# Patient Record
Sex: MALE | Race: WHITE | URBAN - METROPOLITAN AREA
[De-identification: names, ages, dates, MRNs, and addresses within clinical notes are randomized per-mention and may not be internally consistent; named-entity substitution may affect disease eponyms.]

---

## 2017-04-03 ENCOUNTER — DOCTOR'S OFFICE (OUTPATIENT)
Dept: URBAN - METROPOLITAN AREA CLINIC 125 | Facility: CLINIC | Age: 77
Setting detail: OPHTHALMOLOGY
End: 2017-04-03
Payer: COMMERCIAL

## 2017-04-03 DIAGNOSIS — H35.81: ICD-10-CM

## 2017-04-03 DIAGNOSIS — H40.10X2: ICD-10-CM

## 2017-04-03 PROCEDURE — 92012 INTRM OPH EXAM EST PATIENT: CPT | Performed by: OPHTHALMOLOGY

## 2017-04-03 PROCEDURE — 92083 EXTENDED VISUAL FIELD XM: CPT | Performed by: OPHTHALMOLOGY

## 2017-04-03 ASSESSMENT — REFRACTION_CURRENTRX
OD_OVR_VA: 20/
OS_OVR_VA: 20/
OD_OVR_VA: 20/
OS_OVR_VA: 20/
OD_OVR_VA: 20/
OS_OVR_VA: 20/

## 2017-04-03 ASSESSMENT — REFRACTION_MANIFEST
OD_VA2: 20/
OS_VA3: 20/
OD_VA3: 20/
OD_VA2: 20/
OS_VA3: 20/
OD_VA2: 20/
OD_VA1: 20/
OD_VA3: 20/
OS_VA3: 20/
OS_VA2: 20/
OS_VA2: 20/
OD_VA1: 20/
OU_VA: 20/
OS_VA1: 20/
OS_VA2: 20/
OD_VA1: 20/
OD_VA3: 20/
OS_VA1: 20/
OU_VA: 20/
OS_VA1: 20/
OU_VA: 20/

## 2017-04-03 ASSESSMENT — VISUAL ACUITY
OD_BCVA: 20/30-2
OS_BCVA: 20/40-2

## 2017-04-03 ASSESSMENT — REFRACTION_AUTOREFRACTION
OS_CYLINDER: -0.50
OD_CYLINDER: -0.25
OS_AXIS: 099
OD_AXIS: 106
OD_SPHERE: -0.75
OS_SPHERE: 0.00

## 2017-04-03 ASSESSMENT — CONFRONTATIONAL VISUAL FIELD TEST (CVF)
OS_FINDINGS: FULL
OD_FINDINGS: FULL

## 2017-04-03 ASSESSMENT — LID EXAM ASSESSMENTS
OD_BLEPHARITIS: T
OS_BLEPHARITIS: +1

## 2017-04-03 ASSESSMENT — SUPERFICIAL PUNCTATE KERATITIS (SPK)
OS_SPK: T
OD_SPK: T

## 2017-04-03 ASSESSMENT — SPHEQUIV_DERIVED
OD_SPHEQUIV: -0.875
OS_SPHEQUIV: -0.25

## 2017-05-09 ENCOUNTER — DOCTOR'S OFFICE (OUTPATIENT)
Dept: URBAN - NONMETROPOLITAN AREA CLINIC 1 | Facility: CLINIC | Age: 77
Setting detail: OPHTHALMOLOGY
End: 2017-05-09
Payer: COMMERCIAL

## 2017-05-09 DIAGNOSIS — H04.122: ICD-10-CM

## 2017-05-09 DIAGNOSIS — H35.371: ICD-10-CM

## 2017-05-09 DIAGNOSIS — H40.10X2: ICD-10-CM

## 2017-05-09 DIAGNOSIS — H35.81: ICD-10-CM

## 2017-05-09 DIAGNOSIS — H27.8: ICD-10-CM

## 2017-05-09 DIAGNOSIS — H01.004: ICD-10-CM

## 2017-05-09 DIAGNOSIS — H01.001: ICD-10-CM

## 2017-05-09 DIAGNOSIS — H01.002: ICD-10-CM

## 2017-05-09 DIAGNOSIS — H01.005: ICD-10-CM

## 2017-05-09 DIAGNOSIS — H04.121: ICD-10-CM

## 2017-05-09 DIAGNOSIS — H43.812: ICD-10-CM

## 2017-05-09 PROCEDURE — 83861 MICROFLUID ANALY TEARS: CPT | Performed by: OPHTHALMOLOGY

## 2017-05-09 PROCEDURE — 92133 CPTRZD OPH DX IMG PST SGM ON: CPT | Performed by: OPHTHALMOLOGY

## 2017-05-09 PROCEDURE — 92250 FUNDUS PHOTOGRAPHY W/I&R: CPT | Performed by: OPHTHALMOLOGY

## 2017-05-09 PROCEDURE — 92014 COMPRE OPH EXAM EST PT 1/>: CPT | Performed by: OPHTHALMOLOGY

## 2017-05-09 ASSESSMENT — SUPERFICIAL PUNCTATE KERATITIS (SPK)
OD_SPK: T
OS_SPK: T

## 2017-05-09 ASSESSMENT — REFRACTION_MANIFEST
OS_VA1: 20/
OS_VA3: 20/
OS_VA1: 20/
OD_VA1: 20/
OS_VA3: 20/
OD_VA1: 20/
OD_VA2: 20/
OU_VA: 20/
OS_VA2: 20/
OU_VA: 20/
OS_VA3: 20/
OD_VA3: 20/
OD_VA3: 20/
OD_VA1: 20/
OS_VA2: 20/
OU_VA: 20/
OS_VA2: 20/
OS_VA1: 20/
OD_VA2: 20/
OD_VA3: 20/
OD_VA2: 20/

## 2017-05-09 ASSESSMENT — REFRACTION_CURRENTRX
OS_OVR_VA: 20/
OD_OVR_VA: 20/
OS_OVR_VA: 20/
OD_OVR_VA: 20/
OS_OVR_VA: 20/
OD_OVR_VA: 20/

## 2017-05-09 ASSESSMENT — LID EXAM ASSESSMENTS
OS_BLEPHARITIS: +1
OD_BLEPHARITIS: T

## 2017-05-09 ASSESSMENT — AXIALLENGTH_DERIVED
OD_AL: 23.0257
OS_AL: 22.9333

## 2017-05-09 ASSESSMENT — REFRACTION_AUTOREFRACTION
OS_AXIS: 85
OD_SPHERE: -0.50
OS_SPHERE: -0.25
OD_AXIS: 100
OS_CYLINDER: -0.50
OD_CYLINDER: -0.50

## 2017-05-09 ASSESSMENT — KERATOMETRY
OD_K2POWER_DIOPTERS: 46.00
OD_K1POWER_DIOPTERS: 45.75
OS_K1POWER_DIOPTERS: 45.75
OS_AXISANGLE_DEGREES: 120
OS_K2POWER_DIOPTERS: 46.00
OD_AXISANGLE_DEGREES: 011

## 2017-05-09 ASSESSMENT — VISUAL ACUITY
OD_BCVA: 20/30-2
OS_BCVA: 20/40+2

## 2017-05-09 ASSESSMENT — CONFRONTATIONAL VISUAL FIELD TEST (CVF)
OD_FINDINGS: FULL
OS_FINDINGS: FULL

## 2017-05-09 ASSESSMENT — SPHEQUIV_DERIVED
OD_SPHEQUIV: -0.75
OS_SPHEQUIV: -0.5

## 2017-07-17 ENCOUNTER — DOCTOR'S OFFICE (OUTPATIENT)
Dept: URBAN - METROPOLITAN AREA CLINIC 125 | Facility: CLINIC | Age: 77
Setting detail: OPHTHALMOLOGY
End: 2017-07-17
Payer: COMMERCIAL

## 2017-07-17 DIAGNOSIS — H04.122: ICD-10-CM

## 2017-07-17 DIAGNOSIS — H40.1111: ICD-10-CM

## 2017-07-17 DIAGNOSIS — H04.121: ICD-10-CM

## 2017-07-17 DIAGNOSIS — H35.371: ICD-10-CM

## 2017-07-17 DIAGNOSIS — H43.812: ICD-10-CM

## 2017-07-17 DIAGNOSIS — H35.81: ICD-10-CM

## 2017-07-17 PROCEDURE — 92134 CPTRZ OPH DX IMG PST SGM RTA: CPT | Performed by: OPHTHALMOLOGY

## 2017-07-17 PROCEDURE — 92014 COMPRE OPH EXAM EST PT 1/>: CPT | Performed by: OPHTHALMOLOGY

## 2017-07-17 ASSESSMENT — REFRACTION_MANIFEST
OD_VA3: 20/
OU_VA: 20/
OD_VA1: 20/
OS_VA3: 20/
OS_VA1: 20/
OD_VA1: 20/
OD_VA1: 20/
OS_VA2: 20/
OD_VA2: 20/
OD_VA2: 20/
OS_VA3: 20/
OS_VA2: 20/
OS_VA2: 20/
OU_VA: 20/
OD_VA3: 20/
OS_VA1: 20/
OS_VA3: 20/
OU_VA: 20/
OD_VA2: 20/
OS_VA1: 20/
OD_VA3: 20/

## 2017-07-17 ASSESSMENT — SUPERFICIAL PUNCTATE KERATITIS (SPK)
OD_SPK: T
OS_SPK: T

## 2017-07-17 ASSESSMENT — KERATOMETRY
OD_K1POWER_DIOPTERS: 45.75
OS_K2POWER_DIOPTERS: 46.00
OD_K2POWER_DIOPTERS: 46.00
OD_AXISANGLE_DEGREES: 011
OS_AXISANGLE_DEGREES: 120
OS_K1POWER_DIOPTERS: 45.75

## 2017-07-17 ASSESSMENT — CONFRONTATIONAL VISUAL FIELD TEST (CVF)
OD_FINDINGS: FULL
OS_FINDINGS: FULL

## 2017-07-17 ASSESSMENT — LID EXAM ASSESSMENTS
OS_BLEPHARITIS: +1
OD_BLEPHARITIS: T

## 2017-07-17 ASSESSMENT — REFRACTION_CURRENTRX
OD_OVR_VA: 20/
OD_OVR_VA: 20/
OS_OVR_VA: 20/
OS_OVR_VA: 20/
OD_OVR_VA: 20/
OS_OVR_VA: 20/

## 2017-07-17 ASSESSMENT — AXIALLENGTH_DERIVED
OS_AL: 22.9333
OD_AL: 23.0257

## 2017-07-17 ASSESSMENT — REFRACTION_AUTOREFRACTION
OD_CYLINDER: -0.50
OS_CYLINDER: -0.50
OS_AXIS: 85
OD_AXIS: 100
OD_SPHERE: -0.50
OS_SPHERE: -0.25

## 2017-07-17 ASSESSMENT — SPHEQUIV_DERIVED
OD_SPHEQUIV: -0.75
OS_SPHEQUIV: -0.5

## 2017-07-17 ASSESSMENT — VISUAL ACUITY
OS_BCVA: 20/25-2
OD_BCVA: 20/25+2

## 2017-11-15 ENCOUNTER — DOCTOR'S OFFICE (OUTPATIENT)
Dept: URBAN - NONMETROPOLITAN AREA CLINIC 1 | Facility: CLINIC | Age: 77
Setting detail: OPHTHALMOLOGY
End: 2017-11-15
Payer: COMMERCIAL

## 2017-11-15 DIAGNOSIS — H43.812: ICD-10-CM

## 2017-11-15 DIAGNOSIS — H35.371: ICD-10-CM

## 2017-11-15 DIAGNOSIS — H04.123: ICD-10-CM

## 2017-11-15 DIAGNOSIS — H35.81: ICD-10-CM

## 2017-11-15 DIAGNOSIS — H40.1111: ICD-10-CM

## 2017-11-15 PROCEDURE — 92134 CPTRZ OPH DX IMG PST SGM RTA: CPT | Performed by: OPHTHALMOLOGY

## 2017-11-15 PROCEDURE — 76514 ECHO EXAM OF EYE THICKNESS: CPT | Performed by: OPHTHALMOLOGY

## 2017-11-15 PROCEDURE — 92083 EXTENDED VISUAL FIELD XM: CPT | Performed by: OPHTHALMOLOGY

## 2017-11-15 PROCEDURE — 92014 COMPRE OPH EXAM EST PT 1/>: CPT | Performed by: OPHTHALMOLOGY

## 2017-11-15 ASSESSMENT — REFRACTION_MANIFEST
OD_VA1: 20/
OD_VA3: 20/
OS_VA1: 20/
OD_VA2: 20/
OU_VA: 20/
OD_VA2: 20/
OS_VA2: 20/
OD_VA1: 20/
OS_VA3: 20/
OD_VA3: 20/
OU_VA: 20/
OS_VA1: 20/
OU_VA: 20/
OS_VA2: 20/
OD_VA2: 20/
OD_VA1: 20/
OS_VA3: 20/
OD_VA3: 20/
OS_VA3: 20/
OS_VA1: 20/
OS_VA2: 20/

## 2017-11-15 ASSESSMENT — KERATOMETRY
OS_K1POWER_DIOPTERS: 45.75
OD_AXISANGLE_DEGREES: 011
OD_K1POWER_DIOPTERS: 45.75
OS_AXISANGLE_DEGREES: 120
OS_K2POWER_DIOPTERS: 46.00
OD_K2POWER_DIOPTERS: 46.00

## 2017-11-15 ASSESSMENT — AXIALLENGTH_DERIVED
OS_AL: 22.8873
OD_AL: 22.8873

## 2017-11-15 ASSESSMENT — REFRACTION_AUTOREFRACTION
OS_SPHERE: 0.00
OD_CYLINDER: -0.25
OS_AXIS: 095
OD_AXIS: 098
OS_CYLINDER: -0.75
OD_SPHERE: -0.25

## 2017-11-15 ASSESSMENT — PACHYMETRY
OD_CT_UM: 553
OD_CT_CORRECTION: -1
OS_CT_UM: 546
OS_CT_CORRECTION: 0

## 2017-11-15 ASSESSMENT — VISUAL ACUITY
OS_BCVA: 20/25-2
OD_BCVA: 20/20

## 2017-11-15 ASSESSMENT — SPHEQUIV_DERIVED
OD_SPHEQUIV: -0.375
OS_SPHEQUIV: -0.375

## 2017-11-15 ASSESSMENT — REFRACTION_CURRENTRX
OS_OVR_VA: 20/
OD_OVR_VA: 20/
OS_OVR_VA: 20/
OS_OVR_VA: 20/

## 2017-11-15 ASSESSMENT — CONFRONTATIONAL VISUAL FIELD TEST (CVF)
OS_FINDINGS: FULL
OD_FINDINGS: FULL

## 2017-11-15 ASSESSMENT — SUPERFICIAL PUNCTATE KERATITIS (SPK)
OS_SPK: T
OD_SPK: T

## 2018-04-02 ENCOUNTER — DOCTOR'S OFFICE (OUTPATIENT)
Dept: URBAN - METROPOLITAN AREA CLINIC 136 | Facility: CLINIC | Age: 78
Setting detail: OPHTHALMOLOGY
End: 2018-04-02
Payer: COMMERCIAL

## 2018-04-02 DIAGNOSIS — H43.812: ICD-10-CM

## 2018-04-02 DIAGNOSIS — H40.1111: ICD-10-CM

## 2018-04-02 DIAGNOSIS — H35.371: ICD-10-CM

## 2018-04-02 DIAGNOSIS — H35.81: ICD-10-CM

## 2018-04-02 DIAGNOSIS — H04.123: ICD-10-CM

## 2018-04-02 DIAGNOSIS — H27.8: ICD-10-CM

## 2018-04-02 PROCEDURE — 92014 COMPRE OPH EXAM EST PT 1/>: CPT | Performed by: OPHTHALMOLOGY

## 2018-04-02 PROCEDURE — 92134 CPTRZ OPH DX IMG PST SGM RTA: CPT | Performed by: OPHTHALMOLOGY

## 2018-04-02 ASSESSMENT — REFRACTION_MANIFEST
OU_VA: 20/
OD_VA3: 20/
OS_VA1: 20/
OD_VA1: 20/
OU_VA: 20/
OD_VA2: 20/
OU_VA: 20/
OD_VA3: 20/
OD_VA1: 20/
OS_VA2: 20/
OS_VA3: 20/
OD_VA1: 20/
OS_VA1: 20/
OS_VA3: 20/
OD_VA2: 20/
OS_VA1: 20/
OS_VA2: 20/
OS_VA3: 20/
OD_VA3: 20/
OD_VA2: 20/
OS_VA2: 20/

## 2018-04-02 ASSESSMENT — REFRACTION_CURRENTRX
OS_OVR_VA: 20/
OD_OVR_VA: 20/
OS_OVR_VA: 20/
OD_OVR_VA: 20/
OS_OVR_VA: 20/
OD_OVR_VA: 20/

## 2018-04-02 ASSESSMENT — SPHEQUIV_DERIVED
OD_SPHEQUIV: -0.375
OS_SPHEQUIV: -0.375

## 2018-04-02 ASSESSMENT — SUPERFICIAL PUNCTATE KERATITIS (SPK)
OS_SPK: T
OD_SPK: T

## 2018-04-02 ASSESSMENT — REFRACTION_AUTOREFRACTION
OD_CYLINDER: -0.25
OS_SPHERE: 0.00
OS_CYLINDER: -0.75
OS_AXIS: 095
OD_SPHERE: -0.25
OD_AXIS: 098

## 2018-04-02 ASSESSMENT — CONFRONTATIONAL VISUAL FIELD TEST (CVF)
OS_FINDINGS: FULL
OD_FINDINGS: FULL

## 2018-04-02 ASSESSMENT — VISUAL ACUITY
OD_BCVA: 20/25+2
OS_BCVA: 20/20-2

## 2018-05-15 ENCOUNTER — DOCTOR'S OFFICE (OUTPATIENT)
Dept: URBAN - NONMETROPOLITAN AREA CLINIC 1 | Facility: CLINIC | Age: 78
Setting detail: OPHTHALMOLOGY
End: 2018-05-15
Payer: COMMERCIAL

## 2018-05-15 DIAGNOSIS — H35.81: ICD-10-CM

## 2018-05-15 DIAGNOSIS — H43.812: ICD-10-CM

## 2018-05-15 DIAGNOSIS — H40.1111: ICD-10-CM

## 2018-05-15 DIAGNOSIS — H27.8: ICD-10-CM

## 2018-05-15 DIAGNOSIS — H35.371: ICD-10-CM

## 2018-05-15 DIAGNOSIS — H04.123: ICD-10-CM

## 2018-05-15 PROCEDURE — 92014 COMPRE OPH EXAM EST PT 1/>: CPT | Performed by: OPHTHALMOLOGY

## 2018-05-15 PROCEDURE — 92133 CPTRZD OPH DX IMG PST SGM ON: CPT | Performed by: OPHTHALMOLOGY

## 2018-05-15 PROCEDURE — 92250 FUNDUS PHOTOGRAPHY W/I&R: CPT | Performed by: OPHTHALMOLOGY

## 2018-05-15 ASSESSMENT — SUPERFICIAL PUNCTATE KERATITIS (SPK)
OD_SPK: T
OS_SPK: T

## 2018-05-15 ASSESSMENT — REFRACTION_MANIFEST
OD_VA1: 20/
OD_VA1: 20/
OD_VA3: 20/
OD_VA2: 20/
OD_VA2: 20/
OS_VA1: 20/
OD_VA3: 20/
OS_VA2: 20/
OS_VA3: 20/
OS_VA3: 20/
OS_VA1: 20/
OU_VA: 20/
OS_VA1: 20/
OD_VA2: 20/
OD_VA3: 20/
OS_VA3: 20/
OS_VA2: 20/
OU_VA: 20/
OU_VA: 20/
OS_VA2: 20/
OD_VA1: 20/

## 2018-05-15 ASSESSMENT — REFRACTION_CURRENTRX
OD_OVR_VA: 20/
OS_OVR_VA: 20/

## 2018-05-15 ASSESSMENT — REFRACTION_AUTOREFRACTION
OD_AXIS: 098
OD_CYLINDER: -0.25
OS_SPHERE: 0.00
OS_AXIS: 095
OD_SPHERE: -0.25
OS_CYLINDER: -0.75

## 2018-05-15 ASSESSMENT — SPHEQUIV_DERIVED
OS_SPHEQUIV: -0.375
OD_SPHEQUIV: -0.375

## 2018-05-15 ASSESSMENT — CONFRONTATIONAL VISUAL FIELD TEST (CVF)
OS_FINDINGS: FULL
OD_FINDINGS: FULL

## 2018-05-15 ASSESSMENT — VISUAL ACUITY
OD_BCVA: 20/25
OS_BCVA: 20/25

## 2018-06-18 ENCOUNTER — DOCTOR'S OFFICE (OUTPATIENT)
Dept: URBAN - METROPOLITAN AREA CLINIC 136 | Facility: CLINIC | Age: 78
Setting detail: OPHTHALMOLOGY
End: 2018-06-18
Payer: COMMERCIAL

## 2018-06-18 DIAGNOSIS — H35.371: ICD-10-CM

## 2018-06-18 DIAGNOSIS — H40.1111: ICD-10-CM

## 2018-06-18 DIAGNOSIS — H43.812: ICD-10-CM

## 2018-06-18 DIAGNOSIS — H35.81: ICD-10-CM

## 2018-06-18 DIAGNOSIS — H04.123: ICD-10-CM

## 2018-06-18 DIAGNOSIS — H27.8: ICD-10-CM

## 2018-06-18 PROCEDURE — 92012 INTRM OPH EXAM EST PATIENT: CPT | Performed by: OPHTHALMOLOGY

## 2018-06-18 PROCEDURE — 92134 CPTRZ OPH DX IMG PST SGM RTA: CPT | Performed by: OPHTHALMOLOGY

## 2018-06-18 ASSESSMENT — REFRACTION_MANIFEST
OD_VA2: 20/
OS_VA2: 20/
OD_VA3: 20/
OD_VA1: 20/
OU_VA: 20/
OU_VA: 20/
OS_VA1: 20/
OS_VA1: 20/
OS_VA3: 20/
OS_VA1: 20/
OS_VA3: 20/
OD_VA3: 20/
OD_VA2: 20/
OS_VA2: 20/
OD_VA2: 20/
OS_VA3: 20/
OU_VA: 20/
OS_VA2: 20/
OD_VA3: 20/

## 2018-06-18 ASSESSMENT — REFRACTION_AUTOREFRACTION
OD_SPHERE: -0.25
OD_AXIS: 098
OD_CYLINDER: -0.25
OS_AXIS: 095
OS_SPHERE: 0.00
OS_CYLINDER: -0.75

## 2018-06-18 ASSESSMENT — SPHEQUIV_DERIVED
OD_SPHEQUIV: -0.375
OS_SPHEQUIV: -0.375

## 2018-06-18 ASSESSMENT — REFRACTION_CURRENTRX
OD_OVR_VA: 20/
OS_OVR_VA: 20/
OD_OVR_VA: 20/
OS_OVR_VA: 20/
OD_OVR_VA: 20/
OS_OVR_VA: 20/

## 2018-06-18 ASSESSMENT — CONFRONTATIONAL VISUAL FIELD TEST (CVF)
OS_FINDINGS: FULL
OD_FINDINGS: FULL

## 2018-06-18 ASSESSMENT — SUPERFICIAL PUNCTATE KERATITIS (SPK)
OS_SPK: T
OD_SPK: T

## 2018-06-18 ASSESSMENT — VISUAL ACUITY
OD_BCVA: 20/20-2
OS_BCVA: 20/25-2

## 2018-09-17 ENCOUNTER — DOCTOR'S OFFICE (OUTPATIENT)
Dept: URBAN - METROPOLITAN AREA CLINIC 136 | Facility: CLINIC | Age: 78
Setting detail: OPHTHALMOLOGY
End: 2018-09-17
Payer: COMMERCIAL

## 2018-09-17 DIAGNOSIS — H27.8: ICD-10-CM

## 2018-09-17 DIAGNOSIS — H35.81: ICD-10-CM

## 2018-09-17 DIAGNOSIS — H35.371: ICD-10-CM

## 2018-09-17 DIAGNOSIS — H04.122: ICD-10-CM

## 2018-09-17 DIAGNOSIS — H43.812: ICD-10-CM

## 2018-09-17 DIAGNOSIS — H40.1111: ICD-10-CM

## 2018-09-17 DIAGNOSIS — H04.121: ICD-10-CM

## 2018-09-17 PROCEDURE — 92134 CPTRZ OPH DX IMG PST SGM RTA: CPT | Performed by: OPHTHALMOLOGY

## 2018-09-17 PROCEDURE — 92014 COMPRE OPH EXAM EST PT 1/>: CPT | Performed by: OPHTHALMOLOGY

## 2018-09-17 ASSESSMENT — SPHEQUIV_DERIVED
OS_SPHEQUIV: -0.375
OD_SPHEQUIV: -0.375

## 2018-09-17 ASSESSMENT — REFRACTION_CURRENTRX
OS_OVR_VA: 20/
OS_OVR_VA: 20/
OD_OVR_VA: 20/
OD_OVR_VA: 20/
OS_OVR_VA: 20/
OD_OVR_VA: 20/

## 2018-09-17 ASSESSMENT — REFRACTION_MANIFEST
OD_VA3: 20/
OU_VA: 20/
OS_VA3: 20/
OS_VA1: 20/
OS_VA1: 20/
OS_VA2: 20/
OS_VA3: 20/
OD_VA1: 20/
OU_VA: 20/
OD_VA2: 20/
OS_VA2: 20/
OD_VA1: 20/
OD_VA3: 20/
OD_VA2: 20/

## 2018-09-17 ASSESSMENT — REFRACTION_AUTOREFRACTION
OS_CYLINDER: -0.75
OD_AXIS: 098
OS_AXIS: 095
OD_SPHERE: -0.25
OS_SPHERE: 0.00
OD_CYLINDER: -0.25

## 2018-09-17 ASSESSMENT — VISUAL ACUITY
OD_BCVA: 20/25+2
OS_BCVA: 20/25-2

## 2018-09-17 ASSESSMENT — CONFRONTATIONAL VISUAL FIELD TEST (CVF)
OD_FINDINGS: FULL
OS_FINDINGS: FULL

## 2018-09-17 ASSESSMENT — SUPERFICIAL PUNCTATE KERATITIS (SPK)
OS_SPK: T
OD_SPK: T

## 2018-11-12 ENCOUNTER — DOCTOR'S OFFICE (OUTPATIENT)
Dept: URBAN - METROPOLITAN AREA CLINIC 125 | Facility: CLINIC | Age: 78
Setting detail: OPHTHALMOLOGY
End: 2018-11-12
Payer: COMMERCIAL

## 2018-11-12 DIAGNOSIS — H04.123: ICD-10-CM

## 2018-11-12 DIAGNOSIS — H40.1111: ICD-10-CM

## 2018-11-12 DIAGNOSIS — H04.122: ICD-10-CM

## 2018-11-12 DIAGNOSIS — H04.121: ICD-10-CM

## 2018-11-12 DIAGNOSIS — H35.81: ICD-10-CM

## 2018-11-12 PROCEDURE — 83861 MICROFLUID ANALY TEARS: CPT | Performed by: OPHTHALMOLOGY

## 2018-11-12 PROCEDURE — 92083 EXTENDED VISUAL FIELD XM: CPT | Performed by: OPHTHALMOLOGY

## 2018-11-12 PROCEDURE — 92014 COMPRE OPH EXAM EST PT 1/>: CPT | Performed by: OPHTHALMOLOGY

## 2018-11-12 PROCEDURE — 92250 FUNDUS PHOTOGRAPHY W/I&R: CPT | Performed by: OPHTHALMOLOGY

## 2018-11-12 PROCEDURE — 76514 ECHO EXAM OF EYE THICKNESS: CPT | Performed by: OPHTHALMOLOGY

## 2018-11-12 ASSESSMENT — SPHEQUIV_DERIVED
OS_SPHEQUIV: -0.375
OD_SPHEQUIV: -0.375

## 2018-11-12 ASSESSMENT — REFRACTION_MANIFEST
OD_VA3: 20/
OD_VA2: 20/
OS_VA1: 20/
OS_VA2: 20/
OD_VA2: 20/
OU_VA: 20/
OU_VA: 20/
OD_VA1: 20/
OS_VA3: 20/
OD_VA1: 20/
OS_VA3: 20/
OD_VA3: 20/
OS_VA2: 20/
OS_VA1: 20/

## 2018-11-12 ASSESSMENT — CONFRONTATIONAL VISUAL FIELD TEST (CVF)
OD_FINDINGS: FULL
OS_FINDINGS: FULL

## 2018-11-12 ASSESSMENT — VISUAL ACUITY
OS_BCVA: 20/25-2
OD_BCVA: 20/25-2

## 2018-11-12 ASSESSMENT — REFRACTION_AUTOREFRACTION
OS_AXIS: 095
OS_CYLINDER: -0.75
OD_SPHERE: -0.25
OD_CYLINDER: -0.25
OD_AXIS: 098
OS_SPHERE: 0.00

## 2018-11-12 ASSESSMENT — SUPERFICIAL PUNCTATE KERATITIS (SPK)
OS_SPK: T
OD_SPK: T

## 2018-11-12 ASSESSMENT — REFRACTION_CURRENTRX
OS_OVR_VA: 20/
OD_OVR_VA: 20/
OS_OVR_VA: 20/
OS_OVR_VA: 20/
OD_OVR_VA: 20/
OD_OVR_VA: 20/

## 2018-11-12 ASSESSMENT — PACHYMETRY
OS_CT_UM: 546
OD_CT_UM: 553
OD_CT_CORRECTION: -1
OS_CT_CORRECTION: 0

## 2018-12-06 ENCOUNTER — DOCTOR'S OFFICE (OUTPATIENT)
Dept: URBAN - METROPOLITAN AREA CLINIC 136 | Facility: CLINIC | Age: 78
Setting detail: OPHTHALMOLOGY
End: 2018-12-06
Payer: COMMERCIAL

## 2018-12-06 DIAGNOSIS — H35.371: ICD-10-CM

## 2018-12-06 DIAGNOSIS — H27.8: ICD-10-CM

## 2018-12-06 DIAGNOSIS — H40.1111: ICD-10-CM

## 2018-12-06 DIAGNOSIS — H43.812: ICD-10-CM

## 2018-12-06 DIAGNOSIS — H35.81: ICD-10-CM

## 2018-12-06 PROCEDURE — 92134 CPTRZ OPH DX IMG PST SGM RTA: CPT | Performed by: OPHTHALMOLOGY

## 2018-12-06 PROCEDURE — 92014 COMPRE OPH EXAM EST PT 1/>: CPT | Performed by: OPHTHALMOLOGY

## 2018-12-06 ASSESSMENT — SUPERFICIAL PUNCTATE KERATITIS (SPK)
OD_SPK: T
OS_SPK: T

## 2018-12-06 ASSESSMENT — CONFRONTATIONAL VISUAL FIELD TEST (CVF)
OD_FINDINGS: FULL
OS_FINDINGS: FULL

## 2018-12-06 ASSESSMENT — SPHEQUIV_DERIVED
OS_SPHEQUIV: -0.375
OD_SPHEQUIV: -0.375

## 2018-12-06 ASSESSMENT — REFRACTION_MANIFEST
OD_VA2: 20/
OD_VA2: 20/
OS_VA1: 20/
OU_VA: 20/
OS_VA2: 20/
OD_VA1: 20/
OU_VA: 20/
OD_VA1: 20/
OS_VA3: 20/
OS_VA2: 20/
OD_VA3: 20/
OS_VA3: 20/
OS_VA1: 20/
OD_VA3: 20/

## 2018-12-06 ASSESSMENT — REFRACTION_AUTOREFRACTION
OD_AXIS: 098
OS_CYLINDER: -0.75
OS_AXIS: 095
OD_CYLINDER: -0.25
OD_SPHERE: -0.25
OS_SPHERE: 0.00

## 2018-12-06 ASSESSMENT — VISUAL ACUITY
OS_BCVA: 20/25+2
OD_BCVA: 20/20-2

## 2018-12-06 ASSESSMENT — REFRACTION_CURRENTRX
OD_OVR_VA: 20/
OS_OVR_VA: 20/

## 2019-03-04 ENCOUNTER — DOCTOR'S OFFICE (OUTPATIENT)
Dept: URBAN - METROPOLITAN AREA CLINIC 136 | Facility: CLINIC | Age: 79
Setting detail: OPHTHALMOLOGY
End: 2019-03-04
Payer: COMMERCIAL

## 2019-03-04 DIAGNOSIS — H40.1111: ICD-10-CM

## 2019-03-04 DIAGNOSIS — H35.371: ICD-10-CM

## 2019-03-04 DIAGNOSIS — H43.812: ICD-10-CM

## 2019-03-04 DIAGNOSIS — H35.81: ICD-10-CM

## 2019-03-04 DIAGNOSIS — H27.8: ICD-10-CM

## 2019-03-04 PROCEDURE — 92014 COMPRE OPH EXAM EST PT 1/>: CPT | Performed by: OPHTHALMOLOGY

## 2019-03-04 PROCEDURE — 92134 CPTRZ OPH DX IMG PST SGM RTA: CPT | Performed by: OPHTHALMOLOGY

## 2019-03-04 ASSESSMENT — REFRACTION_CURRENTRX
OS_OVR_VA: 20/
OD_OVR_VA: 20/
OS_OVR_VA: 20/
OS_OVR_VA: 20/
OD_OVR_VA: 20/
OD_OVR_VA: 20/

## 2019-03-04 ASSESSMENT — SUPERFICIAL PUNCTATE KERATITIS (SPK)
OD_SPK: T
OS_SPK: T

## 2019-03-04 ASSESSMENT — REFRACTION_MANIFEST
OD_VA3: 20/
OU_VA: 20/
OD_VA3: 20/
OS_VA1: 20/
OU_VA: 20/
OS_VA1: 20/
OS_VA3: 20/
OS_VA2: 20/
OD_VA2: 20/
OS_VA3: 20/
OD_VA1: 20/
OD_VA2: 20/
OS_VA2: 20/
OD_VA1: 20/

## 2019-03-04 ASSESSMENT — SPHEQUIV_DERIVED
OD_SPHEQUIV: -0.375
OS_SPHEQUIV: -0.375

## 2019-03-04 ASSESSMENT — REFRACTION_AUTOREFRACTION
OS_SPHERE: 0.00
OD_CYLINDER: -0.25
OS_CYLINDER: -0.75
OD_SPHERE: -0.25
OS_AXIS: 095
OD_AXIS: 098

## 2019-03-04 ASSESSMENT — CONFRONTATIONAL VISUAL FIELD TEST (CVF)
OS_FINDINGS: FULL
OD_FINDINGS: FULL

## 2019-03-04 ASSESSMENT — VISUAL ACUITY
OS_BCVA: 20/30+2
OD_BCVA: 20/25-2

## 2019-06-07 ENCOUNTER — DOCTOR'S OFFICE (OUTPATIENT)
Dept: URBAN - METROPOLITAN AREA CLINIC 136 | Facility: CLINIC | Age: 79
Setting detail: OPHTHALMOLOGY
End: 2019-06-07

## 2019-06-07 DIAGNOSIS — H52.13: ICD-10-CM

## 2019-06-07 DIAGNOSIS — H52.223: ICD-10-CM

## 2019-06-07 DIAGNOSIS — H52.4: ICD-10-CM

## 2019-06-07 PROCEDURE — 92015 DETERMINE REFRACTIVE STATE: CPT | Performed by: OPTOMETRIST

## 2019-06-07 ASSESSMENT — REFRACTION_MANIFEST
OS_VA3: 20/
OD_VA2: 20/
OD_VA3: 20/
OS_VA1: 20/20
OS_AXIS: 080
OS_VA2: 20/20
OS_ADD: +2.25
OU_VA: 20/20
OD_VA1: 20/
OD_VA3: 20/
OD_SPHERE: -0.50
OS_VA2: 20/
OD_VA1: 20/20-2
OU_VA: 20/
OD_CYLINDER: SPH
OS_VA3: 20/
OS_VA1: 20/
OS_CYLINDER: -0.75
OS_SPHERE: PLANO
OD_ADD: +2.25
OD_VA2: 20/20

## 2019-06-07 ASSESSMENT — REFRACTION_AUTOREFRACTION
OD_CYLINDER: -0.25
OD_AXIS: 115
OS_SPHERE: +0.25
OS_CYLINDER: -0.75
OS_AXIS: 101
OD_SPHERE: PLANO

## 2019-06-07 ASSESSMENT — REFRACTION_CURRENTRX
OS_OVR_VA: 20/
OS_OVR_VA: 20/
OD_OVR_VA: 20/
OS_OVR_VA: 20/
OD_OVR_VA: 20/
OD_OVR_VA: 20/

## 2019-06-07 ASSESSMENT — KERATOMETRY
OS_AXISANGLE_DEGREES: 120
OS_K2POWER_DIOPTERS: 46.00
OD_K2POWER_DIOPTERS: 46.00
OS_K1POWER_DIOPTERS: 45.75
OD_AXISANGLE_DEGREES: 011
OD_K1POWER_DIOPTERS: 45.75

## 2019-06-07 ASSESSMENT — SPHEQUIV_DERIVED: OS_SPHEQUIV: -0.125

## 2019-06-07 ASSESSMENT — VISUAL ACUITY
OD_BCVA: 20/25+2
OS_BCVA: 20/30+2

## 2019-06-07 ASSESSMENT — AXIALLENGTH_DERIVED: OS_AL: 22.796

## 2019-06-17 ENCOUNTER — DOCTOR'S OFFICE (OUTPATIENT)
Dept: URBAN - METROPOLITAN AREA CLINIC 136 | Facility: CLINIC | Age: 79
Setting detail: OPHTHALMOLOGY
End: 2019-06-17
Payer: COMMERCIAL

## 2019-06-17 DIAGNOSIS — H27.8: ICD-10-CM

## 2019-06-17 DIAGNOSIS — H40.1111: ICD-10-CM

## 2019-06-17 DIAGNOSIS — H47.011: ICD-10-CM

## 2019-06-17 PROCEDURE — 92133 CPTRZD OPH DX IMG PST SGM ON: CPT | Performed by: OPHTHALMOLOGY

## 2019-06-17 PROCEDURE — 92014 COMPRE OPH EXAM EST PT 1/>: CPT | Performed by: OPHTHALMOLOGY

## 2019-06-17 ASSESSMENT — CONFRONTATIONAL VISUAL FIELD TEST (CVF)
OS_FINDINGS: FULL
OD_FINDINGS: FULL

## 2019-06-17 ASSESSMENT — REFRACTION_MANIFEST
OU_VA: 20/
OD_ADD: +2.25
OD_VA1: 20/20-2
OS_VA1: 20/20
OS_ADD: +2.25
OS_VA2: 20/20
OD_CYLINDER: SPH
OD_VA2: 20/20
OD_VA3: 20/
OD_VA3: 20/
OD_VA1: 20/
OU_VA: 20/20
OD_SPHERE: -0.50
OS_CYLINDER: -0.75
OS_VA1: 20/
OS_VA3: 20/
OS_SPHERE: PLANO
OD_VA2: 20/
OS_AXIS: 080
OS_VA2: 20/
OS_VA3: 20/

## 2019-06-17 ASSESSMENT — REFRACTION_CURRENTRX
OS_OVR_VA: 20/
OD_OVR_VA: 20/
OS_OVR_VA: 20/
OD_OVR_VA: 20/
OD_OVR_VA: 20/
OS_OVR_VA: 20/

## 2019-06-17 ASSESSMENT — REFRACTION_AUTOREFRACTION
OS_CYLINDER: -0.75
OD_CYLINDER: -0.25
OS_AXIS: 101
OD_SPHERE: PLANO
OS_SPHERE: +0.25
OD_AXIS: 115

## 2019-06-17 ASSESSMENT — VISUAL ACUITY
OD_BCVA: 20/25+2
OS_BCVA: 20/25-2

## 2019-06-17 ASSESSMENT — SUPERFICIAL PUNCTATE KERATITIS (SPK)
OS_SPK: T
OD_SPK: T

## 2019-06-17 ASSESSMENT — SPHEQUIV_DERIVED: OS_SPHEQUIV: -0.125

## 2019-07-01 ENCOUNTER — RX ONLY (RX ONLY)
Age: 79
End: 2019-07-01

## 2019-07-01 ENCOUNTER — OPTICAL OFFICE (OUTPATIENT)
Dept: URBAN - METROPOLITAN AREA CLINIC 143 | Facility: CLINIC | Age: 79
Setting detail: OPHTHALMOLOGY
End: 2019-07-01

## 2019-07-01 ENCOUNTER — DOCTOR'S OFFICE (OUTPATIENT)
Dept: URBAN - METROPOLITAN AREA CLINIC 136 | Facility: CLINIC | Age: 79
Setting detail: OPHTHALMOLOGY
End: 2019-07-01
Payer: COMMERCIAL

## 2019-07-01 DIAGNOSIS — H52.11: ICD-10-CM

## 2019-07-01 DIAGNOSIS — H47.011: ICD-10-CM

## 2019-07-01 DIAGNOSIS — H40.1111: ICD-10-CM

## 2019-07-01 DIAGNOSIS — H27.8: ICD-10-CM

## 2019-07-01 DIAGNOSIS — H52.222: ICD-10-CM

## 2019-07-01 PROCEDURE — V2750 ANTI-REFLECTIVE COATING: HCPCS | Performed by: OPTOMETRIST

## 2019-07-01 PROCEDURE — 99212 OFFICE O/P EST SF 10 MIN: CPT | Performed by: OPHTHALMOLOGY

## 2019-07-01 PROCEDURE — V2020 VISION SVCS FRAMES PURCHASES: HCPCS | Performed by: OPTOMETRIST

## 2019-07-01 PROCEDURE — V2781 PROGRESSIVE LENS PER LENS: HCPCS | Performed by: OPTOMETRIST

## 2019-07-01 PROCEDURE — V2203 LENS SPHCYL BIFOCAL 4.00D/.1: HCPCS | Performed by: OPTOMETRIST

## 2019-07-01 PROCEDURE — V2784 LENS POLYCARB OR EQUAL: HCPCS | Performed by: OPTOMETRIST

## 2019-07-01 PROCEDURE — V2200 LENS SPHER BIFOC PLANO 4.00D: HCPCS | Performed by: OPTOMETRIST

## 2019-07-01 ASSESSMENT — SPHEQUIV_DERIVED: OS_SPHEQUIV: -0.125

## 2019-07-01 ASSESSMENT — REFRACTION_MANIFEST
OS_VA1: 20/
OS_VA3: 20/
OD_VA1: 20/
OD_VA2: 20/
OS_ADD: +2.25
OU_VA: 20/20
OS_VA2: 20/20
OD_CYLINDER: SPH
OS_AXIS: 080
OD_VA2: 20/20
OS_CYLINDER: -0.75
OS_VA2: 20/
OD_SPHERE: -0.50
OS_VA3: 20/
OD_ADD: +2.25
OD_VA3: 20/
OD_VA1: 20/20-2
OS_VA1: 20/20
OU_VA: 20/
OD_VA3: 20/
OS_SPHERE: PLANO

## 2019-07-01 ASSESSMENT — REFRACTION_CURRENTRX
OD_OVR_VA: 20/
OS_OVR_VA: 20/
OD_OVR_VA: 20/
OD_OVR_VA: 20/
OS_OVR_VA: 20/
OS_OVR_VA: 20/

## 2019-07-01 ASSESSMENT — REFRACTION_AUTOREFRACTION
OD_SPHERE: PLANO
OS_SPHERE: +0.25
OS_CYLINDER: -0.75
OD_AXIS: 115
OD_CYLINDER: -0.25
OS_AXIS: 101

## 2019-07-01 ASSESSMENT — CONFRONTATIONAL VISUAL FIELD TEST (CVF)
OD_FINDINGS: FULL
OS_FINDINGS: FULL

## 2019-07-01 ASSESSMENT — SUPERFICIAL PUNCTATE KERATITIS (SPK)
OS_SPK: T
OD_SPK: T

## 2019-07-01 ASSESSMENT — VISUAL ACUITY
OS_BCVA: 20/20-1
OD_BCVA: 20/25+2

## 2019-07-09 ENCOUNTER — DOCTOR'S OFFICE (OUTPATIENT)
Dept: URBAN - METROPOLITAN AREA CLINIC 136 | Facility: CLINIC | Age: 79
Setting detail: OPHTHALMOLOGY
End: 2019-07-09
Payer: COMMERCIAL

## 2019-07-09 DIAGNOSIS — H35.371: ICD-10-CM

## 2019-07-09 DIAGNOSIS — H35.81: ICD-10-CM

## 2019-07-09 DIAGNOSIS — H27.8: ICD-10-CM

## 2019-07-09 DIAGNOSIS — H40.1111: ICD-10-CM

## 2019-07-09 PROCEDURE — 92014 COMPRE OPH EXAM EST PT 1/>: CPT | Performed by: OPHTHALMOLOGY

## 2019-07-09 PROCEDURE — 92134 CPTRZ OPH DX IMG PST SGM RTA: CPT | Performed by: OPHTHALMOLOGY

## 2019-07-09 ASSESSMENT — REFRACTION_MANIFEST
OD_ADD: +2.25
OD_CYLINDER: SPH
OS_VA1: 20/
OD_VA1: 20/
OS_VA1: 20/20
OU_VA: 20/20
OD_VA1: 20/20-2
OD_VA3: 20/
OU_VA: 20/
OD_SPHERE: -0.50
OS_SPHERE: PLANO
OS_AXIS: 080
OD_VA2: 20/20
OD_VA2: 20/
OS_VA3: 20/
OS_VA3: 20/
OD_VA3: 20/
OS_ADD: +2.25
OS_VA2: 20/20
OS_VA2: 20/
OS_CYLINDER: -0.75

## 2019-07-09 ASSESSMENT — REFRACTION_CURRENTRX
OS_OVR_VA: 20/
OD_CYLINDER: SPHERE
OS_CYLINDER: -0.75
OS_ADD: +2.25
OD_OVR_VA: 20/
OS_VPRISM_DIRECTION: PROGS
OD_VPRISM_DIRECTION: PROGS
OS_SPHERE: PLANO
OD_OVR_VA: 20/
OD_ADD: +2.25
OS_OVR_VA: 20/
OD_OVR_VA: 20/
OS_OVR_VA: 20/
OS_AXIS: 080
OD_SPHERE: -0.50

## 2019-07-09 ASSESSMENT — CONFRONTATIONAL VISUAL FIELD TEST (CVF)
OS_FINDINGS: FULL
OD_FINDINGS: FULL

## 2019-07-09 ASSESSMENT — REFRACTION_AUTOREFRACTION
OD_CYLINDER: -0.25
OS_AXIS: 101
OD_AXIS: 115
OD_SPHERE: PLANO
OS_SPHERE: +0.25
OS_CYLINDER: -0.75

## 2019-07-09 ASSESSMENT — SUPERFICIAL PUNCTATE KERATITIS (SPK)
OS_SPK: T
OD_SPK: T

## 2019-07-09 ASSESSMENT — VISUAL ACUITY
OS_BCVA: 20/20
OD_BCVA: 20/20-1

## 2019-07-09 ASSESSMENT — SPHEQUIV_DERIVED: OS_SPHEQUIV: -0.125

## 2019-09-13 ENCOUNTER — OPTICAL OFFICE (OUTPATIENT)
Dept: URBAN - NONMETROPOLITAN AREA CLINIC 4 | Facility: CLINIC | Age: 79
Setting detail: OPHTHALMOLOGY
End: 2019-09-13

## 2019-09-13 DIAGNOSIS — H52.7: ICD-10-CM

## 2019-09-13 PROCEDURE — V2020 VISION SVCS FRAMES PURCHASES: HCPCS | Performed by: OPHTHALMOLOGY

## 2019-10-08 ENCOUNTER — DOCTOR'S OFFICE (OUTPATIENT)
Dept: URBAN - METROPOLITAN AREA CLINIC 136 | Facility: CLINIC | Age: 79
Setting detail: OPHTHALMOLOGY
End: 2019-10-08
Payer: COMMERCIAL

## 2019-10-08 DIAGNOSIS — H43.812: ICD-10-CM

## 2019-10-08 DIAGNOSIS — H35.81: ICD-10-CM

## 2019-10-08 DIAGNOSIS — H40.1111: ICD-10-CM

## 2019-10-08 DIAGNOSIS — H35.371: ICD-10-CM

## 2019-10-08 PROCEDURE — 92012 INTRM OPH EXAM EST PATIENT: CPT | Performed by: OPHTHALMOLOGY

## 2019-10-08 PROCEDURE — 92134 CPTRZ OPH DX IMG PST SGM RTA: CPT | Performed by: OPHTHALMOLOGY

## 2019-10-08 ASSESSMENT — REFRACTION_MANIFEST
OD_VA2: 20/20
OD_VA1: 20/20-2
OD_CYLINDER: SPH
OD_ADD: +2.25
OD_VA1: 20/
OS_AXIS: 080
OS_VA3: 20/
OU_VA: 20/
OU_VA: 20/20
OS_SPHERE: PLANO
OS_CYLINDER: -0.75
OD_SPHERE: -0.50
OS_VA1: 20/
OD_VA2: 20/
OD_VA3: 20/
OS_ADD: +2.25
OS_VA1: 20/20
OS_VA2: 20/20
OS_VA2: 20/
OS_VA3: 20/
OD_VA3: 20/

## 2019-10-08 ASSESSMENT — REFRACTION_CURRENTRX
OD_OVR_VA: 20/
OS_SPHERE: PLANO
OS_OVR_VA: 20/
OD_SPHERE: -0.50
OD_VPRISM_DIRECTION: PROGS
OS_VPRISM_DIRECTION: PROGS
OS_AXIS: 080
OS_OVR_VA: 20/
OS_ADD: +2.25
OD_CYLINDER: SPHERE
OS_OVR_VA: 20/
OD_OVR_VA: 20/
OD_OVR_VA: 20/
OS_CYLINDER: -0.75
OD_ADD: +2.25

## 2019-10-08 ASSESSMENT — REFRACTION_AUTOREFRACTION
OD_CYLINDER: -0.25
OS_CYLINDER: -0.75
OD_AXIS: 115
OD_SPHERE: PLANO
OS_SPHERE: +0.25
OS_AXIS: 101

## 2019-10-08 ASSESSMENT — CONFRONTATIONAL VISUAL FIELD TEST (CVF)
OS_FINDINGS: FULL
OD_FINDINGS: FULL

## 2019-10-08 ASSESSMENT — VISUAL ACUITY
OD_BCVA: 20/20-1
OS_BCVA: 20/25+1

## 2019-10-08 ASSESSMENT — SUPERFICIAL PUNCTATE KERATITIS (SPK)
OS_SPK: T
OD_SPK: T

## 2019-10-08 ASSESSMENT — SPHEQUIV_DERIVED: OS_SPHEQUIV: -0.125

## 2019-11-18 ENCOUNTER — DOCTOR'S OFFICE (OUTPATIENT)
Dept: URBAN - METROPOLITAN AREA CLINIC 136 | Facility: CLINIC | Age: 79
Setting detail: OPHTHALMOLOGY
End: 2019-11-18
Payer: COMMERCIAL

## 2019-11-18 DIAGNOSIS — H35.371: ICD-10-CM

## 2019-11-18 DIAGNOSIS — H04.123: ICD-10-CM

## 2019-11-18 DIAGNOSIS — H27.8: ICD-10-CM

## 2019-11-18 DIAGNOSIS — H35.81: ICD-10-CM

## 2019-11-18 DIAGNOSIS — H43.812: ICD-10-CM

## 2019-11-18 DIAGNOSIS — H40.1111: ICD-10-CM

## 2019-11-18 PROCEDURE — 92083 EXTENDED VISUAL FIELD XM: CPT | Performed by: OPHTHALMOLOGY

## 2019-11-18 PROCEDURE — 76514 ECHO EXAM OF EYE THICKNESS: CPT | Performed by: OPHTHALMOLOGY

## 2019-11-18 PROCEDURE — 92014 COMPRE OPH EXAM EST PT 1/>: CPT | Performed by: OPHTHALMOLOGY

## 2019-11-18 ASSESSMENT — REFRACTION_MANIFEST
OS_VA3: 20/
OS_ADD: +2.25
OS_SPHERE: PLANO
OS_VA1: 20/
OD_VA3: 20/
OS_VA1: 20/20
OD_CYLINDER: SPH
OS_CYLINDER: -0.75
OD_VA2: 20/
OS_VA2: 20/20
OS_AXIS: 080
OD_VA2: 20/20
OU_VA: 20/
OD_VA1: 20/20-2
OD_VA3: 20/
OD_VA1: 20/
OD_SPHERE: -0.50
OD_ADD: +2.25
OS_VA2: 20/
OS_VA3: 20/
OU_VA: 20/20

## 2019-11-18 ASSESSMENT — REFRACTION_AUTOREFRACTION
OS_AXIS: 101
OD_CYLINDER: -0.25
OS_SPHERE: +0.25
OS_CYLINDER: -0.75
OD_SPHERE: PLANO
OD_AXIS: 115

## 2019-11-18 ASSESSMENT — CONFRONTATIONAL VISUAL FIELD TEST (CVF)
OD_FINDINGS: FULL
OS_FINDINGS: FULL

## 2019-11-18 ASSESSMENT — REFRACTION_CURRENTRX
OS_OVR_VA: 20/
OS_OVR_VA: 20/
OD_CYLINDER: SPHERE
OS_ADD: +2.25
OS_OVR_VA: 20/
OD_VPRISM_DIRECTION: PROGS
OD_OVR_VA: 20/
OS_AXIS: 080
OS_CYLINDER: -0.75
OD_OVR_VA: 20/
OD_ADD: +2.25
OS_VPRISM_DIRECTION: PROGS
OD_OVR_VA: 20/
OD_SPHERE: -0.50
OS_SPHERE: PLANO

## 2019-11-18 ASSESSMENT — SPHEQUIV_DERIVED: OS_SPHEQUIV: -0.125

## 2019-11-18 ASSESSMENT — PACHYMETRY
OD_CT_CORRECTION: -1
OD_CT_UM: 553
OS_CT_UM: 546

## 2019-11-18 ASSESSMENT — VISUAL ACUITY
OS_BCVA: 20/25-1
OD_BCVA: 20/30-2

## 2019-11-18 ASSESSMENT — SUPERFICIAL PUNCTATE KERATITIS (SPK)
OD_SPK: T
OS_SPK: T

## 2019-11-19 ENCOUNTER — AMBUL SURGICAL CARE (OUTPATIENT)
Dept: URBAN - NONMETROPOLITAN AREA SURGERY 1 | Facility: SURGERY | Age: 79
Setting detail: OPHTHALMOLOGY
End: 2019-11-19
Payer: COMMERCIAL

## 2019-11-19 DIAGNOSIS — H40.1111: ICD-10-CM

## 2019-11-19 PROCEDURE — G8907 PT DOC NO EVENTS ON DISCHARG: HCPCS | Performed by: CLINIC/CENTER

## 2019-11-19 PROCEDURE — G8918 PT W/O PREOP ORDER IV AB PRO: HCPCS | Performed by: OPHTHALMOLOGY

## 2019-11-19 PROCEDURE — 65855 TRABECULOPLASTY LASER SURG: CPT | Performed by: OPHTHALMOLOGY

## 2019-11-19 PROCEDURE — G8918 PT W/O PREOP ORDER IV AB PRO: HCPCS | Performed by: CLINIC/CENTER

## 2019-11-19 PROCEDURE — G8907 PT DOC NO EVENTS ON DISCHARG: HCPCS | Performed by: OPHTHALMOLOGY

## 2019-11-19 PROCEDURE — 65855 TRABECULOPLASTY LASER SURG: CPT | Performed by: CLINIC/CENTER

## 2019-11-26 ENCOUNTER — AMBUL SURGICAL CARE (OUTPATIENT)
Dept: URBAN - NONMETROPOLITAN AREA SURGERY 1 | Facility: SURGERY | Age: 79
Setting detail: OPHTHALMOLOGY
End: 2019-11-26
Payer: COMMERCIAL

## 2019-11-26 DIAGNOSIS — H40.1121: ICD-10-CM

## 2019-11-26 PROCEDURE — G8907 PT DOC NO EVENTS ON DISCHARG: HCPCS | Performed by: OPHTHALMOLOGY

## 2019-11-26 PROCEDURE — 65855 TRABECULOPLASTY LASER SURG: CPT | Performed by: OPHTHALMOLOGY

## 2019-11-26 PROCEDURE — G8907 PT DOC NO EVENTS ON DISCHARG: HCPCS | Performed by: CLINIC/CENTER

## 2019-11-26 PROCEDURE — G8918 PT W/O PREOP ORDER IV AB PRO: HCPCS | Performed by: OPHTHALMOLOGY

## 2019-11-26 PROCEDURE — 65855 TRABECULOPLASTY LASER SURG: CPT | Performed by: CLINIC/CENTER

## 2019-11-26 PROCEDURE — G8918 PT W/O PREOP ORDER IV AB PRO: HCPCS | Performed by: CLINIC/CENTER

## 2020-01-06 ENCOUNTER — DOCTOR'S OFFICE (OUTPATIENT)
Dept: URBAN - METROPOLITAN AREA CLINIC 136 | Facility: CLINIC | Age: 80
Setting detail: OPHTHALMOLOGY
End: 2020-01-06
Payer: COMMERCIAL

## 2020-01-06 DIAGNOSIS — H04.122: ICD-10-CM

## 2020-01-06 DIAGNOSIS — H40.1111: ICD-10-CM

## 2020-01-06 DIAGNOSIS — H01.002: ICD-10-CM

## 2020-01-06 DIAGNOSIS — H04.121: ICD-10-CM

## 2020-01-06 DIAGNOSIS — H10.503: ICD-10-CM

## 2020-01-06 DIAGNOSIS — H01.001: ICD-10-CM

## 2020-01-06 PROCEDURE — 83861 MICROFLUID ANALY TEARS: CPT | Performed by: OPHTHALMOLOGY

## 2020-01-06 PROCEDURE — 92012 INTRM OPH EXAM EST PATIENT: CPT | Performed by: OPHTHALMOLOGY

## 2020-01-06 ASSESSMENT — CONFRONTATIONAL VISUAL FIELD TEST (CVF)
OD_FINDINGS: FULL
OS_FINDINGS: FULL

## 2020-01-06 ASSESSMENT — REFRACTION_MANIFEST
OD_SPHERE: -0.50
OS_AXIS: 080
OS_VA3: 20/
OD_CYLINDER: SPH
OS_SPHERE: PLANO
OD_VA2: 20/20
OS_ADD: +2.25
OD_ADD: +2.25
OD_VA1: 20/20-2
OS_CYLINDER: -0.75
OS_VA2: 20/20
OS_VA1: 20/20
OU_VA: 20/20
OD_VA3: 20/

## 2020-01-06 ASSESSMENT — SUPERFICIAL PUNCTATE KERATITIS (SPK)
OS_SPK: T
OD_SPK: T

## 2020-01-06 ASSESSMENT — REFRACTION_CURRENTRX
OD_OVR_VA: 20/
OS_CYLINDER: -0.75
OS_VPRISM_DIRECTION: PROGS
OS_SPHERE: PLANO
OS_OVR_VA: 20/
OD_SPHERE: -0.50
OS_AXIS: 080
OD_CYLINDER: SPHERE
OS_ADD: +2.25
OD_ADD: +2.25
OD_VPRISM_DIRECTION: PROGS

## 2020-01-06 ASSESSMENT — VISUAL ACUITY
OS_BCVA: 20/30-2
OD_BCVA: 20/25

## 2020-01-06 ASSESSMENT — KERATOMETRY
OD_K2POWER_DIOPTERS: 46.00
OD_AXISANGLE_DEGREES: 011
OS_AXISANGLE_DEGREES: 120
OS_K1POWER_DIOPTERS: 45.75
OD_K1POWER_DIOPTERS: 45.75
OS_K2POWER_DIOPTERS: 46.00

## 2020-01-06 ASSESSMENT — REFRACTION_AUTOREFRACTION
OD_AXIS: 115
OS_SPHERE: +0.25
OD_SPHERE: PLANO
OS_CYLINDER: -0.75
OD_CYLINDER: -0.25
OS_AXIS: 101

## 2020-01-06 ASSESSMENT — SPHEQUIV_DERIVED: OS_SPHEQUIV: -0.125

## 2020-01-06 ASSESSMENT — LID EXAM ASSESSMENTS
OS_BLEPHARITIS: LLL LUL
OD_BLEPHARITIS: RLL RUL

## 2020-01-06 ASSESSMENT — AXIALLENGTH_DERIVED: OS_AL: 22.796

## 2020-02-11 ENCOUNTER — DOCTOR'S OFFICE (OUTPATIENT)
Dept: URBAN - NONMETROPOLITAN AREA CLINIC 1 | Facility: CLINIC | Age: 80
Setting detail: OPHTHALMOLOGY
End: 2020-02-11
Payer: COMMERCIAL

## 2020-02-11 VITALS — HEIGHT: 60 IN

## 2020-02-11 DIAGNOSIS — H27.8: ICD-10-CM

## 2020-02-11 DIAGNOSIS — H01.004: ICD-10-CM

## 2020-02-11 DIAGNOSIS — H10.503: ICD-10-CM

## 2020-02-11 DIAGNOSIS — H01.005: ICD-10-CM

## 2020-02-11 DIAGNOSIS — H01.001: ICD-10-CM

## 2020-02-11 DIAGNOSIS — H01.002: ICD-10-CM

## 2020-02-11 DIAGNOSIS — H40.1111: ICD-10-CM

## 2020-02-11 PROCEDURE — 99214 OFFICE O/P EST MOD 30 MIN: CPT | Performed by: OPHTHALMOLOGY

## 2020-02-11 ASSESSMENT — REFRACTION_CURRENTRX
OD_ADD: +2.25
OS_AXIS: 080
OD_VPRISM_DIRECTION: PROGS
OS_OVR_VA: 20/
OS_SPHERE: PLANO
OD_SPHERE: -0.50
OD_CYLINDER: SPHERE
OS_VPRISM_DIRECTION: PROGS
OS_ADD: +2.25
OD_OVR_VA: 20/
OS_CYLINDER: -0.75

## 2020-02-11 ASSESSMENT — REFRACTION_MANIFEST
OD_ADD: +2.25
OD_SPHERE: -0.50
OS_ADD: +2.25
OD_VA1: 20/20-2
OU_VA: 20/20
OS_CYLINDER: -0.75
OS_SPHERE: PLANO
OS_VA1: 20/20
OD_VA2: 20/20
OS_AXIS: 080
OD_CYLINDER: SPH
OS_VA2: 20/20

## 2020-02-11 ASSESSMENT — KERATOMETRY
OS_K1POWER_DIOPTERS: 45.75
OS_AXISANGLE_DEGREES: 120
OD_K2POWER_DIOPTERS: 46.00
OS_K2POWER_DIOPTERS: 46.00
OD_K1POWER_DIOPTERS: 45.75
OD_AXISANGLE_DEGREES: 011

## 2020-02-11 ASSESSMENT — REFRACTION_AUTOREFRACTION
OS_CYLINDER: -0.75
OD_AXIS: 115
OS_SPHERE: +0.25
OD_CYLINDER: -0.25
OS_AXIS: 101
OD_SPHERE: PLANO

## 2020-02-11 ASSESSMENT — SUPERFICIAL PUNCTATE KERATITIS (SPK)
OD_SPK: T
OS_SPK: T

## 2020-02-11 ASSESSMENT — VISUAL ACUITY
OS_BCVA: 20/25-1
OD_BCVA: 20/25

## 2020-02-11 ASSESSMENT — CONFRONTATIONAL VISUAL FIELD TEST (CVF)
OS_FINDINGS: FULL
OD_FINDINGS: FULL

## 2020-02-11 ASSESSMENT — LID EXAM ASSESSMENTS
OD_BLEPHARITIS: RLL RUL
OS_BLEPHARITIS: LLL LUL

## 2020-02-11 ASSESSMENT — SPHEQUIV_DERIVED: OS_SPHEQUIV: -0.125

## 2020-02-11 ASSESSMENT — AXIALLENGTH_DERIVED: OS_AL: 22.796

## 2020-02-19 ENCOUNTER — DOCTOR'S OFFICE (OUTPATIENT)
Dept: URBAN - NONMETROPOLITAN AREA CLINIC 1 | Facility: CLINIC | Age: 80
Setting detail: OPHTHALMOLOGY
End: 2020-02-19

## 2020-02-19 DIAGNOSIS — H52.13: ICD-10-CM

## 2020-02-19 DIAGNOSIS — H52.4: ICD-10-CM

## 2020-02-19 DIAGNOSIS — H52.223: ICD-10-CM

## 2020-02-19 PROCEDURE — 92015 DETERMINE REFRACTIVE STATE: CPT | Performed by: OPTOMETRIST

## 2020-02-19 ASSESSMENT — REFRACTION_MANIFEST
OD_VA1: 20/20-2
OS_ADD: +2.50
OS_CYLINDER: -1.25
OD_ADD: +2.50
OD_CYLINDER: -0.25
OD_AXIS: 115
OS_VA2: 20/20
OS_SPHERE: +0.50
OS_AXIS: 090
OD_SPHERE: -0.50
OS_VA1: 20/20
OD_VA2: 20/20
OU_VA: 20/20

## 2020-02-19 ASSESSMENT — AXIALLENGTH_DERIVED
OD_AL: 22.9794
OS_AL: 22.8416
OS_AL: 22.796

## 2020-02-19 ASSESSMENT — REFRACTION_CURRENTRX
OS_AXIS: 076
OD_OVR_VA: 20/
OD_VPRISM_DIRECTION: PROGS
OD_SPHERE: -0.25
OS_OVR_VA: 20/
OS_ADD: +2.25
OS_CYLINDER: -0.75
OS_SPHERE: PLANO
OS_VPRISM_DIRECTION: PROGS
OD_CYLINDER: SPHERE
OD_ADD: +2.25

## 2020-02-19 ASSESSMENT — KERATOMETRY
OS_K2POWER_DIOPTERS: 46.00
OD_AXISANGLE_DEGREES: 011
OS_K1POWER_DIOPTERS: 45.75
OD_K2POWER_DIOPTERS: 46.00
OS_AXISANGLE_DEGREES: 120
OD_K1POWER_DIOPTERS: 45.75

## 2020-02-19 ASSESSMENT — SPHEQUIV_DERIVED
OS_SPHEQUIV: -0.125
OD_SPHEQUIV: -0.625
OS_SPHEQUIV: -0.25

## 2020-02-19 ASSESSMENT — VISUAL ACUITY
OS_BCVA: 20/30+2
OD_BCVA: 20/30+1

## 2020-02-19 ASSESSMENT — REFRACTION_AUTOREFRACTION
OS_SPHERE: +0.50
OS_AXIS: 093
OS_CYLINDER: -1.50
OD_SPHERE: -0.75
OD_CYLINDER: SPH

## 2020-09-09 ENCOUNTER — DOCTOR'S OFFICE (OUTPATIENT)
Dept: URBAN - NONMETROPOLITAN AREA CLINIC 1 | Facility: CLINIC | Age: 80
Setting detail: OPHTHALMOLOGY
End: 2020-09-09
Payer: COMMERCIAL

## 2020-09-09 ENCOUNTER — RX ONLY (RX ONLY)
Age: 80
End: 2020-09-09

## 2020-09-09 DIAGNOSIS — H27.8: ICD-10-CM

## 2020-09-09 DIAGNOSIS — H40.1111: ICD-10-CM

## 2020-09-09 DIAGNOSIS — H47.011: ICD-10-CM

## 2020-09-09 PROCEDURE — 92133 CPTRZD OPH DX IMG PST SGM ON: CPT | Performed by: OPHTHALMOLOGY

## 2020-09-09 PROCEDURE — 92012 INTRM OPH EXAM EST PATIENT: CPT | Performed by: OPHTHALMOLOGY

## 2020-09-09 ASSESSMENT — REFRACTION_AUTOREFRACTION
OD_SPHERE: -0.75
OS_AXIS: 093
OD_CYLINDER: SPH
OS_CYLINDER: -1.50
OS_SPHERE: +0.50

## 2020-09-09 ASSESSMENT — KERATOMETRY
OS_K2POWER_DIOPTERS: 46.00
OD_K2POWER_DIOPTERS: 46.00
OS_K1POWER_DIOPTERS: 45.75
OD_K1POWER_DIOPTERS: 45.75
OD_AXISANGLE_DEGREES: 011
OS_AXISANGLE_DEGREES: 120

## 2020-09-09 ASSESSMENT — REFRACTION_MANIFEST
OS_VA2: 20/20
OD_CYLINDER: -0.25
OD_VA2: 20/20
OS_AXIS: 090
OD_SPHERE: -0.50
OU_VA: 20/20
OD_AXIS: 115
OS_VA1: 20/20
OD_VA1: 20/20-2
OD_ADD: +2.50
OS_ADD: +2.50
OS_SPHERE: +0.50
OS_CYLINDER: -1.25

## 2020-09-09 ASSESSMENT — CONFRONTATIONAL VISUAL FIELD TEST (CVF)
OD_FINDINGS: FULL
OS_FINDINGS: FULL

## 2020-09-09 ASSESSMENT — REFRACTION_CURRENTRX
OD_VPRISM_DIRECTION: PROGS
OD_AXIS: 122
OS_VPRISM_DIRECTION: PROGS
OS_CYLINDER: -1.25
OS_OVR_VA: 20/
OS_ADD: +2.50
OS_AXIS: 088
OS_SPHERE: +0.50
OD_SPHERE: -0.50
OD_OVR_VA: 20/
OD_ADD: +2.50
OD_CYLINDER: -0.25

## 2020-09-09 ASSESSMENT — VISUAL ACUITY
OS_BCVA: 20/25-2
OD_BCVA: 20/25

## 2020-09-09 ASSESSMENT — SUPERFICIAL PUNCTATE KERATITIS (SPK)
OD_SPK: T
OS_SPK: T

## 2020-09-09 ASSESSMENT — AXIALLENGTH_DERIVED
OS_AL: 22.796
OS_AL: 22.8416
OD_AL: 22.9794

## 2020-09-09 ASSESSMENT — SPHEQUIV_DERIVED
OD_SPHEQUIV: -0.625
OS_SPHEQUIV: -0.25
OS_SPHEQUIV: -0.125

## 2020-09-09 ASSESSMENT — LID EXAM ASSESSMENTS
OD_BLEPHARITIS: RLL RUL
OS_BLEPHARITIS: LLL LUL

## 2020-09-14 ENCOUNTER — DOCTOR'S OFFICE (OUTPATIENT)
Dept: URBAN - NONMETROPOLITAN AREA CLINIC 1 | Facility: CLINIC | Age: 80
Setting detail: OPHTHALMOLOGY
End: 2020-09-14
Payer: COMMERCIAL

## 2020-09-14 ENCOUNTER — RX ONLY (RX ONLY)
Age: 80
End: 2020-09-14

## 2020-09-14 VITALS — HEIGHT: 60 IN

## 2020-09-14 DIAGNOSIS — H47.011: ICD-10-CM

## 2020-09-14 DIAGNOSIS — H35.81: ICD-10-CM

## 2020-09-14 DIAGNOSIS — H40.1111: ICD-10-CM

## 2020-09-14 PROCEDURE — 92020 GONIOSCOPY: CPT | Performed by: OPHTHALMOLOGY

## 2020-09-14 PROCEDURE — 92014 COMPRE OPH EXAM EST PT 1/>: CPT | Performed by: OPHTHALMOLOGY

## 2020-09-14 PROCEDURE — 92202 OPSCPY EXTND ON/MAC DRAW: CPT | Performed by: OPHTHALMOLOGY

## 2020-09-14 ASSESSMENT — CONFRONTATIONAL VISUAL FIELD TEST (CVF)
OD_FINDINGS: FULL
OS_FINDINGS: FULL

## 2020-09-14 ASSESSMENT — SUPERFICIAL PUNCTATE KERATITIS (SPK)
OS_SPK: T
OD_SPK: T

## 2020-09-14 ASSESSMENT — LID EXAM ASSESSMENTS
OD_BLEPHARITIS: RLL RUL
OS_BLEPHARITIS: LLL LUL

## 2020-10-11 ASSESSMENT — REFRACTION_MANIFEST
OD_AXIS: 115
OS_VA2: 20/20
OD_SPHERE: -0.50
OS_VA1: 20/20
OS_SPHERE: +0.50
OS_AXIS: 090
OD_VA2: 20/20
OS_ADD: +2.50
OS_CYLINDER: -1.25
OD_CYLINDER: -0.25
OD_VA1: 20/20-2
OD_ADD: +2.50
OU_VA: 20/20

## 2020-10-11 ASSESSMENT — REFRACTION_CURRENTRX
OD_ADD: +2.50
OS_VPRISM_DIRECTION: PROGS
OD_CYLINDER: -0.25
OS_CYLINDER: -1.25
OS_SPHERE: +0.50
OD_VPRISM_DIRECTION: PROGS
OD_AXIS: 122
OD_OVR_VA: 20/
OS_AXIS: 088
OS_OVR_VA: 20/
OD_SPHERE: -0.50
OS_ADD: +2.50

## 2020-10-11 ASSESSMENT — REFRACTION_AUTOREFRACTION
OD_AXIS: 135
OS_AXIS: 096
OS_CYLINDER: -1.25
OS_SPHERE: +0.25
OD_CYLINDER: -0.50
OD_SPHERE: -0.50

## 2020-10-11 ASSESSMENT — KERATOMETRY
OD_K1POWER_DIOPTERS: 45.75
OD_AXISANGLE_DEGREES: 011
OS_AXISANGLE_DEGREES: 120
OS_K1POWER_DIOPTERS: 45.75
OD_K2POWER_DIOPTERS: 46.00
OS_K2POWER_DIOPTERS: 46.00

## 2020-10-11 ASSESSMENT — SPHEQUIV_DERIVED
OS_SPHEQUIV: -0.375
OD_SPHEQUIV: -0.625
OS_SPHEQUIV: -0.125
OD_SPHEQUIV: -0.75

## 2020-10-11 ASSESSMENT — VISUAL ACUITY
OD_BCVA: 20/25
OS_BCVA: 20/25

## 2020-10-11 ASSESSMENT — AXIALLENGTH_DERIVED
OS_AL: 22.8873
OS_AL: 22.796
OD_AL: 22.9794
OD_AL: 23.0257

## 2020-10-12 ENCOUNTER — RX ONLY (RX ONLY)
Age: 80
End: 2020-10-12

## 2020-10-12 ENCOUNTER — DOCTOR'S OFFICE (OUTPATIENT)
Dept: URBAN - NONMETROPOLITAN AREA CLINIC 1 | Facility: CLINIC | Age: 80
Setting detail: OPHTHALMOLOGY
End: 2020-10-12
Payer: COMMERCIAL

## 2020-10-12 VITALS — HEIGHT: 60 IN

## 2020-10-12 DIAGNOSIS — H40.1113: ICD-10-CM

## 2020-10-12 DIAGNOSIS — H40.1121: ICD-10-CM

## 2020-10-12 DIAGNOSIS — H04.122: ICD-10-CM

## 2020-10-12 DIAGNOSIS — H47.011: ICD-10-CM

## 2020-10-12 DIAGNOSIS — H04.121: ICD-10-CM

## 2020-10-12 PROCEDURE — 92012 INTRM OPH EXAM EST PATIENT: CPT | Performed by: OPHTHALMOLOGY

## 2020-10-12 ASSESSMENT — LID EXAM ASSESSMENTS
OS_BLEPHARITIS: LLL LUL
OD_BLEPHARITIS: RLL RUL

## 2020-10-12 ASSESSMENT — CONFRONTATIONAL VISUAL FIELD TEST (CVF)
OD_FINDINGS: FULL
OS_FINDINGS: FULL

## 2020-10-12 ASSESSMENT — SUPERFICIAL PUNCTATE KERATITIS (SPK)
OS_SPK: T
OD_SPK: T

## 2020-11-08 ASSESSMENT — REFRACTION_MANIFEST
OS_SPHERE: +0.50
OS_VA1: 20/20
OD_VA2: 20/20
OS_CYLINDER: -1.25
OS_ADD: +2.50
OD_VA1: 20/20-2
OD_AXIS: 115
OD_CYLINDER: -0.25
OU_VA: 20/20
OD_SPHERE: -0.50
OS_AXIS: 090
OS_VA2: 20/20
OD_ADD: +2.50

## 2020-11-08 ASSESSMENT — AXIALLENGTH_DERIVED
OD_AL: 23.0257
OS_AL: 22.796
OD_AL: 22.9794
OS_AL: 22.8873

## 2020-11-08 ASSESSMENT — KERATOMETRY
OD_K2POWER_DIOPTERS: 46.00
OS_AXISANGLE_DEGREES: 120
OD_K1POWER_DIOPTERS: 45.75
OS_K1POWER_DIOPTERS: 45.75
OS_K2POWER_DIOPTERS: 46.00
OD_AXISANGLE_DEGREES: 011

## 2020-11-08 ASSESSMENT — REFRACTION_CURRENTRX
OS_AXIS: 088
OS_ADD: +2.50
OS_OVR_VA: 20/
OD_AXIS: 122
OD_SPHERE: -0.50
OD_ADD: +2.50
OD_OVR_VA: 20/
OS_CYLINDER: -1.25
OS_VPRISM_DIRECTION: PROGS
OD_CYLINDER: -0.25
OS_SPHERE: +0.50
OD_VPRISM_DIRECTION: PROGS

## 2020-11-08 ASSESSMENT — SPHEQUIV_DERIVED
OS_SPHEQUIV: -0.375
OS_SPHEQUIV: -0.125
OD_SPHEQUIV: -0.625
OD_SPHEQUIV: -0.75

## 2020-11-08 ASSESSMENT — REFRACTION_AUTOREFRACTION
OD_SPHERE: -0.50
OD_AXIS: 135
OD_CYLINDER: -0.50
OS_SPHERE: +0.25
OS_AXIS: 096
OS_CYLINDER: -1.25

## 2020-11-08 ASSESSMENT — VISUAL ACUITY
OS_BCVA: 20/25
OD_BCVA: 20/25

## 2020-11-09 ENCOUNTER — DOCTOR'S OFFICE (OUTPATIENT)
Dept: URBAN - NONMETROPOLITAN AREA CLINIC 1 | Facility: CLINIC | Age: 80
Setting detail: OPHTHALMOLOGY
End: 2020-11-09
Payer: COMMERCIAL

## 2020-11-09 VITALS — HEIGHT: 60 IN

## 2020-11-09 DIAGNOSIS — H40.1113: ICD-10-CM

## 2020-11-09 DIAGNOSIS — H47.011: ICD-10-CM

## 2020-11-09 DIAGNOSIS — H40.1121: ICD-10-CM

## 2020-11-09 DIAGNOSIS — H35.81: ICD-10-CM

## 2020-11-09 DIAGNOSIS — H04.122: ICD-10-CM

## 2020-11-09 DIAGNOSIS — H04.121: ICD-10-CM

## 2020-11-09 PROCEDURE — 92014 COMPRE OPH EXAM EST PT 1/>: CPT | Performed by: OPHTHALMOLOGY

## 2020-11-09 ASSESSMENT — PACHYMETRY
OD_CT_UM: 553
OD_CT_CORRECTION: -1

## 2020-11-09 ASSESSMENT — REFRACTION_MANIFEST
OS_VA2: 20/20
OS_VA1: 20/20
OS_CYLINDER: -1.25
OD_VA1: 20/20-2
OS_SPHERE: +0.50
OD_VA2: 20/20
OD_SPHERE: -0.50
OD_ADD: +2.50
OS_AXIS: 090
OS_ADD: +2.50
OU_VA: 20/20
OD_CYLINDER: -0.25
OD_AXIS: 115

## 2020-11-09 ASSESSMENT — REFRACTION_CURRENTRX
OS_AXIS: 088
OS_ADD: +2.50
OD_ADD: +2.50
OS_OVR_VA: 20/
OD_SPHERE: -0.50
OD_OVR_VA: 20/
OS_SPHERE: +0.50
OD_AXIS: 122
OS_CYLINDER: -1.25
OD_CYLINDER: -0.25
OS_VPRISM_DIRECTION: PROGS
OD_VPRISM_DIRECTION: PROGS

## 2020-11-09 ASSESSMENT — KERATOMETRY
OS_AXISANGLE_DEGREES: 120
OD_K1POWER_DIOPTERS: 45.75
OS_K2POWER_DIOPTERS: 46.00
OD_AXISANGLE_DEGREES: 011
OS_K1POWER_DIOPTERS: 45.75
OD_K2POWER_DIOPTERS: 46.00

## 2020-11-09 ASSESSMENT — SUPERFICIAL PUNCTATE KERATITIS (SPK)
OS_SPK: T
OD_SPK: T

## 2020-11-09 ASSESSMENT — AXIALLENGTH_DERIVED
OS_AL: 22.8873
OD_AL: 22.9794
OS_AL: 22.796
OD_AL: 23.0257

## 2020-11-09 ASSESSMENT — LID EXAM ASSESSMENTS
OD_BLEPHARITIS: RLL RUL
OS_BLEPHARITIS: LLL LUL

## 2020-11-09 ASSESSMENT — REFRACTION_AUTOREFRACTION
OS_SPHERE: +0.25
OD_SPHERE: -0.50
OD_CYLINDER: -0.50
OS_AXIS: 096
OD_AXIS: 135
OS_CYLINDER: -1.25

## 2020-11-09 ASSESSMENT — SPHEQUIV_DERIVED
OS_SPHEQUIV: -0.375
OD_SPHEQUIV: -0.75
OS_SPHEQUIV: -0.125
OD_SPHEQUIV: -0.625

## 2020-11-09 ASSESSMENT — CONFRONTATIONAL VISUAL FIELD TEST (CVF)
OS_FINDINGS: FULL
OD_FINDINGS: FULL

## 2020-11-09 ASSESSMENT — TONOMETRY: OD_IOP_MMHG: 15

## 2020-11-09 ASSESSMENT — VISUAL ACUITY
OS_BCVA: 20/25-1
OD_BCVA: 20/25-1

## 2020-11-23 ENCOUNTER — RX ONLY (RX ONLY)
Age: 80
End: 2020-11-23

## 2020-11-23 ENCOUNTER — DOCTOR'S OFFICE (OUTPATIENT)
Dept: URBAN - NONMETROPOLITAN AREA CLINIC 1 | Facility: CLINIC | Age: 80
Setting detail: OPHTHALMOLOGY
End: 2020-11-23
Payer: COMMERCIAL

## 2020-11-23 DIAGNOSIS — H35.371: ICD-10-CM

## 2020-11-23 DIAGNOSIS — H35.81: ICD-10-CM

## 2020-11-23 DIAGNOSIS — H04.121: ICD-10-CM

## 2020-11-23 DIAGNOSIS — H04.122: ICD-10-CM

## 2020-11-23 PROCEDURE — 92202 OPSCPY EXTND ON/MAC DRAW: CPT | Performed by: OPHTHALMOLOGY

## 2020-11-23 PROCEDURE — 92134 CPTRZ OPH DX IMG PST SGM RTA: CPT | Performed by: OPHTHALMOLOGY

## 2020-11-23 PROCEDURE — 92014 COMPRE OPH EXAM EST PT 1/>: CPT | Performed by: OPHTHALMOLOGY

## 2020-11-23 ASSESSMENT — AXIALLENGTH_DERIVED
OD_AL: 23.0257
OS_AL: 22.8873
OD_AL: 22.9794
OS_AL: 22.796

## 2020-11-23 ASSESSMENT — REFRACTION_CURRENTRX
OD_CYLINDER: -0.25
OD_VPRISM_DIRECTION: PROGS
OD_ADD: +2.50
OS_SPHERE: +0.50
OD_SPHERE: -0.50
OD_OVR_VA: 20/
OS_OVR_VA: 20/
OS_AXIS: 088
OS_CYLINDER: -1.25
OS_ADD: +2.50
OD_AXIS: 122
OS_VPRISM_DIRECTION: PROGS

## 2020-11-23 ASSESSMENT — VISUAL ACUITY
OD_BCVA: 20/20-2
OS_BCVA: 20/25+2

## 2020-11-23 ASSESSMENT — KERATOMETRY
OD_K1POWER_DIOPTERS: 45.75
OS_K2POWER_DIOPTERS: 46.00
OD_K2POWER_DIOPTERS: 46.00
OS_AXISANGLE_DEGREES: 120
OS_K1POWER_DIOPTERS: 45.75
OD_AXISANGLE_DEGREES: 011

## 2020-11-23 ASSESSMENT — REFRACTION_MANIFEST
OD_SPHERE: -0.50
OS_SPHERE: +0.50
OS_VA2: 20/20
OS_VA1: 20/20
OS_ADD: +2.50
OU_VA: 20/20
OS_AXIS: 090
OD_ADD: +2.50
OS_CYLINDER: -1.25
OD_VA1: 20/20-2
OD_VA2: 20/20
OD_CYLINDER: -0.25
OD_AXIS: 115

## 2020-11-23 ASSESSMENT — CONFRONTATIONAL VISUAL FIELD TEST (CVF)
OS_FINDINGS: FULL
OD_FINDINGS: FULL

## 2020-11-23 ASSESSMENT — REFRACTION_AUTOREFRACTION
OD_CYLINDER: -0.50
OD_AXIS: 135
OS_SPHERE: +0.25
OD_SPHERE: -0.50
OS_AXIS: 096
OS_CYLINDER: -1.25

## 2020-11-23 ASSESSMENT — SUPERFICIAL PUNCTATE KERATITIS (SPK)
OS_SPK: T
OD_SPK: T

## 2020-11-23 ASSESSMENT — SPHEQUIV_DERIVED
OD_SPHEQUIV: -0.75
OS_SPHEQUIV: -0.375
OS_SPHEQUIV: -0.125
OD_SPHEQUIV: -0.625

## 2020-11-23 ASSESSMENT — LID EXAM ASSESSMENTS
OD_BLEPHARITIS: RLL RUL
OS_BLEPHARITIS: LLL LUL

## 2020-11-30 ENCOUNTER — DOCTOR'S OFFICE (OUTPATIENT)
Dept: URBAN - NONMETROPOLITAN AREA CLINIC 1 | Facility: CLINIC | Age: 80
Setting detail: OPHTHALMOLOGY
End: 2020-11-30
Payer: COMMERCIAL

## 2020-11-30 DIAGNOSIS — H04.121: ICD-10-CM

## 2020-11-30 DIAGNOSIS — H35.81: ICD-10-CM

## 2020-11-30 DIAGNOSIS — H04.122: ICD-10-CM

## 2020-11-30 DIAGNOSIS — H35.371: ICD-10-CM

## 2020-11-30 PROCEDURE — 92235 FLUORESCEIN ANGRPH MLTIFRAME: CPT | Performed by: OPHTHALMOLOGY

## 2020-11-30 PROCEDURE — 92250 FUNDUS PHOTOGRAPHY W/I&R: CPT | Performed by: OPHTHALMOLOGY

## 2020-11-30 PROCEDURE — 92014 COMPRE OPH EXAM EST PT 1/>: CPT | Performed by: OPHTHALMOLOGY

## 2020-11-30 ASSESSMENT — REFRACTION_MANIFEST
OD_VA1: 20/20-2
OD_SPHERE: -0.50
OS_SPHERE: +0.50
OU_VA: 20/20
OD_ADD: +2.50
OD_AXIS: 115
OS_VA2: 20/20
OD_VA2: 20/20
OS_VA1: 20/20
OS_ADD: +2.50
OS_AXIS: 090
OD_CYLINDER: -0.25
OS_CYLINDER: -1.25

## 2020-11-30 ASSESSMENT — REFRACTION_CURRENTRX
OS_VPRISM_DIRECTION: PROGS
OD_AXIS: 122
OS_CYLINDER: -1.25
OS_AXIS: 088
OS_SPHERE: +0.50
OD_VPRISM_DIRECTION: PROGS
OD_SPHERE: -0.50
OD_CYLINDER: -0.25
OD_ADD: +2.50
OS_ADD: +2.50
OD_OVR_VA: 20/
OS_OVR_VA: 20/

## 2020-11-30 ASSESSMENT — REFRACTION_AUTOREFRACTION
OS_CYLINDER: -1.25
OD_AXIS: 135
OD_SPHERE: -0.50
OS_AXIS: 096
OS_SPHERE: +0.25
OD_CYLINDER: -0.50

## 2020-11-30 ASSESSMENT — AXIALLENGTH_DERIVED
OD_AL: 23.0257
OD_AL: 22.9794
OS_AL: 22.796
OS_AL: 22.8873

## 2020-11-30 ASSESSMENT — VISUAL ACUITY
OS_BCVA: 20/25
OD_BCVA: 20/20-1

## 2020-11-30 ASSESSMENT — LID EXAM ASSESSMENTS
OD_BLEPHARITIS: RLL RUL
OS_BLEPHARITIS: LLL LUL

## 2020-11-30 ASSESSMENT — CONFRONTATIONAL VISUAL FIELD TEST (CVF)
OS_FINDINGS: FULL
OD_FINDINGS: FULL

## 2020-11-30 ASSESSMENT — SUPERFICIAL PUNCTATE KERATITIS (SPK)
OD_SPK: T
OS_SPK: T

## 2020-11-30 ASSESSMENT — SPHEQUIV_DERIVED
OD_SPHEQUIV: -0.75
OS_SPHEQUIV: -0.125
OS_SPHEQUIV: -0.375
OD_SPHEQUIV: -0.625

## 2020-12-11 ENCOUNTER — DOCTOR'S OFFICE (OUTPATIENT)
Dept: URBAN - NONMETROPOLITAN AREA CLINIC 1 | Facility: CLINIC | Age: 80
Setting detail: OPHTHALMOLOGY
End: 2020-12-11
Payer: COMMERCIAL

## 2020-12-11 VITALS — HEIGHT: 60 IN

## 2020-12-11 DIAGNOSIS — H40.1113: ICD-10-CM

## 2020-12-11 DIAGNOSIS — H40.1121: ICD-10-CM

## 2020-12-11 DIAGNOSIS — H47.011: ICD-10-CM

## 2020-12-11 PROCEDURE — 92012 INTRM OPH EXAM EST PATIENT: CPT | Performed by: OPHTHALMOLOGY

## 2020-12-11 ASSESSMENT — SUPERFICIAL PUNCTATE KERATITIS (SPK)
OS_SPK: T
OD_SPK: T

## 2020-12-11 ASSESSMENT — TONOMETRY
OS_IOP_MMHG: 10
OD_IOP_MMHG: 10

## 2020-12-11 ASSESSMENT — CONFRONTATIONAL VISUAL FIELD TEST (CVF)
OD_FINDINGS: FULL
OS_FINDINGS: FULL

## 2020-12-11 ASSESSMENT — LID EXAM ASSESSMENTS
OS_BLEPHARITIS: LLL LUL
OD_BLEPHARITIS: RLL RUL

## 2020-12-11 ASSESSMENT — PACHYMETRY
OD_CT_CORRECTION: -1
OD_CT_UM: 553

## 2020-12-13 ASSESSMENT — REFRACTION_AUTOREFRACTION
OS_SPHERE: +0.25
OS_AXIS: 096
OD_AXIS: 135
OS_CYLINDER: -1.25
OD_CYLINDER: -0.50
OD_SPHERE: -0.50

## 2020-12-13 ASSESSMENT — REFRACTION_CURRENTRX
OD_AXIS: 122
OS_CYLINDER: -1.25
OS_AXIS: 088
OS_SPHERE: +0.50
OD_SPHERE: -0.50
OD_OVR_VA: 20/
OD_VPRISM_DIRECTION: PROGS
OS_VPRISM_DIRECTION: PROGS
OD_ADD: +2.50
OD_CYLINDER: -0.25
OS_ADD: +2.50
OS_OVR_VA: 20/

## 2020-12-13 ASSESSMENT — KERATOMETRY
OS_AXISANGLE_DEGREES: 120
OS_K1POWER_DIOPTERS: 45.75
OD_K2POWER_DIOPTERS: 46.00
OS_K2POWER_DIOPTERS: 46.00
OD_AXISANGLE_DEGREES: 011
OD_K1POWER_DIOPTERS: 45.75

## 2020-12-13 ASSESSMENT — REFRACTION_MANIFEST
OS_AXIS: 090
OD_AXIS: 115
OD_SPHERE: -0.50
OD_CYLINDER: -0.25
OS_SPHERE: +0.50
OD_ADD: +2.50
OD_VA1: 20/20-2
OS_VA2: 20/20
OD_VA2: 20/20
OS_ADD: +2.50
OS_VA1: 20/20
OS_CYLINDER: -1.25
OU_VA: 20/20

## 2020-12-13 ASSESSMENT — AXIALLENGTH_DERIVED
OD_AL: 22.9794
OD_AL: 23.0257
OS_AL: 22.796
OS_AL: 22.8873

## 2020-12-13 ASSESSMENT — VISUAL ACUITY
OS_BCVA: 20/30+2
OD_BCVA: 20/20-1

## 2020-12-13 ASSESSMENT — SPHEQUIV_DERIVED
OS_SPHEQUIV: -0.375
OD_SPHEQUIV: -0.75
OS_SPHEQUIV: -0.125
OD_SPHEQUIV: -0.625

## 2021-01-04 ENCOUNTER — DOCTOR'S OFFICE (OUTPATIENT)
Dept: URBAN - NONMETROPOLITAN AREA CLINIC 1 | Facility: CLINIC | Age: 81
Setting detail: OPHTHALMOLOGY
End: 2021-01-04
Payer: COMMERCIAL

## 2021-01-04 DIAGNOSIS — H35.371: ICD-10-CM

## 2021-01-04 DIAGNOSIS — H35.81: ICD-10-CM

## 2021-01-04 DIAGNOSIS — H43.812: ICD-10-CM

## 2021-01-04 PROCEDURE — 92201 OPSCPY EXTND RTA DRAW UNI/BI: CPT | Performed by: OPHTHALMOLOGY

## 2021-01-04 PROCEDURE — 92014 COMPRE OPH EXAM EST PT 1/>: CPT | Performed by: OPHTHALMOLOGY

## 2021-01-04 PROCEDURE — 92134 CPTRZ OPH DX IMG PST SGM RTA: CPT | Performed by: OPHTHALMOLOGY

## 2021-01-04 ASSESSMENT — REFRACTION_CURRENTRX
OD_VPRISM_DIRECTION: PROGS
OD_CYLINDER: -0.25
OD_AXIS: 122
OS_CYLINDER: -1.25
OS_AXIS: 088
OS_OVR_VA: 20/
OD_OVR_VA: 20/
OS_SPHERE: +0.50
OS_VPRISM_DIRECTION: PROGS
OD_ADD: +2.50
OS_ADD: +2.50
OD_SPHERE: -0.50

## 2021-01-04 ASSESSMENT — AXIALLENGTH_DERIVED
OD_AL: 22.9794
OD_AL: 23.0257
OS_AL: 22.8873
OS_AL: 22.796

## 2021-01-04 ASSESSMENT — KERATOMETRY
OD_K1POWER_DIOPTERS: 45.75
OS_AXISANGLE_DEGREES: 120
OD_K2POWER_DIOPTERS: 46.00
OS_K1POWER_DIOPTERS: 45.75
OD_AXISANGLE_DEGREES: 011
OS_K2POWER_DIOPTERS: 46.00

## 2021-01-04 ASSESSMENT — SPHEQUIV_DERIVED
OD_SPHEQUIV: -0.75
OS_SPHEQUIV: -0.125
OS_SPHEQUIV: -0.375
OD_SPHEQUIV: -0.625

## 2021-01-04 ASSESSMENT — REFRACTION_AUTOREFRACTION
OS_AXIS: 096
OD_AXIS: 135
OS_SPHERE: +0.25
OS_CYLINDER: -1.25
OD_CYLINDER: -0.50
OD_SPHERE: -0.50

## 2021-01-04 ASSESSMENT — REFRACTION_MANIFEST
OS_VA2: 20/20
OD_SPHERE: -0.50
OS_AXIS: 090
OU_VA: 20/20
OD_ADD: +2.50
OD_AXIS: 115
OD_VA2: 20/20
OS_ADD: +2.50
OS_SPHERE: +0.50
OD_CYLINDER: -0.25
OS_CYLINDER: -1.25
OD_VA1: 20/20-2
OS_VA1: 20/20

## 2021-01-04 ASSESSMENT — LID EXAM ASSESSMENTS
OD_BLEPHARITIS: RLL RUL
OS_BLEPHARITIS: LLL LUL

## 2021-01-04 ASSESSMENT — VISUAL ACUITY
OD_BCVA: 20/20
OS_BCVA: 20/30+2

## 2021-01-04 ASSESSMENT — SUPERFICIAL PUNCTATE KERATITIS (SPK)
OS_SPK: T
OD_SPK: T

## 2021-01-04 ASSESSMENT — CONFRONTATIONAL VISUAL FIELD TEST (CVF)
OS_FINDINGS: FULL
OD_FINDINGS: FULL

## 2021-02-08 ENCOUNTER — DOCTOR'S OFFICE (OUTPATIENT)
Dept: URBAN - NONMETROPOLITAN AREA CLINIC 1 | Facility: CLINIC | Age: 81
Setting detail: OPHTHALMOLOGY
End: 2021-02-08
Payer: COMMERCIAL

## 2021-02-08 DIAGNOSIS — H40.1113: ICD-10-CM

## 2021-02-08 DIAGNOSIS — H40.1121: ICD-10-CM

## 2021-02-08 PROCEDURE — 92014 COMPRE OPH EXAM EST PT 1/>: CPT | Performed by: OPHTHALMOLOGY

## 2021-02-08 PROCEDURE — 76514 ECHO EXAM OF EYE THICKNESS: CPT | Performed by: OPHTHALMOLOGY

## 2021-02-08 PROCEDURE — 92083 EXTENDED VISUAL FIELD XM: CPT | Performed by: OPHTHALMOLOGY

## 2021-02-08 ASSESSMENT — TONOMETRY
OD_IOP_MMHG: 13
OS_IOP_MMHG: 12

## 2021-02-08 ASSESSMENT — SUPERFICIAL PUNCTATE KERATITIS (SPK)
OD_SPK: T
OS_SPK: T

## 2021-02-08 ASSESSMENT — LID EXAM ASSESSMENTS
OD_BLEPHARITIS: RLL RUL
OS_BLEPHARITIS: LLL LUL

## 2021-02-08 ASSESSMENT — PACHYMETRY
OD_CT_UM: 553
OS_CT_UM: 546
OD_CT_CORRECTION: -1

## 2021-02-08 ASSESSMENT — CONFRONTATIONAL VISUAL FIELD TEST (CVF)
OS_FINDINGS: FULL
OD_FINDINGS: FULL

## 2021-02-15 ASSESSMENT — REFRACTION_MANIFEST
OS_VA2: 20/20
OD_CYLINDER: -0.25
OS_AXIS: 090
OD_VA2: 20/20
OD_AXIS: 115
OS_ADD: +2.50
OD_VA1: 20/20-2
OS_CYLINDER: -1.25
OS_SPHERE: +0.50
OS_VA1: 20/20
OD_ADD: +2.50
OD_SPHERE: -0.50
OU_VA: 20/20

## 2021-02-15 ASSESSMENT — VISUAL ACUITY
OS_BCVA: 20/25
OD_BCVA: 20/20

## 2021-02-15 ASSESSMENT — REFRACTION_CURRENTRX
OD_AXIS: 122
OD_CYLINDER: -0.25
OD_ADD: +2.50
OS_OVR_VA: 20/
OS_SPHERE: +0.50
OS_CYLINDER: -1.25
OS_ADD: +2.50
OD_OVR_VA: 20/
OS_VPRISM_DIRECTION: PROGS
OS_AXIS: 088
OD_VPRISM_DIRECTION: PROGS
OD_SPHERE: -0.50

## 2021-02-15 ASSESSMENT — SPHEQUIV_DERIVED
OS_SPHEQUIV: -0.125
OS_SPHEQUIV: -0.625
OD_SPHEQUIV: -0.625
OD_SPHEQUIV: -0.625

## 2021-02-15 ASSESSMENT — REFRACTION_AUTOREFRACTION
OD_CYLINDER: -0.75
OD_SPHERE: -0.25
OS_SPHERE: 0.00
OS_AXIS: 090
OD_AXIS: 095
OS_CYLINDER: -1.25

## 2021-02-15 ASSESSMENT — AXIALLENGTH_DERIVED
OD_AL: 22.9794
OS_AL: 22.796
OS_AL: 22.9794
OD_AL: 22.9794

## 2021-02-15 ASSESSMENT — KERATOMETRY
OS_K1POWER_DIOPTERS: 45.75
OS_K2POWER_DIOPTERS: 46.00
OS_AXISANGLE_DEGREES: 120
OD_K2POWER_DIOPTERS: 46.00
OD_K1POWER_DIOPTERS: 45.75
OD_AXISANGLE_DEGREES: 011

## 2021-03-08 ENCOUNTER — DOCTOR'S OFFICE (OUTPATIENT)
Dept: URBAN - NONMETROPOLITAN AREA CLINIC 1 | Facility: CLINIC | Age: 81
Setting detail: OPHTHALMOLOGY
End: 2021-03-08
Payer: COMMERCIAL

## 2021-03-08 VITALS — HEIGHT: 60 IN

## 2021-03-08 DIAGNOSIS — H04.122: ICD-10-CM

## 2021-03-08 DIAGNOSIS — H04.121: ICD-10-CM

## 2021-03-08 DIAGNOSIS — H43.812: ICD-10-CM

## 2021-03-08 DIAGNOSIS — H35.81: ICD-10-CM

## 2021-03-08 DIAGNOSIS — H35.371: ICD-10-CM

## 2021-03-08 PROCEDURE — 92014 COMPRE OPH EXAM EST PT 1/>: CPT | Performed by: OPHTHALMOLOGY

## 2021-03-08 PROCEDURE — 92201 OPSCPY EXTND RTA DRAW UNI/BI: CPT | Performed by: OPHTHALMOLOGY

## 2021-03-08 PROCEDURE — 92134 CPTRZ OPH DX IMG PST SGM RTA: CPT | Performed by: OPHTHALMOLOGY

## 2021-03-08 ASSESSMENT — REFRACTION_MANIFEST
OD_VA2: 20/20
OS_CYLINDER: -1.25
OS_ADD: +2.50
OS_SPHERE: +0.50
OS_VA1: 20/20
OD_ADD: +2.50
OD_SPHERE: -0.50
OD_CYLINDER: -0.25
OD_VA1: 20/20-2
OS_AXIS: 090
OD_AXIS: 115
OU_VA: 20/20
OS_VA2: 20/20

## 2021-03-08 ASSESSMENT — KERATOMETRY
OS_AXISANGLE_DEGREES: 120
OD_AXISANGLE_DEGREES: 011
OD_K1POWER_DIOPTERS: 45.75
OD_K2POWER_DIOPTERS: 46.00
OS_K2POWER_DIOPTERS: 46.00
OS_K1POWER_DIOPTERS: 45.75

## 2021-03-08 ASSESSMENT — REFRACTION_CURRENTRX
OS_SPHERE: +0.50
OD_OVR_VA: 20/
OS_AXIS: 088
OS_CYLINDER: -1.25
OS_OVR_VA: 20/
OD_SPHERE: -0.50
OD_AXIS: 122
OS_VPRISM_DIRECTION: PROGS
OD_VPRISM_DIRECTION: PROGS
OD_ADD: +2.50
OS_ADD: +2.50
OD_CYLINDER: -0.25

## 2021-03-08 ASSESSMENT — AXIALLENGTH_DERIVED
OD_AL: 22.9794
OS_AL: 22.9794
OS_AL: 22.796
OD_AL: 22.9794

## 2021-03-08 ASSESSMENT — SUPERFICIAL PUNCTATE KERATITIS (SPK)
OD_SPK: T
OS_SPK: T

## 2021-03-08 ASSESSMENT — CONFRONTATIONAL VISUAL FIELD TEST (CVF)
OS_FINDINGS: FULL
OD_FINDINGS: FULL

## 2021-03-08 ASSESSMENT — LID EXAM ASSESSMENTS
OD_BLEPHARITIS: RLL RUL
OS_BLEPHARITIS: LLL LUL

## 2021-03-08 ASSESSMENT — REFRACTION_AUTOREFRACTION
OD_AXIS: 095
OD_CYLINDER: -0.75
OS_SPHERE: 0.00
OD_SPHERE: -0.25
OS_AXIS: 090
OS_CYLINDER: -1.25

## 2021-03-08 ASSESSMENT — VISUAL ACUITY
OS_BCVA: 20/25-1
OD_BCVA: 20/25

## 2021-03-08 ASSESSMENT — SPHEQUIV_DERIVED
OS_SPHEQUIV: -0.125
OD_SPHEQUIV: -0.625
OD_SPHEQUIV: -0.625
OS_SPHEQUIV: -0.625

## 2021-03-18 ENCOUNTER — DOCTOR'S OFFICE (OUTPATIENT)
Dept: URBAN - NONMETROPOLITAN AREA CLINIC 1 | Facility: CLINIC | Age: 81
Setting detail: OPHTHALMOLOGY
End: 2021-03-18
Payer: COMMERCIAL

## 2021-03-18 DIAGNOSIS — H35.371: ICD-10-CM

## 2021-03-18 DIAGNOSIS — H04.122: ICD-10-CM

## 2021-03-18 DIAGNOSIS — H35.81: ICD-10-CM

## 2021-03-18 DIAGNOSIS — H43.812: ICD-10-CM

## 2021-03-18 DIAGNOSIS — H04.121: ICD-10-CM

## 2021-03-18 PROCEDURE — 92014 COMPRE OPH EXAM EST PT 1/>: CPT | Performed by: OPHTHALMOLOGY

## 2021-03-18 PROCEDURE — 92250 FUNDUS PHOTOGRAPHY W/I&R: CPT | Performed by: OPHTHALMOLOGY

## 2021-03-18 PROCEDURE — 92235 FLUORESCEIN ANGRPH MLTIFRAME: CPT | Performed by: OPHTHALMOLOGY

## 2021-03-18 ASSESSMENT — SUPERFICIAL PUNCTATE KERATITIS (SPK)
OS_SPK: T
OD_SPK: T

## 2021-03-18 ASSESSMENT — REFRACTION_CURRENTRX
OD_OVR_VA: 20/
OD_CYLINDER: -0.25
OS_VPRISM_DIRECTION: PROGS
OS_CYLINDER: -1.25
OS_ADD: +2.50
OS_AXIS: 088
OD_AXIS: 122
OS_OVR_VA: 20/
OS_SPHERE: +0.50
OD_ADD: +2.50
OD_SPHERE: -0.50
OD_VPRISM_DIRECTION: PROGS

## 2021-03-18 ASSESSMENT — KERATOMETRY
OD_K2POWER_DIOPTERS: 46.00
OS_K2POWER_DIOPTERS: 46.00
OS_K1POWER_DIOPTERS: 45.75
OD_K1POWER_DIOPTERS: 45.75
OD_AXISANGLE_DEGREES: 011
OS_AXISANGLE_DEGREES: 120

## 2021-03-18 ASSESSMENT — REFRACTION_MANIFEST
OS_SPHERE: +0.50
OD_VA2: 20/20
OS_CYLINDER: -1.25
OD_AXIS: 115
OD_ADD: +2.50
OS_AXIS: 090
OD_SPHERE: -0.50
OD_CYLINDER: -0.25
OS_VA1: 20/20
OU_VA: 20/20
OD_VA1: 20/20-2
OS_ADD: +2.50
OS_VA2: 20/20

## 2021-03-18 ASSESSMENT — SPHEQUIV_DERIVED
OS_SPHEQUIV: -0.125
OD_SPHEQUIV: -0.625
OD_SPHEQUIV: -0.625
OS_SPHEQUIV: -0.625

## 2021-03-18 ASSESSMENT — AXIALLENGTH_DERIVED
OD_AL: 22.9794
OD_AL: 22.9794
OS_AL: 22.796
OS_AL: 22.9794

## 2021-03-18 ASSESSMENT — REFRACTION_AUTOREFRACTION
OS_AXIS: 090
OD_SPHERE: -0.25
OD_AXIS: 095
OD_CYLINDER: -0.75
OS_CYLINDER: -1.25
OS_SPHERE: 0.00

## 2021-03-18 ASSESSMENT — VISUAL ACUITY
OD_BCVA: 20/15-2
OS_BCVA: 20/20-2

## 2021-03-18 ASSESSMENT — LID EXAM ASSESSMENTS
OS_BLEPHARITIS: LLL LUL
OD_BLEPHARITIS: RLL RUL

## 2021-03-18 ASSESSMENT — CONFRONTATIONAL VISUAL FIELD TEST (CVF)
OD_FINDINGS: FULL
OS_FINDINGS: FULL

## 2021-04-19 ENCOUNTER — DOCTOR'S OFFICE (OUTPATIENT)
Dept: URBAN - NONMETROPOLITAN AREA CLINIC 1 | Facility: CLINIC | Age: 81
Setting detail: OPHTHALMOLOGY
End: 2021-04-19
Payer: COMMERCIAL

## 2021-04-19 VITALS — HEIGHT: 60 IN

## 2021-04-19 DIAGNOSIS — H04.122: ICD-10-CM

## 2021-04-19 DIAGNOSIS — H35.81: ICD-10-CM

## 2021-04-19 DIAGNOSIS — H04.121: ICD-10-CM

## 2021-04-19 DIAGNOSIS — H43.812: ICD-10-CM

## 2021-04-19 DIAGNOSIS — H35.371: ICD-10-CM

## 2021-04-19 PROCEDURE — 92134 CPTRZ OPH DX IMG PST SGM RTA: CPT | Performed by: OPHTHALMOLOGY

## 2021-04-19 PROCEDURE — 92014 COMPRE OPH EXAM EST PT 1/>: CPT | Performed by: OPHTHALMOLOGY

## 2021-04-19 PROCEDURE — 92201 OPSCPY EXTND RTA DRAW UNI/BI: CPT | Performed by: OPHTHALMOLOGY

## 2021-04-19 ASSESSMENT — AXIALLENGTH_DERIVED
OD_AL: 22.9794
OD_AL: 22.9794
OS_AL: 22.9794
OS_AL: 22.796

## 2021-04-19 ASSESSMENT — SUPERFICIAL PUNCTATE KERATITIS (SPK)
OS_SPK: T
OD_SPK: T

## 2021-04-19 ASSESSMENT — REFRACTION_MANIFEST
OS_AXIS: 090
OS_CYLINDER: -1.25
OS_SPHERE: +0.50
OD_ADD: +2.50
OS_VA1: 20/20
OD_SPHERE: -0.50
OS_VA2: 20/20
OD_VA2: 20/20
OU_VA: 20/20
OD_AXIS: 115
OS_ADD: +2.50
OD_CYLINDER: -0.25
OD_VA1: 20/20-2

## 2021-04-19 ASSESSMENT — SPHEQUIV_DERIVED
OS_SPHEQUIV: -0.625
OD_SPHEQUIV: -0.625
OD_SPHEQUIV: -0.625
OS_SPHEQUIV: -0.125

## 2021-04-19 ASSESSMENT — REFRACTION_AUTOREFRACTION
OD_CYLINDER: -0.75
OD_AXIS: 095
OD_SPHERE: -0.25
OS_CYLINDER: -1.25
OS_SPHERE: 0.00
OS_AXIS: 090

## 2021-04-19 ASSESSMENT — KERATOMETRY
OS_K1POWER_DIOPTERS: 45.75
OD_K1POWER_DIOPTERS: 45.75
OD_K2POWER_DIOPTERS: 46.00
OS_K2POWER_DIOPTERS: 46.00
OS_AXISANGLE_DEGREES: 120
OD_AXISANGLE_DEGREES: 011

## 2021-04-19 ASSESSMENT — VISUAL ACUITY
OS_BCVA: 20/25-2
OD_BCVA: 20/20

## 2021-04-19 ASSESSMENT — REFRACTION_CURRENTRX
OD_VPRISM_DIRECTION: PROGS
OD_CYLINDER: -0.25
OD_OVR_VA: 20/
OS_CYLINDER: -1.25
OS_SPHERE: +0.50
OD_ADD: +2.50
OS_ADD: +2.50
OS_AXIS: 088
OS_OVR_VA: 20/
OD_SPHERE: -0.50
OD_AXIS: 122
OS_VPRISM_DIRECTION: PROGS

## 2021-04-19 ASSESSMENT — CONFRONTATIONAL VISUAL FIELD TEST (CVF)
OS_FINDINGS: FULL
OD_FINDINGS: FULL

## 2021-04-19 ASSESSMENT — LID EXAM ASSESSMENTS
OD_BLEPHARITIS: RLL RUL
OS_BLEPHARITIS: LLL LUL

## 2021-05-10 ENCOUNTER — DOCTOR'S OFFICE (OUTPATIENT)
Dept: URBAN - NONMETROPOLITAN AREA CLINIC 1 | Facility: CLINIC | Age: 81
Setting detail: OPHTHALMOLOGY
End: 2021-05-10
Payer: COMMERCIAL

## 2021-05-10 DIAGNOSIS — H40.1121: ICD-10-CM

## 2021-05-10 DIAGNOSIS — H40.1113: ICD-10-CM

## 2021-05-10 DIAGNOSIS — H04.122: ICD-10-CM

## 2021-05-10 DIAGNOSIS — H04.121: ICD-10-CM

## 2021-05-10 PROCEDURE — 92014 COMPRE OPH EXAM EST PT 1/>: CPT | Performed by: OPHTHALMOLOGY

## 2021-05-10 ASSESSMENT — SUPERFICIAL PUNCTATE KERATITIS (SPK)
OS_SPK: T
OD_SPK: T

## 2021-05-10 ASSESSMENT — CONFRONTATIONAL VISUAL FIELD TEST (CVF)
OD_FINDINGS: FULL
OS_FINDINGS: FULL

## 2021-05-10 ASSESSMENT — TONOMETRY
OD_IOP_MMHG: 14
OS_IOP_MMHG: 13

## 2021-05-10 ASSESSMENT — LID EXAM ASSESSMENTS
OS_BLEPHARITIS: LLL LUL
OD_BLEPHARITIS: RLL RUL

## 2021-05-10 ASSESSMENT — PACHYMETRY
OD_CT_CORRECTION: -1
OD_CT_UM: 553

## 2021-05-17 ENCOUNTER — DOCTOR'S OFFICE (OUTPATIENT)
Dept: URBAN - NONMETROPOLITAN AREA CLINIC 1 | Facility: CLINIC | Age: 81
Setting detail: OPHTHALMOLOGY
End: 2021-05-17
Payer: COMMERCIAL

## 2021-05-17 DIAGNOSIS — H43.812: ICD-10-CM

## 2021-05-17 DIAGNOSIS — H35.371: ICD-10-CM

## 2021-05-17 DIAGNOSIS — H35.81: ICD-10-CM

## 2021-05-17 DIAGNOSIS — H04.121: ICD-10-CM

## 2021-05-17 DIAGNOSIS — H04.122: ICD-10-CM

## 2021-05-17 PROCEDURE — 92134 CPTRZ OPH DX IMG PST SGM RTA: CPT | Performed by: OPHTHALMOLOGY

## 2021-05-17 PROCEDURE — 92014 COMPRE OPH EXAM EST PT 1/>: CPT | Performed by: OPHTHALMOLOGY

## 2021-05-17 PROCEDURE — 92201 OPSCPY EXTND RTA DRAW UNI/BI: CPT | Performed by: OPHTHALMOLOGY

## 2021-05-17 PROCEDURE — 83861 MICROFLUID ANALY TEARS: CPT | Performed by: OPHTHALMOLOGY

## 2021-05-17 ASSESSMENT — REFRACTION_AUTOREFRACTION
OS_SPHERE: 0.00
OD_AXIS: 095
OS_AXIS: 090
OS_SPHERE: 0.00
OD_CYLINDER: -0.75
OD_SPHERE: -0.25
OS_CYLINDER: -1.25
OD_CYLINDER: -0.75
OS_AXIS: 090
OS_CYLINDER: -1.25
OD_AXIS: 095
OD_SPHERE: -0.25

## 2021-05-17 ASSESSMENT — SPHEQUIV_DERIVED
OS_SPHEQUIV: -0.625
OS_SPHEQUIV: -0.125
OD_SPHEQUIV: -0.625
OS_SPHEQUIV: -0.125
OD_SPHEQUIV: -0.625
OS_SPHEQUIV: -0.625
OD_SPHEQUIV: -0.625
OD_SPHEQUIV: -0.625

## 2021-05-17 ASSESSMENT — VISUAL ACUITY
OS_BCVA: 20/25-2
OS_BCVA: 20/20-1
OD_BCVA: 20/25+1
OD_BCVA: 20/20

## 2021-05-17 ASSESSMENT — CONFRONTATIONAL VISUAL FIELD TEST (CVF)
OD_FINDINGS: FULL
OS_FINDINGS: FULL

## 2021-05-17 ASSESSMENT — AXIALLENGTH_DERIVED
OD_AL: 22.9794
OS_AL: 22.796
OS_AL: 22.9794
OD_AL: 22.9794
OD_AL: 22.9794
OS_AL: 22.9794
OD_AL: 22.9794
OS_AL: 22.796

## 2021-05-17 ASSESSMENT — REFRACTION_CURRENTRX
OS_CYLINDER: -1.25
OD_SPHERE: -0.50
OD_AXIS: 122
OD_VPRISM_DIRECTION: PROGS
OD_AXIS: 122
OS_OVR_VA: 20/
OD_ADD: +2.50
OD_ADD: +2.50
OD_OVR_VA: 20/
OD_CYLINDER: -0.25
OD_CYLINDER: -0.25
OS_VPRISM_DIRECTION: PROGS
OD_VPRISM_DIRECTION: PROGS
OS_ADD: +2.50
OS_CYLINDER: -1.25
OS_SPHERE: +0.50
OS_VPRISM_DIRECTION: PROGS
OS_ADD: +2.50
OS_AXIS: 088
OS_AXIS: 088
OS_SPHERE: +0.50
OS_OVR_VA: 20/
OD_SPHERE: -0.50
OD_OVR_VA: 20/

## 2021-05-17 ASSESSMENT — KERATOMETRY
OD_K1POWER_DIOPTERS: 45.75
OS_K1POWER_DIOPTERS: 45.75
OD_K2POWER_DIOPTERS: 46.00
OD_AXISANGLE_DEGREES: 011
OD_AXISANGLE_DEGREES: 011
OD_K1POWER_DIOPTERS: 45.75
OS_K2POWER_DIOPTERS: 46.00
OS_AXISANGLE_DEGREES: 120
OS_AXISANGLE_DEGREES: 120
OD_K2POWER_DIOPTERS: 46.00
OS_K2POWER_DIOPTERS: 46.00
OS_K1POWER_DIOPTERS: 45.75

## 2021-05-17 ASSESSMENT — REFRACTION_MANIFEST
OD_ADD: +2.50
OS_VA2: 20/20
OS_ADD: +2.50
OS_VA2: 20/20
OD_CYLINDER: -0.25
OD_SPHERE: -0.50
OD_VA1: 20/20-2
OD_VA2: 20/20
OS_VA1: 20/20
OS_CYLINDER: -1.25
OS_SPHERE: +0.50
OD_VA1: 20/20-2
OU_VA: 20/20
OD_AXIS: 115
OS_SPHERE: +0.50
OS_ADD: +2.50
OS_CYLINDER: -1.25
OD_SPHERE: -0.50
OS_AXIS: 090
OD_CYLINDER: -0.25
OD_VA2: 20/20
OU_VA: 20/20
OD_ADD: +2.50
OS_AXIS: 090
OS_VA1: 20/20
OD_AXIS: 115

## 2021-05-17 ASSESSMENT — SUPERFICIAL PUNCTATE KERATITIS (SPK)
OS_SPK: T
OD_SPK: T

## 2021-05-17 ASSESSMENT — LID EXAM ASSESSMENTS
OD_BLEPHARITIS: RLL RUL
OS_BLEPHARITIS: LLL LUL

## 2021-06-14 ENCOUNTER — RX ONLY (RX ONLY)
Age: 81
End: 2021-06-14

## 2021-06-14 ENCOUNTER — DOCTOR'S OFFICE (OUTPATIENT)
Dept: URBAN - NONMETROPOLITAN AREA CLINIC 1 | Facility: CLINIC | Age: 81
Setting detail: OPHTHALMOLOGY
End: 2021-06-14
Payer: COMMERCIAL

## 2021-06-14 VITALS — HEIGHT: 60 IN

## 2021-06-14 DIAGNOSIS — H43.812: ICD-10-CM

## 2021-06-14 DIAGNOSIS — H04.122: ICD-10-CM

## 2021-06-14 DIAGNOSIS — H04.121: ICD-10-CM

## 2021-06-14 DIAGNOSIS — H35.81: ICD-10-CM

## 2021-06-14 DIAGNOSIS — H35.371: ICD-10-CM

## 2021-06-14 PROCEDURE — 92201 OPSCPY EXTND RTA DRAW UNI/BI: CPT | Performed by: OPHTHALMOLOGY

## 2021-06-14 PROCEDURE — 92014 COMPRE OPH EXAM EST PT 1/>: CPT | Performed by: OPHTHALMOLOGY

## 2021-06-14 PROCEDURE — 92134 CPTRZ OPH DX IMG PST SGM RTA: CPT | Performed by: OPHTHALMOLOGY

## 2021-06-14 ASSESSMENT — CONFRONTATIONAL VISUAL FIELD TEST (CVF)
OS_FINDINGS: FULL
OD_FINDINGS: FULL

## 2021-06-14 ASSESSMENT — SPHEQUIV_DERIVED
OD_SPHEQUIV: -0.625
OS_SPHEQUIV: -0.625
OS_SPHEQUIV: -0.125
OD_SPHEQUIV: -0.625

## 2021-06-14 ASSESSMENT — KERATOMETRY
OS_AXISANGLE_DEGREES: 120
OD_K1POWER_DIOPTERS: 45.75
OS_K1POWER_DIOPTERS: 45.75
OD_AXISANGLE_DEGREES: 011
OD_K2POWER_DIOPTERS: 46.00
OS_K2POWER_DIOPTERS: 46.00

## 2021-06-14 ASSESSMENT — REFRACTION_AUTOREFRACTION
OD_CYLINDER: -0.75
OD_AXIS: 095
OD_SPHERE: -0.25
OS_SPHERE: 0.00
OS_CYLINDER: -1.25
OS_AXIS: 090

## 2021-06-14 ASSESSMENT — REFRACTION_MANIFEST
OS_ADD: +2.50
OS_VA1: 20/20
OD_AXIS: 115
OD_SPHERE: -0.50
OD_ADD: +2.50
OD_VA1: 20/20-2
OD_VA2: 20/20
OS_CYLINDER: -1.25
OS_AXIS: 090
OS_VA2: 20/20
OU_VA: 20/20
OD_CYLINDER: -0.25
OS_SPHERE: +0.50

## 2021-06-14 ASSESSMENT — REFRACTION_CURRENTRX
OD_VPRISM_DIRECTION: PROGS
OS_VPRISM_DIRECTION: PROGS
OS_AXIS: 088
OD_OVR_VA: 20/
OD_CYLINDER: -0.25
OD_AXIS: 122
OD_SPHERE: -0.50
OS_OVR_VA: 20/
OS_CYLINDER: -1.25
OD_ADD: +2.50
OS_ADD: +2.50
OS_SPHERE: +0.50

## 2021-06-14 ASSESSMENT — AXIALLENGTH_DERIVED
OS_AL: 22.9794
OD_AL: 22.9794
OD_AL: 22.9794
OS_AL: 22.796

## 2021-06-14 ASSESSMENT — TONOMETRY
OD_IOP_MMHG: 16
OS_IOP_MMHG: 13

## 2021-06-14 ASSESSMENT — LID EXAM ASSESSMENTS
OD_BLEPHARITIS: RLL RUL
OS_BLEPHARITIS: LLL LUL

## 2021-06-14 ASSESSMENT — SUPERFICIAL PUNCTATE KERATITIS (SPK)
OS_SPK: T
OD_SPK: T

## 2021-06-14 ASSESSMENT — VISUAL ACUITY
OD_BCVA: 20/20
OS_BCVA: 20/25

## 2021-06-14 ASSESSMENT — PACHYMETRY
OD_CT_UM: 553
OD_CT_CORRECTION: -1

## 2021-06-28 ENCOUNTER — DOCTOR'S OFFICE (OUTPATIENT)
Dept: URBAN - NONMETROPOLITAN AREA CLINIC 1 | Facility: CLINIC | Age: 81
Setting detail: OPHTHALMOLOGY
End: 2021-06-28
Payer: COMMERCIAL

## 2021-06-28 VITALS — HEIGHT: 60 IN

## 2021-06-28 DIAGNOSIS — H04.121: ICD-10-CM

## 2021-06-28 DIAGNOSIS — H04.122: ICD-10-CM

## 2021-06-28 DIAGNOSIS — H35.371: ICD-10-CM

## 2021-06-28 DIAGNOSIS — H35.81: ICD-10-CM

## 2021-06-28 DIAGNOSIS — A69.20: ICD-10-CM

## 2021-06-28 PROCEDURE — 92235 FLUORESCEIN ANGRPH MLTIFRAME: CPT | Performed by: OPHTHALMOLOGY

## 2021-06-28 PROCEDURE — 92014 COMPRE OPH EXAM EST PT 1/>: CPT | Performed by: OPHTHALMOLOGY

## 2021-06-28 PROCEDURE — 92250 FUNDUS PHOTOGRAPHY W/I&R: CPT | Performed by: OPHTHALMOLOGY

## 2021-06-28 ASSESSMENT — REFRACTION_MANIFEST
OS_ADD: +2.50
OD_SPHERE: -0.50
OD_ADD: +2.50
OU_VA: 20/20
OD_VA2: 20/20
OD_CYLINDER: -0.25
OS_VA2: 20/20
OS_VA1: 20/20
OD_VA1: 20/20-2
OD_AXIS: 115
OS_AXIS: 090
OS_SPHERE: +0.50
OS_CYLINDER: -1.25

## 2021-06-28 ASSESSMENT — LID EXAM ASSESSMENTS
OD_BLEPHARITIS: RLL RUL
OS_BLEPHARITIS: LLL LUL

## 2021-06-28 ASSESSMENT — VISUAL ACUITY
OS_BCVA: 20/30-1
OD_BCVA: 20/30-2

## 2021-06-28 ASSESSMENT — CONFRONTATIONAL VISUAL FIELD TEST (CVF)
OS_FINDINGS: FULL
OD_FINDINGS: FULL

## 2021-06-28 ASSESSMENT — SUPERFICIAL PUNCTATE KERATITIS (SPK)
OD_SPK: T
OS_SPK: T

## 2021-06-28 ASSESSMENT — KERATOMETRY
OD_K1POWER_DIOPTERS: 45.75
OD_AXISANGLE_DEGREES: 011
OS_AXISANGLE_DEGREES: 120
OS_K2POWER_DIOPTERS: 46.00
OS_K1POWER_DIOPTERS: 45.75
OD_K2POWER_DIOPTERS: 46.00

## 2021-06-28 ASSESSMENT — REFRACTION_CURRENTRX
OD_ADD: +2.50
OD_OVR_VA: 20/
OS_OVR_VA: 20/
OS_VPRISM_DIRECTION: PROGS
OS_ADD: +2.50
OD_VPRISM_DIRECTION: PROGS
OD_AXIS: 122
OS_CYLINDER: -1.25
OD_SPHERE: -0.50
OD_CYLINDER: -0.25
OS_AXIS: 088
OS_SPHERE: +0.50

## 2021-06-28 ASSESSMENT — SPHEQUIV_DERIVED
OS_SPHEQUIV: -0.125
OD_SPHEQUIV: -0.625
OS_SPHEQUIV: -0.625
OD_SPHEQUIV: -0.625

## 2021-06-28 ASSESSMENT — AXIALLENGTH_DERIVED
OS_AL: 22.9794
OD_AL: 22.9794
OS_AL: 22.796
OD_AL: 22.9794

## 2021-06-28 ASSESSMENT — REFRACTION_AUTOREFRACTION
OS_CYLINDER: -1.25
OS_AXIS: 090
OD_SPHERE: -0.25
OD_AXIS: 095
OS_SPHERE: 0.00
OD_CYLINDER: -0.75

## 2021-07-12 ENCOUNTER — DOCTOR'S OFFICE (OUTPATIENT)
Dept: URBAN - NONMETROPOLITAN AREA CLINIC 1 | Facility: CLINIC | Age: 81
Setting detail: OPHTHALMOLOGY
End: 2021-07-12
Payer: COMMERCIAL

## 2021-07-12 DIAGNOSIS — H40.1113: ICD-10-CM

## 2021-07-12 DIAGNOSIS — H40.1121: ICD-10-CM

## 2021-07-12 PROCEDURE — 92083 EXTENDED VISUAL FIELD XM: CPT | Performed by: OPHTHALMOLOGY

## 2021-07-26 ENCOUNTER — DOCTOR'S OFFICE (OUTPATIENT)
Dept: URBAN - NONMETROPOLITAN AREA CLINIC 1 | Facility: CLINIC | Age: 81
Setting detail: OPHTHALMOLOGY
End: 2021-07-26
Payer: COMMERCIAL

## 2021-07-26 DIAGNOSIS — H04.122: ICD-10-CM

## 2021-07-26 DIAGNOSIS — H35.371: ICD-10-CM

## 2021-07-26 DIAGNOSIS — H40.1121: ICD-10-CM

## 2021-07-26 DIAGNOSIS — H04.121: ICD-10-CM

## 2021-07-26 DIAGNOSIS — A69.20: ICD-10-CM

## 2021-07-26 DIAGNOSIS — H40.1113: ICD-10-CM

## 2021-07-26 DIAGNOSIS — H43.812: ICD-10-CM

## 2021-07-26 DIAGNOSIS — H35.81: ICD-10-CM

## 2021-07-26 PROCEDURE — 92134 CPTRZ OPH DX IMG PST SGM RTA: CPT | Performed by: OPHTHALMOLOGY

## 2021-07-26 PROCEDURE — 92014 COMPRE OPH EXAM EST PT 1/>: CPT | Performed by: OPHTHALMOLOGY

## 2021-07-26 ASSESSMENT — REFRACTION_CURRENTRX
OS_VPRISM_DIRECTION: PROGS
OS_CYLINDER: -1.25
OS_ADD: +2.50
OD_VPRISM_DIRECTION: PROGS
OD_SPHERE: -0.50
OD_CYLINDER: -0.25
OD_ADD: +2.50
OD_OVR_VA: 20/
OD_AXIS: 122
OS_AXIS: 088
OS_OVR_VA: 20/
OS_SPHERE: +0.50

## 2021-07-26 ASSESSMENT — REFRACTION_MANIFEST
OD_AXIS: 115
OD_VA2: 20/20
OD_VA1: 20/20-2
OS_AXIS: 090
OS_ADD: +2.50
OU_VA: 20/20
OS_SPHERE: +0.50
OD_SPHERE: -0.50
OS_CYLINDER: -1.25
OS_VA2: 20/20
OD_ADD: +2.50
OD_CYLINDER: -0.25
OS_VA1: 20/20

## 2021-07-26 ASSESSMENT — KERATOMETRY
OS_K1POWER_DIOPTERS: 45.75
OD_K1POWER_DIOPTERS: 45.75
OD_K2POWER_DIOPTERS: 46.00
OS_AXISANGLE_DEGREES: 120
OS_K2POWER_DIOPTERS: 46.00
OD_AXISANGLE_DEGREES: 011

## 2021-07-26 ASSESSMENT — VISUAL ACUITY
OD_BCVA: 20/20-1
OS_BCVA: 20/30-1

## 2021-07-26 ASSESSMENT — AXIALLENGTH_DERIVED
OS_AL: 22.9794
OS_AL: 22.796
OD_AL: 22.9794
OD_AL: 22.9794

## 2021-07-26 ASSESSMENT — CONFRONTATIONAL VISUAL FIELD TEST (CVF)
OS_FINDINGS: FULL
OD_FINDINGS: FULL

## 2021-07-26 ASSESSMENT — REFRACTION_AUTOREFRACTION
OD_AXIS: 095
OS_AXIS: 090
OS_CYLINDER: -1.25
OD_SPHERE: -0.25
OD_CYLINDER: -0.75
OS_SPHERE: 0.00

## 2021-07-26 ASSESSMENT — SUPERFICIAL PUNCTATE KERATITIS (SPK)
OD_SPK: T
OS_SPK: T

## 2021-07-26 ASSESSMENT — SPHEQUIV_DERIVED
OD_SPHEQUIV: -0.625
OD_SPHEQUIV: -0.625
OS_SPHEQUIV: -0.125
OS_SPHEQUIV: -0.625

## 2021-07-26 ASSESSMENT — LID EXAM ASSESSMENTS
OD_BLEPHARITIS: RLL RUL
OS_BLEPHARITIS: LLL LUL

## 2021-08-23 ENCOUNTER — RX ONLY (RX ONLY)
Age: 81
End: 2021-08-23

## 2021-08-23 ENCOUNTER — DOCTOR'S OFFICE (OUTPATIENT)
Dept: URBAN - NONMETROPOLITAN AREA CLINIC 1 | Facility: CLINIC | Age: 81
Setting detail: OPHTHALMOLOGY
End: 2021-08-23
Payer: COMMERCIAL

## 2021-08-23 DIAGNOSIS — H04.122: ICD-10-CM

## 2021-08-23 DIAGNOSIS — H40.1113: ICD-10-CM

## 2021-08-23 DIAGNOSIS — H40.1121: ICD-10-CM

## 2021-08-23 DIAGNOSIS — H04.121: ICD-10-CM

## 2021-08-23 PROCEDURE — 92014 COMPRE OPH EXAM EST PT 1/>: CPT | Performed by: OPHTHALMOLOGY

## 2021-08-23 PROCEDURE — 92133 CPTRZD OPH DX IMG PST SGM ON: CPT | Performed by: OPHTHALMOLOGY

## 2021-08-23 RX ORDER — PREDNISOLONE ACETATE 10 MG/ML
SUSPENSION/ DROPS OPHTHALMIC
Qty: 1 | Refills: 3 | Status: ACTIVE | OUTPATIENT

## 2021-08-23 ASSESSMENT — LID EXAM ASSESSMENTS
OD_BLEPHARITIS: RLL RUL
OS_BLEPHARITIS: LLL LUL

## 2021-08-23 ASSESSMENT — PACHYMETRY
OD_CT_CORRECTION: -1
OD_CT_UM: 553

## 2021-08-23 ASSESSMENT — CONFRONTATIONAL VISUAL FIELD TEST (CVF)
OD_FINDINGS: FULL
OS_FINDINGS: FULL

## 2021-08-23 ASSESSMENT — TONOMETRY
OS_IOP_MMHG: 12
OD_IOP_MMHG: 15

## 2021-08-25 ASSESSMENT — REFRACTION_MANIFEST
OS_SPHERE: +0.50
OD_VA2: 20/20
OD_SPHERE: -0.50
OU_VA: 20/20
OS_VA2: 20/20
OS_ADD: +2.50
OD_CYLINDER: -0.25
OS_VA1: 20/20
OS_AXIS: 090
OD_ADD: +2.50
OD_AXIS: 115
OD_VA1: 20/20-2
OS_CYLINDER: -1.25

## 2021-08-25 ASSESSMENT — KERATOMETRY
OD_AXISANGLE_DEGREES: 011
OD_K2POWER_DIOPTERS: 46.00
OS_K1POWER_DIOPTERS: 45.75
OS_K2POWER_DIOPTERS: 46.00
OD_K1POWER_DIOPTERS: 45.75
OS_AXISANGLE_DEGREES: 120

## 2021-08-25 ASSESSMENT — AXIALLENGTH_DERIVED
OS_AL: 22.9794
OD_AL: 22.9794
OS_AL: 22.796
OD_AL: 22.9794

## 2021-08-25 ASSESSMENT — REFRACTION_AUTOREFRACTION
OS_SPHERE: 0.00
OS_AXIS: 090
OD_CYLINDER: -0.75
OS_CYLINDER: -1.25
OD_SPHERE: -0.25
OD_AXIS: 095

## 2021-08-25 ASSESSMENT — REFRACTION_CURRENTRX
OD_ADD: +2.50
OD_OVR_VA: 20/
OS_OVR_VA: 20/
OS_SPHERE: +0.50
OD_CYLINDER: -0.25
OS_CYLINDER: -1.25
OD_VPRISM_DIRECTION: PROGS
OD_AXIS: 124
OS_AXIS: 087
OS_VPRISM_DIRECTION: PROGS
OS_ADD: +2.50
OD_SPHERE: -0.50

## 2021-08-25 ASSESSMENT — VISUAL ACUITY
OD_BCVA: 20/25+1
OS_BCVA: 20/25-1

## 2021-08-25 ASSESSMENT — SPHEQUIV_DERIVED
OD_SPHEQUIV: -0.625
OS_SPHEQUIV: -0.125
OS_SPHEQUIV: -0.625
OD_SPHEQUIV: -0.625

## 2021-08-30 ENCOUNTER — DOCTOR'S OFFICE (OUTPATIENT)
Dept: URBAN - NONMETROPOLITAN AREA CLINIC 1 | Facility: CLINIC | Age: 81
Setting detail: OPHTHALMOLOGY
End: 2021-08-30
Payer: COMMERCIAL

## 2021-08-30 DIAGNOSIS — H35.81: ICD-10-CM

## 2021-08-30 DIAGNOSIS — H43.812: ICD-10-CM

## 2021-08-30 DIAGNOSIS — H35.371: ICD-10-CM

## 2021-08-30 PROCEDURE — 92201 OPSCPY EXTND RTA DRAW UNI/BI: CPT | Performed by: OPHTHALMOLOGY

## 2021-08-30 PROCEDURE — 92134 CPTRZ OPH DX IMG PST SGM RTA: CPT | Performed by: OPHTHALMOLOGY

## 2021-08-30 PROCEDURE — 92014 COMPRE OPH EXAM EST PT 1/>: CPT | Performed by: OPHTHALMOLOGY

## 2021-08-30 ASSESSMENT — REFRACTION_CURRENTRX
OD_SPHERE: -0.50
OD_AXIS: 124
OD_VPRISM_DIRECTION: PROGS
OS_AXIS: 087
OD_CYLINDER: -0.25
OS_CYLINDER: -1.25
OS_OVR_VA: 20/
OD_OVR_VA: 20/
OD_ADD: +2.50
OS_ADD: +2.50
OS_VPRISM_DIRECTION: PROGS
OS_SPHERE: +0.50

## 2021-08-30 ASSESSMENT — REFRACTION_AUTOREFRACTION
OD_CYLINDER: -0.75
OD_SPHERE: -0.25
OS_SPHERE: 0.00
OS_CYLINDER: -1.25
OD_AXIS: 095
OS_AXIS: 090

## 2021-08-30 ASSESSMENT — SPHEQUIV_DERIVED
OD_SPHEQUIV: -0.625
OD_SPHEQUIV: -0.625
OS_SPHEQUIV: -0.125
OS_SPHEQUIV: -0.625

## 2021-08-30 ASSESSMENT — REFRACTION_MANIFEST
OU_VA: 20/20
OD_SPHERE: -0.50
OS_AXIS: 090
OS_SPHERE: +0.50
OS_VA1: 20/20
OD_VA1: 20/20-2
OD_VA2: 20/20
OS_ADD: +2.50
OD_ADD: +2.50
OD_AXIS: 115
OS_VA2: 20/20
OD_CYLINDER: -0.25
OS_CYLINDER: -1.25

## 2021-08-30 ASSESSMENT — LID EXAM ASSESSMENTS
OD_BLEPHARITIS: RLL RUL
OS_BLEPHARITIS: LLL LUL

## 2021-08-30 ASSESSMENT — KERATOMETRY
OS_AXISANGLE_DEGREES: 120
OS_K2POWER_DIOPTERS: 46.00
OD_K1POWER_DIOPTERS: 45.75
OD_AXISANGLE_DEGREES: 011
OS_K1POWER_DIOPTERS: 45.75
OD_K2POWER_DIOPTERS: 46.00

## 2021-08-30 ASSESSMENT — CONFRONTATIONAL VISUAL FIELD TEST (CVF)
OD_FINDINGS: FULL
OS_FINDINGS: FULL

## 2021-08-30 ASSESSMENT — AXIALLENGTH_DERIVED
OD_AL: 22.9794
OS_AL: 22.9794
OD_AL: 22.9794
OS_AL: 22.796

## 2021-08-30 ASSESSMENT — VISUAL ACUITY
OD_BCVA: 20/25+1
OS_BCVA: 20/40-2

## 2021-09-03 ENCOUNTER — DOCTOR'S OFFICE (OUTPATIENT)
Dept: URBAN - NONMETROPOLITAN AREA CLINIC 1 | Facility: CLINIC | Age: 81
Setting detail: OPHTHALMOLOGY
End: 2021-09-03
Payer: COMMERCIAL

## 2021-09-03 DIAGNOSIS — H53.121: ICD-10-CM

## 2021-09-03 DIAGNOSIS — H40.1113: ICD-10-CM

## 2021-09-03 DIAGNOSIS — H40.1121: ICD-10-CM

## 2021-09-03 PROCEDURE — 92012 INTRM OPH EXAM EST PATIENT: CPT | Performed by: OPHTHALMOLOGY

## 2021-09-03 ASSESSMENT — REFRACTION_MANIFEST
OU_VA: 20/20
OS_AXIS: 090
OS_SPHERE: +0.50
OD_VA1: 20/20-2
OS_CYLINDER: -1.25
OD_AXIS: 115
OD_VA2: 20/20
OD_SPHERE: -0.50
OD_CYLINDER: -0.25
OS_VA2: 20/20
OS_VA1: 20/20
OS_ADD: +2.50
OD_ADD: +2.50

## 2021-09-03 ASSESSMENT — CONFRONTATIONAL VISUAL FIELD TEST (CVF)
OD_FINDINGS: FULL
OS_FINDINGS: FULL

## 2021-09-03 ASSESSMENT — REFRACTION_AUTOREFRACTION
OD_CYLINDER: -0.75
OD_SPHERE: -0.25
OD_AXIS: 095
OS_SPHERE: 0.00
OS_AXIS: 090
OS_CYLINDER: -1.25

## 2021-09-03 ASSESSMENT — REFRACTION_CURRENTRX
OD_OVR_VA: 20/
OD_VPRISM_DIRECTION: PROGS
OD_SPHERE: -0.50
OD_ADD: +2.50
OD_CYLINDER: -0.25
OS_ADD: +2.50
OD_AXIS: 124
OS_OVR_VA: 20/
OS_CYLINDER: -1.25
OS_VPRISM_DIRECTION: PROGS
OS_SPHERE: +0.50
OS_AXIS: 087

## 2021-09-03 ASSESSMENT — SPHEQUIV_DERIVED
OD_SPHEQUIV: -0.625
OD_SPHEQUIV: -0.625
OS_SPHEQUIV: -0.625
OS_SPHEQUIV: -0.125

## 2021-09-03 ASSESSMENT — KERATOMETRY
OD_AXISANGLE_DEGREES: 011
OD_K1POWER_DIOPTERS: 45.75
OS_AXISANGLE_DEGREES: 120
OS_K2POWER_DIOPTERS: 46.00
OS_K1POWER_DIOPTERS: 45.75
OD_K2POWER_DIOPTERS: 46.00

## 2021-09-03 ASSESSMENT — LID EXAM ASSESSMENTS
OD_BLEPHARITIS: RLL RUL
OS_BLEPHARITIS: LLL LUL

## 2021-09-03 ASSESSMENT — VISUAL ACUITY
OD_BCVA: 20/25+1
OS_BCVA: 20/30-1

## 2021-09-03 ASSESSMENT — AXIALLENGTH_DERIVED
OD_AL: 22.9794
OD_AL: 22.9794
OS_AL: 22.796
OS_AL: 22.9794

## 2021-09-13 ENCOUNTER — OPTICAL OFFICE (OUTPATIENT)
Dept: URBAN - NONMETROPOLITAN AREA CLINIC 4 | Facility: CLINIC | Age: 81
Setting detail: OPHTHALMOLOGY
End: 2021-09-13
Payer: COMMERCIAL

## 2021-09-13 ENCOUNTER — DOCTOR'S OFFICE (OUTPATIENT)
Dept: URBAN - NONMETROPOLITAN AREA CLINIC 1 | Facility: CLINIC | Age: 81
Setting detail: OPHTHALMOLOGY
End: 2021-09-13
Payer: COMMERCIAL

## 2021-09-13 DIAGNOSIS — H52.223: ICD-10-CM

## 2021-09-13 DIAGNOSIS — H52.4: ICD-10-CM

## 2021-09-13 DIAGNOSIS — H52.13: ICD-10-CM

## 2021-09-13 PROCEDURE — V2203 LENS SPHCYL BIFOCAL 4.00D/.1: HCPCS | Performed by: OPTOMETRIST

## 2021-09-13 PROCEDURE — V2744 TINT PHOTOCHROMATIC LENS/ES: HCPCS | Performed by: OPTOMETRIST

## 2021-09-13 PROCEDURE — V2781 PROGRESSIVE LENS PER LENS: HCPCS | Performed by: OPTOMETRIST

## 2021-09-13 PROCEDURE — 92012 INTRM OPH EXAM EST PATIENT: CPT | Performed by: OPTOMETRIST

## 2021-09-13 PROCEDURE — V2020 VISION SVCS FRAMES PURCHASES: HCPCS | Performed by: OPTOMETRIST

## 2021-09-13 PROCEDURE — V2025 EYEGLASSES DELUX FRAMES: HCPCS | Performed by: OPTOMETRIST

## 2021-09-13 PROCEDURE — V2784 LENS POLYCARB OR EQUAL: HCPCS | Performed by: OPTOMETRIST

## 2021-09-13 PROCEDURE — 92015 DETERMINE REFRACTIVE STATE: CPT | Performed by: OPTOMETRIST

## 2021-09-13 ASSESSMENT — KERATOMETRY
OD_AXISANGLE_DEGREES: 011
OS_AXISANGLE_DEGREES: 120
OS_K1POWER_DIOPTERS: 45.75
OD_K1POWER_DIOPTERS: 45.75
OS_K2POWER_DIOPTERS: 46.00
OD_K2POWER_DIOPTERS: 46.00

## 2021-09-13 ASSESSMENT — REFRACTION_AUTOREFRACTION
OS_SPHERE: +0.50
OD_AXIS: 180
OD_SPHERE: -1.00
OD_CYLINDER: 0.00
OS_CYLINDER: -1.25
OS_AXIS: 89

## 2021-09-13 ASSESSMENT — REFRACTION_MANIFEST
OD_VA2: 20/20
OD_AXIS: 115
OS_VA1: 20/20
OU_VA: 20/20
OS_ADD: +2.50
OD_SPHERE: -1.00
OD_CYLINDER: -0.25
OS_SPHERE: +0.50
OS_AXIS: 090
OS_VA2: 20/20
OD_VA1: 20/25-1
OD_ADD: +2.50
OS_CYLINDER: -1.25

## 2021-09-13 ASSESSMENT — VISUAL ACUITY
OD_BCVA: 20/20
OS_BCVA: 20/25-1

## 2021-09-13 ASSESSMENT — AXIALLENGTH_DERIVED
OS_AL: 22.796
OD_AL: 23.1657
OS_AL: 22.796
OD_AL: 23.1188

## 2021-09-13 ASSESSMENT — REFRACTION_CURRENTRX
OD_AXIS: 111
OS_SPHERE: +0.50
OD_OVR_VA: 20/
OD_ADD: +2.50
OS_OVR_VA: 20/
OS_VPRISM_DIRECTION: PROGS
OD_CYLINDER: -0.25
OD_VPRISM_DIRECTION: PROGS
OS_CYLINDER: -1.50
OS_AXIS: 86
OD_SPHERE: -0.50
OS_ADD: +2.50

## 2021-09-13 ASSESSMENT — SPHEQUIV_DERIVED
OS_SPHEQUIV: -0.125
OS_SPHEQUIV: -0.125
OD_SPHEQUIV: -1.125
OD_SPHEQUIV: -1

## 2021-10-04 ENCOUNTER — DOCTOR'S OFFICE (OUTPATIENT)
Dept: URBAN - NONMETROPOLITAN AREA CLINIC 1 | Facility: CLINIC | Age: 81
Setting detail: OPHTHALMOLOGY
End: 2021-10-04
Payer: COMMERCIAL

## 2021-10-04 DIAGNOSIS — H43.812: ICD-10-CM

## 2021-10-04 DIAGNOSIS — H35.81: ICD-10-CM

## 2021-10-04 DIAGNOSIS — H35.371: ICD-10-CM

## 2021-10-04 PROCEDURE — 92014 COMPRE OPH EXAM EST PT 1/>: CPT | Performed by: OPHTHALMOLOGY

## 2021-10-04 PROCEDURE — 92201 OPSCPY EXTND RTA DRAW UNI/BI: CPT | Performed by: OPHTHALMOLOGY

## 2021-10-04 PROCEDURE — 92134 CPTRZ OPH DX IMG PST SGM RTA: CPT | Performed by: OPHTHALMOLOGY

## 2021-10-04 ASSESSMENT — REFRACTION_CURRENTRX
OS_VPRISM_DIRECTION: PROGS
OS_SPHERE: +0.50
OS_AXIS: 86
OD_VPRISM_DIRECTION: PROGS
OD_ADD: +2.50
OD_SPHERE: -0.50
OD_AXIS: 111
OD_OVR_VA: 20/
OS_OVR_VA: 20/
OD_CYLINDER: -0.25
OS_ADD: +2.50
OS_CYLINDER: -1.50

## 2021-10-04 ASSESSMENT — REFRACTION_MANIFEST
OD_ADD: +2.50
OS_CYLINDER: -1.25
OD_CYLINDER: -0.25
OS_ADD: +2.50
OD_AXIS: 115
OU_VA: 20/20
OS_AXIS: 090
OD_SPHERE: -1.00
OD_VA2: 20/20
OS_VA1: 20/20
OD_VA1: 20/25-1
OS_VA2: 20/20
OS_SPHERE: +0.50

## 2021-10-04 ASSESSMENT — REFRACTION_AUTOREFRACTION
OS_CYLINDER: -1.25
OS_SPHERE: +0.50
OD_SPHERE: -1.00
OD_CYLINDER: 0.00
OD_AXIS: 180
OS_AXIS: 89

## 2021-10-04 ASSESSMENT — KERATOMETRY
OS_AXISANGLE_DEGREES: 120
OS_K1POWER_DIOPTERS: 45.75
OS_K2POWER_DIOPTERS: 46.00
OD_K1POWER_DIOPTERS: 45.75
OD_AXISANGLE_DEGREES: 011
OD_K2POWER_DIOPTERS: 46.00

## 2021-10-04 ASSESSMENT — AXIALLENGTH_DERIVED
OS_AL: 22.796
OD_AL: 23.1657
OD_AL: 23.1188
OS_AL: 22.796

## 2021-10-04 ASSESSMENT — LID EXAM ASSESSMENTS
OS_BLEPHARITIS: LLL LUL
OD_BLEPHARITIS: RLL RUL

## 2021-10-04 ASSESSMENT — SPHEQUIV_DERIVED
OD_SPHEQUIV: -1
OS_SPHEQUIV: -0.125
OD_SPHEQUIV: -1.125
OS_SPHEQUIV: -0.125

## 2021-10-04 ASSESSMENT — CONFRONTATIONAL VISUAL FIELD TEST (CVF)
OS_FINDINGS: FULL
OD_FINDINGS: FULL

## 2021-10-04 ASSESSMENT — VISUAL ACUITY
OS_BCVA: 20/30+2
OD_BCVA: 20/20-1

## 2021-10-11 ENCOUNTER — DOCTOR'S OFFICE (OUTPATIENT)
Dept: URBAN - NONMETROPOLITAN AREA CLINIC 1 | Facility: CLINIC | Age: 81
Setting detail: OPHTHALMOLOGY
End: 2021-10-11
Payer: COMMERCIAL

## 2021-10-11 DIAGNOSIS — H35.81: ICD-10-CM

## 2021-10-11 DIAGNOSIS — H35.371: ICD-10-CM

## 2021-10-11 DIAGNOSIS — H43.812: ICD-10-CM

## 2021-10-11 PROCEDURE — 92014 COMPRE OPH EXAM EST PT 1/>: CPT | Performed by: OPHTHALMOLOGY

## 2021-10-11 PROCEDURE — 92235 FLUORESCEIN ANGRPH MLTIFRAME: CPT | Performed by: OPHTHALMOLOGY

## 2021-10-11 PROCEDURE — 92250 FUNDUS PHOTOGRAPHY W/I&R: CPT | Performed by: OPHTHALMOLOGY

## 2021-10-11 ASSESSMENT — LID EXAM ASSESSMENTS
OS_BLEPHARITIS: LLL LUL
OD_BLEPHARITIS: RLL RUL

## 2021-10-11 ASSESSMENT — REFRACTION_AUTOREFRACTION
OS_SPHERE: +0.50
OS_CYLINDER: -1.25
OD_SPHERE: -1.00
OS_AXIS: 89
OD_CYLINDER: 0.00
OD_AXIS: 180

## 2021-10-11 ASSESSMENT — REFRACTION_CURRENTRX
OS_CYLINDER: -1.50
OS_SPHERE: +0.50
OS_ADD: +2.50
OD_OVR_VA: 20/
OD_AXIS: 111
OS_VPRISM_DIRECTION: PROGS
OD_VPRISM_DIRECTION: PROGS
OD_ADD: +2.50
OS_OVR_VA: 20/
OS_AXIS: 86
OD_CYLINDER: -0.25
OD_SPHERE: -0.50

## 2021-10-11 ASSESSMENT — TONOMETRY: OS_IOP_MMHG: 15

## 2021-10-11 ASSESSMENT — AXIALLENGTH_DERIVED
OS_AL: 22.796
OD_AL: 23.1188
OS_AL: 22.796
OD_AL: 23.1657

## 2021-10-11 ASSESSMENT — VISUAL ACUITY
OD_BCVA: 20/25-1
OS_BCVA: 20/30

## 2021-10-11 ASSESSMENT — REFRACTION_MANIFEST
OD_CYLINDER: -0.25
OS_SPHERE: +0.50
OS_CYLINDER: -1.25
OD_VA1: 20/25-1
OS_VA1: 20/20
OD_ADD: +2.50
OS_VA2: 20/20
OS_ADD: +2.50
OD_AXIS: 115
OU_VA: 20/20
OD_VA2: 20/20
OD_SPHERE: -1.00
OS_AXIS: 090

## 2021-10-11 ASSESSMENT — CONFRONTATIONAL VISUAL FIELD TEST (CVF)
OD_FINDINGS: FULL
OS_FINDINGS: FULL

## 2021-10-11 ASSESSMENT — SPHEQUIV_DERIVED
OD_SPHEQUIV: -1.125
OD_SPHEQUIV: -1
OS_SPHEQUIV: -0.125
OS_SPHEQUIV: -0.125

## 2021-10-11 ASSESSMENT — KERATOMETRY
OD_AXISANGLE_DEGREES: 011
OD_K2POWER_DIOPTERS: 46.00
OD_K1POWER_DIOPTERS: 45.75
OS_K1POWER_DIOPTERS: 45.75
OS_AXISANGLE_DEGREES: 120
OS_K2POWER_DIOPTERS: 46.00

## 2021-10-11 ASSESSMENT — PACHYMETRY
OD_CT_UM: 553
OD_CT_CORRECTION: -1

## 2021-10-18 ENCOUNTER — OPTICAL OFFICE (OUTPATIENT)
Dept: URBAN - NONMETROPOLITAN AREA CLINIC 4 | Facility: CLINIC | Age: 81
Setting detail: OPHTHALMOLOGY
End: 2021-10-18

## 2021-10-18 ENCOUNTER — DOCTOR'S OFFICE (OUTPATIENT)
Dept: URBAN - NONMETROPOLITAN AREA CLINIC 1 | Facility: CLINIC | Age: 81
Setting detail: OPHTHALMOLOGY
End: 2021-10-18
Payer: COMMERCIAL

## 2021-10-18 DIAGNOSIS — H40.1113: ICD-10-CM

## 2021-10-18 DIAGNOSIS — H52.223: ICD-10-CM

## 2021-10-18 DIAGNOSIS — H40.1121: ICD-10-CM

## 2021-10-18 PROCEDURE — V2784 LENS POLYCARB OR EQUAL: HCPCS | Performed by: OPTOMETRIST

## 2021-10-18 PROCEDURE — V2750 ANTI-REFLECTIVE COATING: HCPCS | Performed by: OPTOMETRIST

## 2021-10-18 PROCEDURE — V2781 PROGRESSIVE LENS PER LENS: HCPCS | Performed by: OPTOMETRIST

## 2021-10-18 PROCEDURE — V2020 VISION SVCS FRAMES PURCHASES: HCPCS | Performed by: OPTOMETRIST

## 2021-10-18 PROCEDURE — V2761 MIRROR COATING: HCPCS | Performed by: OPTOMETRIST

## 2021-10-18 PROCEDURE — 99212 OFFICE O/P EST SF 10 MIN: CPT | Performed by: OPHTHALMOLOGY

## 2021-10-18 ASSESSMENT — SPHEQUIV_DERIVED
OD_SPHEQUIV: -1
OS_SPHEQUIV: -0.125
OD_SPHEQUIV: -1.125
OS_SPHEQUIV: -0.125

## 2021-10-18 ASSESSMENT — REFRACTION_MANIFEST
OU_VA: 20/20
OS_VA1: 20/20
OS_SPHERE: +0.50
OD_VA1: 20/25-1
OS_AXIS: 090
OD_CYLINDER: -0.25
OD_AXIS: 115
OS_CYLINDER: -1.25
OS_ADD: +2.50
OD_SPHERE: -1.00
OS_VA2: 20/20
OD_VA2: 20/20
OD_ADD: +2.50

## 2021-10-18 ASSESSMENT — KERATOMETRY
OD_K1POWER_DIOPTERS: 45.75
OS_K2POWER_DIOPTERS: 46.00
OD_K2POWER_DIOPTERS: 46.00
OS_K1POWER_DIOPTERS: 45.75
OD_AXISANGLE_DEGREES: 011
OS_AXISANGLE_DEGREES: 120

## 2021-10-18 ASSESSMENT — AXIALLENGTH_DERIVED
OD_AL: 23.1657
OS_AL: 22.796
OD_AL: 23.1188
OS_AL: 22.796

## 2021-10-18 ASSESSMENT — REFRACTION_CURRENTRX
OD_AXIS: 111
OD_ADD: +2.50
OD_OVR_VA: 20/
OD_SPHERE: -0.50
OS_OVR_VA: 20/
OS_CYLINDER: -1.50
OS_VPRISM_DIRECTION: PROGS
OS_ADD: +2.50
OS_SPHERE: +0.50
OD_VPRISM_DIRECTION: PROGS
OD_CYLINDER: -0.25
OS_AXIS: 86

## 2021-10-18 ASSESSMENT — REFRACTION_AUTOREFRACTION
OD_CYLINDER: 0.00
OD_AXIS: 180
OS_AXIS: 89
OD_SPHERE: -1.00
OS_CYLINDER: -1.25
OS_SPHERE: +0.50

## 2021-10-18 ASSESSMENT — LID EXAM ASSESSMENTS
OS_BLEPHARITIS: LLL LUL
OD_BLEPHARITIS: RLL RUL

## 2021-10-18 ASSESSMENT — VISUAL ACUITY
OD_BCVA: 20/25+2
OS_BCVA: 20/25-2

## 2021-11-22 ENCOUNTER — DOCTOR'S OFFICE (OUTPATIENT)
Dept: URBAN - NONMETROPOLITAN AREA CLINIC 1 | Facility: CLINIC | Age: 81
Setting detail: OPHTHALMOLOGY
End: 2021-11-22
Payer: COMMERCIAL

## 2021-11-22 DIAGNOSIS — H35.371: ICD-10-CM

## 2021-11-22 DIAGNOSIS — H35.81: ICD-10-CM

## 2021-11-22 DIAGNOSIS — H43.812: ICD-10-CM

## 2021-11-22 PROCEDURE — 92134 CPTRZ OPH DX IMG PST SGM RTA: CPT | Performed by: OPHTHALMOLOGY

## 2021-11-22 PROCEDURE — 99213 OFFICE O/P EST LOW 20 MIN: CPT | Performed by: OPHTHALMOLOGY

## 2021-11-22 ASSESSMENT — REFRACTION_CURRENTRX
OD_VPRISM_DIRECTION: PROGS
OS_SPHERE: +0.50
OS_AXIS: 86
OD_SPHERE: -0.50
OD_AXIS: 111
OS_OVR_VA: 20/
OS_ADD: +2.50
OD_CYLINDER: -0.25
OD_ADD: +2.50
OS_CYLINDER: -1.50
OD_OVR_VA: 20/
OS_VPRISM_DIRECTION: PROGS

## 2021-11-22 ASSESSMENT — VISUAL ACUITY
OS_BCVA: 20/25+2
OD_BCVA: 20/20-2

## 2021-11-22 ASSESSMENT — REFRACTION_MANIFEST
OS_AXIS: 090
OD_VA2: 20/20
OU_VA: 20/20
OS_VA1: 20/20
OS_CYLINDER: -1.25
OS_SPHERE: +0.50
OS_ADD: +2.50
OD_ADD: +2.50
OD_CYLINDER: -0.25
OD_VA1: 20/25-1
OD_AXIS: 115
OS_VA2: 20/20
OD_SPHERE: -1.00

## 2021-11-22 ASSESSMENT — AXIALLENGTH_DERIVED
OS_AL: 22.796
OS_AL: 22.796
OD_AL: 23.1657
OD_AL: 23.1188

## 2021-11-22 ASSESSMENT — REFRACTION_AUTOREFRACTION
OS_AXIS: 89
OD_SPHERE: -1.00
OS_SPHERE: +0.50
OD_CYLINDER: 0.00
OS_CYLINDER: -1.25
OD_AXIS: 180

## 2021-11-22 ASSESSMENT — CONFRONTATIONAL VISUAL FIELD TEST (CVF)
OD_FINDINGS: FULL
OS_FINDINGS: FULL

## 2021-11-22 ASSESSMENT — SPHEQUIV_DERIVED
OD_SPHEQUIV: -1
OD_SPHEQUIV: -1.125
OS_SPHEQUIV: -0.125
OS_SPHEQUIV: -0.125

## 2021-11-22 ASSESSMENT — LID EXAM ASSESSMENTS
OS_BLEPHARITIS: LLL LUL
OD_BLEPHARITIS: RLL RUL

## 2021-11-22 ASSESSMENT — KERATOMETRY
OD_K1POWER_DIOPTERS: 45.75
OS_AXISANGLE_DEGREES: 120
OD_AXISANGLE_DEGREES: 011
OS_K2POWER_DIOPTERS: 46.00
OS_K1POWER_DIOPTERS: 45.75
OD_K2POWER_DIOPTERS: 46.00

## 2021-12-13 ENCOUNTER — DOCTOR'S OFFICE (OUTPATIENT)
Dept: URBAN - NONMETROPOLITAN AREA CLINIC 1 | Facility: CLINIC | Age: 81
Setting detail: OPHTHALMOLOGY
End: 2021-12-13
Payer: COMMERCIAL

## 2021-12-13 ENCOUNTER — RX ONLY (RX ONLY)
Age: 81
End: 2021-12-13

## 2021-12-13 DIAGNOSIS — H04.122: ICD-10-CM

## 2021-12-13 DIAGNOSIS — H40.1113: ICD-10-CM

## 2021-12-13 DIAGNOSIS — H35.81: ICD-10-CM

## 2021-12-13 DIAGNOSIS — H40.1121: ICD-10-CM

## 2021-12-13 DIAGNOSIS — H40.61X3: ICD-10-CM

## 2021-12-13 DIAGNOSIS — H35.371: ICD-10-CM

## 2021-12-13 DIAGNOSIS — H04.121: ICD-10-CM

## 2021-12-13 PROCEDURE — 92012 INTRM OPH EXAM EST PATIENT: CPT | Performed by: OPHTHALMOLOGY

## 2021-12-13 RX ORDER — LATANOPROSTENE BUNOD 0.24 MG/ML
SOLUTION/ DROPS OPHTHALMIC
Qty: 2.5 | Refills: 5 | Status: ACTIVE | OUTPATIENT

## 2021-12-13 ASSESSMENT — REFRACTION_CURRENTRX
OD_VPRISM_DIRECTION: PROGS
OD_ADD: +2.50
OD_SPHERE: -0.50
OD_OVR_VA: 20/
OS_ADD: +2.50
OS_VPRISM_DIRECTION: PROGS
OS_CYLINDER: -1.50
OD_AXIS: 111
OD_CYLINDER: -0.25
OS_SPHERE: +0.50
OS_AXIS: 86
OS_OVR_VA: 20/

## 2021-12-13 ASSESSMENT — SPHEQUIV_DERIVED
OD_SPHEQUIV: -1.125
OS_SPHEQUIV: -0.125
OD_SPHEQUIV: -1
OS_SPHEQUIV: -0.125

## 2021-12-13 ASSESSMENT — REFRACTION_AUTOREFRACTION
OD_CYLINDER: 0.00
OS_AXIS: 89
OS_CYLINDER: -1.25
OS_SPHERE: +0.50
OD_SPHERE: -1.00
OD_AXIS: 180

## 2021-12-13 ASSESSMENT — REFRACTION_MANIFEST
OS_VA2: 20/20
OS_SPHERE: +0.50
OS_ADD: +2.50
OD_VA1: 20/25-1
OS_CYLINDER: -1.25
OD_VA2: 20/20
OS_AXIS: 090
OU_VA: 20/20
OD_SPHERE: -1.00
OD_ADD: +2.50
OD_CYLINDER: -0.25
OS_VA1: 20/20
OD_AXIS: 115

## 2021-12-13 ASSESSMENT — PACHYMETRY
OD_CT_CORRECTION: -1
OD_CT_UM: 553

## 2021-12-13 ASSESSMENT — AXIALLENGTH_DERIVED
OS_AL: 22.796
OD_AL: 23.1188
OS_AL: 22.796
OD_AL: 23.1657

## 2021-12-13 ASSESSMENT — CONFRONTATIONAL VISUAL FIELD TEST (CVF)
OD_FINDINGS: FULL
OS_FINDINGS: FULL

## 2021-12-13 ASSESSMENT — LID EXAM ASSESSMENTS
OS_BLEPHARITIS: LLL LUL
OD_BLEPHARITIS: RLL RUL

## 2021-12-13 ASSESSMENT — VISUAL ACUITY
OD_BCVA: 20/20
OS_BCVA: 20/25+2

## 2021-12-13 ASSESSMENT — TONOMETRY
OS_IOP_MMHG: 14
OD_IOP_MMHG: 16

## 2021-12-16 ENCOUNTER — RX ONLY (RX ONLY)
Age: 81
End: 2021-12-16

## 2021-12-16 RX ORDER — LATANOPROSTENE BUNOD 0.24 MG/ML
SOLUTION/ DROPS OPHTHALMIC
Qty: 2.5 | Refills: 5 | Status: ACTIVE | OUTPATIENT

## 2022-01-06 ENCOUNTER — RX ONLY (RX ONLY)
Age: 82
End: 2022-01-06

## 2022-01-06 RX ORDER — BROMFENAC SODIUM 0.7 MG/ML
SOLUTION/ DROPS OPHTHALMIC
Qty: 5 | Refills: 3 | Status: ACTIVE | OUTPATIENT

## 2022-01-13 ENCOUNTER — OFFICE VISIT (OUTPATIENT)
Dept: URGENT CARE | Facility: CLINIC | Age: 82
End: 2022-01-13
Payer: MEDICARE

## 2022-01-13 VITALS
HEART RATE: 60 BPM | TEMPERATURE: 98 F | BODY MASS INDEX: 25.77 KG/M2 | HEIGHT: 70 IN | WEIGHT: 180 LBS | RESPIRATION RATE: 16 BRPM | OXYGEN SATURATION: 99 %

## 2022-01-13 DIAGNOSIS — J01.90 ACUTE SINUSITIS, RECURRENCE NOT SPECIFIED, UNSPECIFIED LOCATION: Primary | ICD-10-CM

## 2022-01-13 PROCEDURE — U0003 INFECTIOUS AGENT DETECTION BY NUCLEIC ACID (DNA OR RNA); SEVERE ACUTE RESPIRATORY SYNDROME CORONAVIRUS 2 (SARS-COV-2) (CORONAVIRUS DISEASE [COVID-19]), AMPLIFIED PROBE TECHNIQUE, MAKING USE OF HIGH THROUGHPUT TECHNOLOGIES AS DESCRIBED BY CMS-2020-01-R: HCPCS | Performed by: PHYSICIAN ASSISTANT

## 2022-01-13 PROCEDURE — 99213 OFFICE O/P EST LOW 20 MIN: CPT | Performed by: PHYSICIAN ASSISTANT

## 2022-01-13 PROCEDURE — G0463 HOSPITAL OUTPT CLINIC VISIT: HCPCS | Performed by: PHYSICIAN ASSISTANT

## 2022-01-13 PROCEDURE — U0005 INFEC AGEN DETEC AMPLI PROBE: HCPCS | Performed by: PHYSICIAN ASSISTANT

## 2022-01-13 RX ORDER — CLONAZEPAM 0.5 MG/1
0.25 TABLET ORAL 3 TIMES DAILY PRN
COMMUNITY
Start: 2021-12-16

## 2022-01-13 RX ORDER — FLUTICASONE PROPIONATE 50 MCG
2 SPRAY, SUSPENSION (ML) NASAL DAILY
COMMUNITY
Start: 2021-05-22 | End: 2022-05-22

## 2022-01-13 RX ORDER — OMEPRAZOLE 40 MG/1
CAPSULE, DELAYED RELEASE ORAL
COMMUNITY
Start: 2021-11-05

## 2022-01-13 RX ORDER — LATANOPROSTENE BUNOD 0.24 MG/ML
SOLUTION/ DROPS OPHTHALMIC
COMMUNITY
Start: 2021-12-16

## 2022-01-13 RX ORDER — ATORVASTATIN CALCIUM 40 MG/1
TABLET, FILM COATED ORAL
COMMUNITY
Start: 2021-11-17

## 2022-01-13 RX ORDER — IBUPROFEN 600 MG/1
600 TABLET ORAL EVERY 6 HOURS PRN
COMMUNITY
Start: 2021-06-22 | End: 2022-06-22

## 2022-01-13 RX ORDER — AMOXICILLIN AND CLAVULANATE POTASSIUM 875; 125 MG/1; MG/1
1 TABLET, FILM COATED ORAL EVERY 12 HOURS SCHEDULED
Qty: 28 TABLET | Refills: 0 | Status: SHIPPED | OUTPATIENT
Start: 2022-01-13 | End: 2022-01-27

## 2022-01-13 NOTE — PROGRESS NOTES
[address]        NAME: Cheryle Phipps is a [de-identified] y o  male  : 1940    MRN: [id number]  DATE: 2022  TIME: 10:12 AM    Assessment and Plan   Acute sinusitis, recurrence not specified, unspecified location [J01 90]  1  Acute sinusitis, recurrence not specified, unspecified location  COVID Only -Office Collect    amoxicillin-clavulanate (AUGMENTIN) 875-125 mg per tablet         Patient Instructions     Patient's sxs c/w viral syndrome which developed into sinusitis  Discussed etiology of illness, realistic length of illness/sxs duration, and palliative factors with patient  Take antibiotic as prescribed  C/w flonase 2 sprays qday  May trial nasal saline spray  May c/w pepto prn  Follow up with PCP in 3-5 days if sxs worsen/persist  Proceed to  ER if symptoms worsen- such as if sob occurs    Chief Complaint     Chief Complaint   Patient presents with    blocked ear     left ear blocked secondary to sinus pressure and h/a  Also has some diarrhea         History of Present Illness       [de-identified] M presents with left sided maxillary sinus pressure and left ear blockage x 10 days  Patient states he began with diarrhea, runny nose, and PND which have lessened since initial onset, but sinus and ear pressure persists  He has used pepto-bismol for diarrhea with moderate relief and flonase for congestion with mild relief  He denies n/v/d, sob/wheezing, fever/chills, loss of taste/smell, sob, cough, wheezing  He is fully vaccinated against flu and COVID  Review of Systems   Review of Systems   Constitutional: Negative for chills, fatigue and fever  HENT: Positive for congestion, ear pain, rhinorrhea, sinus pressure and sinus pain  Negative for sore throat  Respiratory: Negative for cough, chest tightness and wheezing  Cardiovascular: Negative for chest pain and palpitations  Gastrointestinal: Negative for abdominal pain, diarrhea, nausea and vomiting           Current Medications Current Outpatient Medications:     atorvastatin (LIPITOR) 40 mg tablet, , Disp: , Rfl:     clonazePAM (KlonoPIN) 0 5 mg tablet, Take 0 25 mg by mouth Three times daily as needed, Disp: , Rfl:     Dorzolamide HCl-Timolol Mal PF 22 3-6 8 MG/ML SOLN, Apply 1 drop to eye 2 (two) times a day, Disp: , Rfl:     fluticasone (FLONASE) 50 mcg/act nasal spray, 2 sprays into each nostril daily, Disp: , Rfl:     ibuprofen (MOTRIN) 600 mg tablet, Take 600 mg by mouth every 6 (six) hours as needed, Disp: , Rfl:     Latanoprostene Bunod 0 024 % SOLN, 1 drop both eyes at bedtime, Disp: , Rfl:     omeprazole (PriLOSEC) 40 MG capsule, , Disp: , Rfl:     Vyzulta 0 024 % SOLN, , Disp: , Rfl:     amoxicillin-clavulanate (AUGMENTIN) 875-125 mg per tablet, Take 1 tablet by mouth every 12 (twelve) hours for 14 days, Disp: 28 tablet, Rfl: 0    Current Allergies     Allergies as of 01/13/2022    (No Known Allergies)            The following portions of the patient's history were reviewed and updated as appropriate: allergies, current medications, past family history, past medical history, past social history, past surgical history and problem list      Past Medical History:   Diagnosis Date    GERD (gastroesophageal reflux disease)     High cholesterol        Past Surgical History:   Procedure Laterality Date    EYE SURGERY      HERNIA REPAIR         Family History   Problem Relation Age of Onset    Completed Suicide  Mother     Cancer Father          Medications have been verified  Objective   Pulse 60   Temp 98 °F (36 7 °C)   Resp 16   Ht 5' 10" (1 778 m)   Wt 81 6 kg (180 lb)   SpO2 99%   BMI 25 83 kg/m²   No LMP for male patient  Physical Exam     Physical Exam  Constitutional:       Appearance: He is well-developed  HENT:      Head: Normocephalic  Right Ear: Hearing, tympanic membrane, ear canal and external ear normal  No middle ear effusion   Tympanic membrane is not erythematous or bulging  Left Ear: Hearing, ear canal and external ear normal  A middle ear effusion is present  Tympanic membrane is bulging  Tympanic membrane is not erythematous  Nose: Mucosal edema, congestion and rhinorrhea present  Rhinorrhea is purulent  Right Sinus: No maxillary sinus tenderness or frontal sinus tenderness  Left Sinus: Maxillary sinus tenderness present  No frontal sinus tenderness  Mouth/Throat:      Pharynx: No oropharyngeal exudate or posterior oropharyngeal erythema  Tonsils: No tonsillar exudate  Cardiovascular:      Rate and Rhythm: Normal rate and regular rhythm  Heart sounds: Normal heart sounds  No murmur heard  No friction rub  No gallop  Pulmonary:      Effort: Pulmonary effort is normal  No respiratory distress  Breath sounds: Normal breath sounds  No stridor  No decreased breath sounds, wheezing, rhonchi or rales  Abdominal:      General: Bowel sounds are normal  There is no distension  Palpations: Abdomen is soft  There is no mass  Tenderness: There is no abdominal tenderness  There is no guarding or rebound  Lymphadenopathy:      Cervical: Cervical adenopathy present  Right cervical: No superficial cervical adenopathy  Left cervical: Superficial cervical adenopathy present

## 2022-01-14 LAB — SARS-COV-2 RNA RESP QL NAA+PROBE: NEGATIVE

## 2022-01-15 ENCOUNTER — TELEPHONE (OUTPATIENT)
Dept: URGENT CARE | Facility: CLINIC | Age: 82
End: 2022-01-15

## 2022-01-24 ENCOUNTER — DOCTOR'S OFFICE (OUTPATIENT)
Dept: URBAN - NONMETROPOLITAN AREA CLINIC 1 | Facility: CLINIC | Age: 82
Setting detail: OPHTHALMOLOGY
End: 2022-01-24
Payer: COMMERCIAL

## 2022-01-24 DIAGNOSIS — H35.371: ICD-10-CM

## 2022-01-24 DIAGNOSIS — H35.81: ICD-10-CM

## 2022-01-24 DIAGNOSIS — H43.812: ICD-10-CM

## 2022-01-24 DIAGNOSIS — H40.1113: ICD-10-CM

## 2022-01-24 PROCEDURE — 92202 OPSCPY EXTND ON/MAC DRAW: CPT | Performed by: OPHTHALMOLOGY

## 2022-01-24 PROCEDURE — 99214 OFFICE O/P EST MOD 30 MIN: CPT | Performed by: OPHTHALMOLOGY

## 2022-01-24 PROCEDURE — 92134 CPTRZ OPH DX IMG PST SGM RTA: CPT | Performed by: OPHTHALMOLOGY

## 2022-01-24 ASSESSMENT — AXIALLENGTH_DERIVED
OS_AL: 22.796
OS_AL: 22.796
OD_AL: 23.1188
OD_AL: 23.1657

## 2022-01-24 ASSESSMENT — REFRACTION_AUTOREFRACTION
OS_AXIS: 89
OD_SPHERE: -1.00
OS_SPHERE: +0.50
OD_AXIS: 180
OS_CYLINDER: -1.25
OD_CYLINDER: 0.00

## 2022-01-24 ASSESSMENT — REFRACTION_CURRENTRX
OD_SPHERE: -0.50
OD_ADD: +2.50
OS_VPRISM_DIRECTION: PROGS
OD_AXIS: 111
OD_VPRISM_DIRECTION: PROGS
OD_CYLINDER: -0.25
OS_AXIS: 86
OS_OVR_VA: 20/
OS_SPHERE: +0.50
OS_CYLINDER: -1.50
OD_OVR_VA: 20/
OS_ADD: +2.50

## 2022-01-24 ASSESSMENT — REFRACTION_MANIFEST
OD_VA1: 20/25-1
OD_AXIS: 115
OD_VA2: 20/20
OD_CYLINDER: -0.25
OS_VA2: 20/20
OD_ADD: +2.50
OD_SPHERE: -1.00
OS_SPHERE: +0.50
OU_VA: 20/20
OS_ADD: +2.50
OS_AXIS: 090
OS_CYLINDER: -1.25
OS_VA1: 20/20

## 2022-01-24 ASSESSMENT — KERATOMETRY
OD_AXISANGLE_DEGREES: 011
OS_AXISANGLE_DEGREES: 120
OD_K2POWER_DIOPTERS: 46.00
OS_K2POWER_DIOPTERS: 46.00
OS_K1POWER_DIOPTERS: 45.75
OD_K1POWER_DIOPTERS: 45.75

## 2022-01-24 ASSESSMENT — VISUAL ACUITY
OS_BCVA: 20/30-1
OD_BCVA: 20/25+2

## 2022-01-24 ASSESSMENT — SPHEQUIV_DERIVED
OS_SPHEQUIV: -0.125
OD_SPHEQUIV: -1
OS_SPHEQUIV: -0.125
OD_SPHEQUIV: -1.125

## 2022-01-24 ASSESSMENT — LID EXAM ASSESSMENTS
OS_BLEPHARITIS: LLL LUL
OD_BLEPHARITIS: RLL RUL

## 2022-01-24 ASSESSMENT — CONFRONTATIONAL VISUAL FIELD TEST (CVF)
OS_FINDINGS: FULL
OD_FINDINGS: FULL

## 2022-01-31 ENCOUNTER — RX ONLY (RX ONLY)
Age: 82
End: 2022-01-31

## 2022-01-31 ENCOUNTER — DOCTOR'S OFFICE (OUTPATIENT)
Dept: URBAN - NONMETROPOLITAN AREA CLINIC 1 | Facility: CLINIC | Age: 82
Setting detail: OPHTHALMOLOGY
End: 2022-01-31
Payer: COMMERCIAL

## 2022-01-31 DIAGNOSIS — H43.812: ICD-10-CM

## 2022-01-31 DIAGNOSIS — H35.371: ICD-10-CM

## 2022-01-31 DIAGNOSIS — H35.81: ICD-10-CM

## 2022-01-31 PROCEDURE — 92250 FUNDUS PHOTOGRAPHY W/I&R: CPT | Performed by: OPHTHALMOLOGY

## 2022-01-31 PROCEDURE — 99214 OFFICE O/P EST MOD 30 MIN: CPT | Performed by: OPHTHALMOLOGY

## 2022-01-31 PROCEDURE — 92235 FLUORESCEIN ANGRPH MLTIFRAME: CPT | Performed by: OPHTHALMOLOGY

## 2022-01-31 ASSESSMENT — REFRACTION_CURRENTRX
OD_VPRISM_DIRECTION: PROGS
OS_SPHERE: +0.50
OD_OVR_VA: 20/
OD_SPHERE: -0.50
OS_OVR_VA: 20/
OS_CYLINDER: -1.50
OD_ADD: +2.50
OS_ADD: +2.50
OD_CYLINDER: -0.25
OS_AXIS: 86
OS_VPRISM_DIRECTION: PROGS
OD_AXIS: 111

## 2022-01-31 ASSESSMENT — KERATOMETRY
OD_AXISANGLE_DEGREES: 011
OD_K1POWER_DIOPTERS: 45.75
OS_K1POWER_DIOPTERS: 45.75
OS_AXISANGLE_DEGREES: 120
OS_K2POWER_DIOPTERS: 46.00
OD_K2POWER_DIOPTERS: 46.00

## 2022-01-31 ASSESSMENT — REFRACTION_AUTOREFRACTION
OD_CYLINDER: 0.00
OS_CYLINDER: -1.25
OD_AXIS: 180
OS_AXIS: 89
OD_SPHERE: -1.00
OS_SPHERE: +0.50

## 2022-01-31 ASSESSMENT — SPHEQUIV_DERIVED
OS_SPHEQUIV: -0.125
OD_SPHEQUIV: -1.125
OS_SPHEQUIV: -0.125
OD_SPHEQUIV: -1

## 2022-01-31 ASSESSMENT — AXIALLENGTH_DERIVED
OD_AL: 23.1657
OD_AL: 23.1188
OS_AL: 22.796
OS_AL: 22.796

## 2022-01-31 ASSESSMENT — REFRACTION_MANIFEST
OD_VA2: 20/20
OS_VA2: 20/20
OU_VA: 20/20
OD_AXIS: 115
OS_CYLINDER: -1.25
OD_ADD: +2.50
OS_SPHERE: +0.50
OS_ADD: +2.50
OS_AXIS: 090
OS_VA1: 20/20
OD_SPHERE: -1.00
OD_VA1: 20/25-1
OD_CYLINDER: -0.25

## 2022-01-31 ASSESSMENT — CONFRONTATIONAL VISUAL FIELD TEST (CVF)
OS_FINDINGS: FULL
OD_FINDINGS: FULL

## 2022-01-31 ASSESSMENT — LID EXAM ASSESSMENTS
OD_BLEPHARITIS: RLL RUL
OS_BLEPHARITIS: LLL LUL

## 2022-01-31 ASSESSMENT — VISUAL ACUITY
OD_BCVA: 20/25
OS_BCVA: 20/20-2

## 2022-02-04 ENCOUNTER — RX ONLY (RX ONLY)
Age: 82
End: 2022-02-04

## 2022-02-04 RX ORDER — LOTEPREDNOL ETABONATE 5 MG/ML
SUSPENSION/ DROPS OPHTHALMIC
Qty: 1 | Refills: 3 | Status: ACTIVE | OUTPATIENT

## 2022-02-21 ENCOUNTER — RX ONLY (RX ONLY)
Age: 82
End: 2022-02-21

## 2022-02-21 ENCOUNTER — DOCTOR'S OFFICE (OUTPATIENT)
Dept: URBAN - NONMETROPOLITAN AREA CLINIC 1 | Facility: CLINIC | Age: 82
Setting detail: OPHTHALMOLOGY
End: 2022-02-21
Payer: COMMERCIAL

## 2022-02-21 DIAGNOSIS — H40.61X3: ICD-10-CM

## 2022-02-21 DIAGNOSIS — H40.1121: ICD-10-CM

## 2022-02-21 DIAGNOSIS — H35.81: ICD-10-CM

## 2022-02-21 DIAGNOSIS — H43.812: ICD-10-CM

## 2022-02-21 DIAGNOSIS — H35.371: ICD-10-CM

## 2022-02-21 DIAGNOSIS — H40.1113: ICD-10-CM

## 2022-02-21 PROCEDURE — 99213 OFFICE O/P EST LOW 20 MIN: CPT | Performed by: OPHTHALMOLOGY

## 2022-02-21 PROCEDURE — 92134 CPTRZ OPH DX IMG PST SGM RTA: CPT | Performed by: OPHTHALMOLOGY

## 2022-02-21 ASSESSMENT — SPHEQUIV_DERIVED
OD_SPHEQUIV: -1
OS_SPHEQUIV: -0.125
OS_SPHEQUIV: -0.125
OD_SPHEQUIV: -1.125

## 2022-02-21 ASSESSMENT — REFRACTION_CURRENTRX
OD_ADD: +2.50
OD_SPHERE: -0.50
OS_ADD: +2.50
OS_CYLINDER: -1.50
OS_OVR_VA: 20/
OD_OVR_VA: 20/
OD_CYLINDER: -0.25
OD_VPRISM_DIRECTION: PROGS
OS_SPHERE: +0.50
OD_AXIS: 111
OS_AXIS: 86
OS_VPRISM_DIRECTION: PROGS

## 2022-02-21 ASSESSMENT — REFRACTION_MANIFEST
OS_SPHERE: +0.50
OD_AXIS: 115
OU_VA: 20/20
OD_VA1: 20/25-1
OS_CYLINDER: -1.25
OD_CYLINDER: -0.25
OS_ADD: +2.50
OS_VA2: 20/20
OS_VA1: 20/20
OD_SPHERE: -1.00
OD_ADD: +2.50
OD_VA2: 20/20
OS_AXIS: 090

## 2022-02-21 ASSESSMENT — KERATOMETRY
OD_K2POWER_DIOPTERS: 46.00
OS_K2POWER_DIOPTERS: 46.00
OS_K1POWER_DIOPTERS: 45.75
OD_K1POWER_DIOPTERS: 45.75
OS_AXISANGLE_DEGREES: 120
OD_AXISANGLE_DEGREES: 011

## 2022-02-21 ASSESSMENT — CONFRONTATIONAL VISUAL FIELD TEST (CVF)
OD_FINDINGS: FULL
OS_FINDINGS: FULL

## 2022-02-21 ASSESSMENT — VISUAL ACUITY
OD_BCVA: 20/20-1
OS_BCVA: 20/25-1

## 2022-02-21 ASSESSMENT — AXIALLENGTH_DERIVED
OD_AL: 23.1188
OD_AL: 23.1657
OS_AL: 22.796
OS_AL: 22.796

## 2022-02-21 ASSESSMENT — REFRACTION_AUTOREFRACTION
OD_AXIS: 180
OS_SPHERE: +0.50
OD_SPHERE: -1.00
OD_CYLINDER: 0.00
OS_CYLINDER: -1.25
OS_AXIS: 89

## 2022-02-21 ASSESSMENT — LID EXAM ASSESSMENTS
OD_BLEPHARITIS: RLL RUL
OS_BLEPHARITIS: LLL LUL

## 2022-04-05 ENCOUNTER — RX ONLY (RX ONLY)
Age: 82
End: 2022-04-05

## 2022-04-05 RX ORDER — DORZOLAMIDE HYDROCHLORIDE AND TIMOLOL MALEATE 20; 5 MG/ML; MG/ML
SOLUTION OPHTHALMIC
Qty: 5 | Refills: 4 | Status: ACTIVE | OUTPATIENT

## 2022-04-05 RX ORDER — DORZOLAMIDE HYDROCHLORIDE AND TIMOLOL MALEATE 20; 5 MG/ML; MG/ML
SOLUTION OPHTHALMIC
Qty: 10 | Refills: 4 | Status: ACTIVE | OUTPATIENT

## 2022-04-11 ENCOUNTER — DOCTOR'S OFFICE (OUTPATIENT)
Dept: URBAN - NONMETROPOLITAN AREA CLINIC 1 | Facility: CLINIC | Age: 82
Setting detail: OPHTHALMOLOGY
End: 2022-04-11
Payer: COMMERCIAL

## 2022-04-11 DIAGNOSIS — H04.121: ICD-10-CM

## 2022-04-11 DIAGNOSIS — H35.81: ICD-10-CM

## 2022-04-11 DIAGNOSIS — H04.122: ICD-10-CM

## 2022-04-11 DIAGNOSIS — H35.371: ICD-10-CM

## 2022-04-11 DIAGNOSIS — H43.812: ICD-10-CM

## 2022-04-11 PROCEDURE — 99213 OFFICE O/P EST LOW 20 MIN: CPT | Performed by: OPHTHALMOLOGY

## 2022-04-11 PROCEDURE — 83861 MICROFLUID ANALY TEARS: CPT | Performed by: OPHTHALMOLOGY

## 2022-04-11 PROCEDURE — 92134 CPTRZ OPH DX IMG PST SGM RTA: CPT | Performed by: OPHTHALMOLOGY

## 2022-04-11 ASSESSMENT — REFRACTION_CURRENTRX
OS_SPHERE: +0.50
OD_SPHERE: -0.50
OS_OVR_VA: 20/
OD_AXIS: 111
OS_AXIS: 86
OS_ADD: +2.50
OD_OVR_VA: 20/
OD_CYLINDER: -0.25
OD_VPRISM_DIRECTION: PROGS
OD_ADD: +2.50
OS_CYLINDER: -1.50
OS_VPRISM_DIRECTION: PROGS

## 2022-04-11 ASSESSMENT — AXIALLENGTH_DERIVED
OS_AL: 22.796
OS_AL: 22.796
OD_AL: 23.1188
OD_AL: 23.1657

## 2022-04-11 ASSESSMENT — REFRACTION_MANIFEST
OS_ADD: +2.50
OS_AXIS: 090
OS_VA2: 20/20
OS_VA1: 20/20
OU_VA: 20/20
OD_VA1: 20/25-1
OD_AXIS: 115
OS_CYLINDER: -1.25
OD_ADD: +2.50
OD_CYLINDER: -0.25
OD_SPHERE: -1.00
OD_VA2: 20/20
OS_SPHERE: +0.50

## 2022-04-11 ASSESSMENT — CONFRONTATIONAL VISUAL FIELD TEST (CVF)
OD_FINDINGS: FULL
OS_FINDINGS: FULL

## 2022-04-11 ASSESSMENT — SPHEQUIV_DERIVED
OD_SPHEQUIV: -1
OD_SPHEQUIV: -1.125
OS_SPHEQUIV: -0.125
OS_SPHEQUIV: -0.125

## 2022-04-11 ASSESSMENT — REFRACTION_AUTOREFRACTION
OS_SPHERE: +0.50
OD_SPHERE: -1.00
OS_AXIS: 89
OD_AXIS: 180
OD_CYLINDER: 0.00
OS_CYLINDER: -1.25

## 2022-04-11 ASSESSMENT — VISUAL ACUITY
OD_BCVA: 20/25+2
OS_BCVA: 20/25-2

## 2022-04-11 ASSESSMENT — LID EXAM ASSESSMENTS
OD_BLEPHARITIS: RLL RUL
OS_BLEPHARITIS: LLL LUL

## 2022-04-25 ENCOUNTER — RX ONLY (RX ONLY)
Age: 82
End: 2022-04-25

## 2022-04-25 ENCOUNTER — DOCTOR'S OFFICE (OUTPATIENT)
Dept: URBAN - NONMETROPOLITAN AREA CLINIC 1 | Facility: CLINIC | Age: 82
Setting detail: OPHTHALMOLOGY
End: 2022-04-25
Payer: COMMERCIAL

## 2022-04-25 DIAGNOSIS — H35.81: ICD-10-CM

## 2022-04-25 DIAGNOSIS — H35.371: ICD-10-CM

## 2022-04-25 DIAGNOSIS — H43.812: ICD-10-CM

## 2022-04-25 DIAGNOSIS — H04.121: ICD-10-CM

## 2022-04-25 PROCEDURE — 99213 OFFICE O/P EST LOW 20 MIN: CPT | Performed by: OPHTHALMOLOGY

## 2022-04-25 PROCEDURE — 92250 FUNDUS PHOTOGRAPHY W/I&R: CPT | Performed by: OPHTHALMOLOGY

## 2022-04-25 PROCEDURE — 92235 FLUORESCEIN ANGRPH MLTIFRAME: CPT | Performed by: OPHTHALMOLOGY

## 2022-04-25 ASSESSMENT — REFRACTION_MANIFEST
OD_VA1: 20/25-1
OD_ADD: +2.50
OD_VA2: 20/20
OD_AXIS: 115
OS_SPHERE: +0.50
OS_AXIS: 090
OD_CYLINDER: -0.25
OS_VA1: 20/20
OD_SPHERE: -1.00
OS_ADD: +2.50
OS_VA2: 20/20
OU_VA: 20/20
OS_CYLINDER: -1.25

## 2022-04-25 ASSESSMENT — REFRACTION_CURRENTRX
OD_OVR_VA: 20/
OS_SPHERE: +0.50
OD_ADD: +2.50
OD_AXIS: 111
OS_OVR_VA: 20/
OD_CYLINDER: -0.25
OD_VPRISM_DIRECTION: PROGS
OD_SPHERE: -0.50
OS_AXIS: 86
OS_VPRISM_DIRECTION: PROGS
OS_CYLINDER: -1.50
OS_ADD: +2.50

## 2022-04-25 ASSESSMENT — KERATOMETRY
OD_K2POWER_DIOPTERS: 46.00
OS_AXISANGLE_DEGREES: 120
OD_K1POWER_DIOPTERS: 45.75
OD_AXISANGLE_DEGREES: 011
OS_K1POWER_DIOPTERS: 45.75
OS_K2POWER_DIOPTERS: 46.00

## 2022-04-25 ASSESSMENT — CONFRONTATIONAL VISUAL FIELD TEST (CVF)
OD_FINDINGS: FULL
OS_FINDINGS: FULL

## 2022-04-25 ASSESSMENT — AXIALLENGTH_DERIVED
OS_AL: 22.796
OD_AL: 23.1657
OS_AL: 22.796
OD_AL: 23.1188

## 2022-04-25 ASSESSMENT — REFRACTION_AUTOREFRACTION
OD_SPHERE: -1.00
OS_AXIS: 89
OD_CYLINDER: 0.00
OS_SPHERE: +0.50
OD_AXIS: 180
OS_CYLINDER: -1.25

## 2022-04-25 ASSESSMENT — LID EXAM ASSESSMENTS
OD_BLEPHARITIS: RLL RUL
OS_BLEPHARITIS: LLL LUL

## 2022-04-25 ASSESSMENT — VISUAL ACUITY
OD_BCVA: 20/20
OS_BCVA: 20/25-1

## 2022-04-25 ASSESSMENT — SPHEQUIV_DERIVED
OS_SPHEQUIV: -0.125
OS_SPHEQUIV: -0.125
OD_SPHEQUIV: -1.125
OD_SPHEQUIV: -1

## 2022-05-23 ENCOUNTER — DOCTOR'S OFFICE (OUTPATIENT)
Dept: URBAN - NONMETROPOLITAN AREA CLINIC 1 | Facility: CLINIC | Age: 82
Setting detail: OPHTHALMOLOGY
End: 2022-05-23
Payer: COMMERCIAL

## 2022-05-23 DIAGNOSIS — H35.371: ICD-10-CM

## 2022-05-23 DIAGNOSIS — H04.122: ICD-10-CM

## 2022-05-23 DIAGNOSIS — H04.121: ICD-10-CM

## 2022-05-23 DIAGNOSIS — H35.81: ICD-10-CM

## 2022-05-23 DIAGNOSIS — H43.812: ICD-10-CM

## 2022-05-23 PROCEDURE — 83861 MICROFLUID ANALY TEARS: CPT | Performed by: OPHTHALMOLOGY

## 2022-05-23 PROCEDURE — 99213 OFFICE O/P EST LOW 20 MIN: CPT | Performed by: OPHTHALMOLOGY

## 2022-05-23 PROCEDURE — 92134 CPTRZ OPH DX IMG PST SGM RTA: CPT | Performed by: OPHTHALMOLOGY

## 2022-05-23 ASSESSMENT — AXIALLENGTH_DERIVED
OD_AL: 23.1657
OS_AL: 22.796
OD_AL: 23.1188
OS_AL: 22.796

## 2022-05-23 ASSESSMENT — REFRACTION_AUTOREFRACTION
OD_SPHERE: -1.00
OS_CYLINDER: -1.25
OD_CYLINDER: 0.00
OS_SPHERE: +0.50
OD_AXIS: 180
OS_AXIS: 89

## 2022-05-23 ASSESSMENT — REFRACTION_CURRENTRX
OS_ADD: +2.50
OS_SPHERE: +0.50
OS_VPRISM_DIRECTION: PROGS
OD_ADD: +2.50
OD_OVR_VA: 20/
OD_VPRISM_DIRECTION: PROGS
OD_AXIS: 111
OS_CYLINDER: -1.50
OD_SPHERE: -0.50
OS_OVR_VA: 20/
OS_AXIS: 86
OD_CYLINDER: -0.25

## 2022-05-23 ASSESSMENT — KERATOMETRY
OD_AXISANGLE_DEGREES: 011
OD_K1POWER_DIOPTERS: 45.75
OS_K1POWER_DIOPTERS: 45.75
OD_K2POWER_DIOPTERS: 46.00
OS_AXISANGLE_DEGREES: 120
OS_K2POWER_DIOPTERS: 46.00

## 2022-05-23 ASSESSMENT — REFRACTION_MANIFEST
OD_VA2: 20/20
OD_ADD: +2.50
OS_VA1: 20/20
OS_AXIS: 090
OD_AXIS: 115
OD_SPHERE: -1.00
OS_SPHERE: +0.50
OU_VA: 20/20
OS_CYLINDER: -1.25
OS_VA2: 20/20
OD_VA1: 20/25-1
OS_ADD: +2.50
OD_CYLINDER: -0.25

## 2022-05-23 ASSESSMENT — SPHEQUIV_DERIVED
OD_SPHEQUIV: -1.125
OS_SPHEQUIV: -0.125
OS_SPHEQUIV: -0.125
OD_SPHEQUIV: -1

## 2022-05-23 ASSESSMENT — CONFRONTATIONAL VISUAL FIELD TEST (CVF)
OD_FINDINGS: FULL
OS_FINDINGS: FULL

## 2022-05-23 ASSESSMENT — VISUAL ACUITY
OS_BCVA: 20/25
OD_BCVA: 20/20-2

## 2022-05-23 ASSESSMENT — LID EXAM ASSESSMENTS
OD_BLEPHARITIS: RLL RUL
OS_BLEPHARITIS: LLL LUL

## 2022-06-27 ENCOUNTER — DOCTOR'S OFFICE (OUTPATIENT)
Dept: URBAN - NONMETROPOLITAN AREA CLINIC 1 | Facility: CLINIC | Age: 82
Setting detail: OPHTHALMOLOGY
End: 2022-06-27
Payer: COMMERCIAL

## 2022-06-27 DIAGNOSIS — H43.812: ICD-10-CM

## 2022-06-27 DIAGNOSIS — H35.371: ICD-10-CM

## 2022-06-27 DIAGNOSIS — H04.122: ICD-10-CM

## 2022-06-27 DIAGNOSIS — H35.81: ICD-10-CM

## 2022-06-27 DIAGNOSIS — H04.121: ICD-10-CM

## 2022-06-27 PROCEDURE — 99213 OFFICE O/P EST LOW 20 MIN: CPT | Performed by: OPHTHALMOLOGY

## 2022-06-27 PROCEDURE — 92134 CPTRZ OPH DX IMG PST SGM RTA: CPT | Performed by: OPHTHALMOLOGY

## 2022-06-27 PROCEDURE — 83861 MICROFLUID ANALY TEARS: CPT | Performed by: OPHTHALMOLOGY

## 2022-06-27 ASSESSMENT — REFRACTION_MANIFEST
OD_CYLINDER: -0.25
OS_CYLINDER: -1.25
OS_VA2: 20/20
OS_SPHERE: +0.50
OS_ADD: +2.50
OD_SPHERE: -1.00
OS_VA1: 20/20
OD_VA1: 20/25-1
OD_AXIS: 115
OS_AXIS: 090
OD_ADD: +2.50
OU_VA: 20/20
OD_VA2: 20/20

## 2022-06-27 ASSESSMENT — REFRACTION_AUTOREFRACTION
OS_AXIS: 89
OS_CYLINDER: -1.25
OD_CYLINDER: 0.00
OS_SPHERE: +0.50
OD_SPHERE: -1.00
OD_AXIS: 180

## 2022-06-27 ASSESSMENT — REFRACTION_CURRENTRX
OD_ADD: +2.50
OD_CYLINDER: -0.25
OS_VPRISM_DIRECTION: PROGS
OD_SPHERE: -0.50
OD_AXIS: 111
OS_ADD: +2.50
OS_AXIS: 86
OS_CYLINDER: -1.50
OD_OVR_VA: 20/
OD_VPRISM_DIRECTION: PROGS
OS_SPHERE: +0.50
OS_OVR_VA: 20/

## 2022-06-27 ASSESSMENT — SPHEQUIV_DERIVED
OS_SPHEQUIV: -0.125
OD_SPHEQUIV: -1.125
OD_SPHEQUIV: -1
OS_SPHEQUIV: -0.125

## 2022-06-27 ASSESSMENT — KERATOMETRY
OS_K2POWER_DIOPTERS: 46.00
OS_AXISANGLE_DEGREES: 120
OS_K1POWER_DIOPTERS: 45.75
OD_AXISANGLE_DEGREES: 011
OD_K2POWER_DIOPTERS: 46.00
OD_K1POWER_DIOPTERS: 45.75

## 2022-06-27 ASSESSMENT — AXIALLENGTH_DERIVED
OD_AL: 23.1657
OD_AL: 23.1188
OS_AL: 22.796
OS_AL: 22.796

## 2022-06-27 ASSESSMENT — CONFRONTATIONAL VISUAL FIELD TEST (CVF)
OD_FINDINGS: FULL
OS_FINDINGS: FULL

## 2022-06-27 ASSESSMENT — LID EXAM ASSESSMENTS
OS_BLEPHARITIS: LLL LUL
OD_BLEPHARITIS: RLL RUL

## 2022-06-27 ASSESSMENT — VISUAL ACUITY
OS_BCVA: 20/25-2
OD_BCVA: 20/20-1

## 2022-07-20 ENCOUNTER — RX ONLY (RX ONLY)
Age: 82
End: 2022-07-20

## 2022-07-20 RX ORDER — LOTEPREDNOL ETABONATE 5 MG/ML
SUSPENSION/ DROPS OPHTHALMIC
Qty: 10 | Refills: 5 | Status: ACTIVE | OUTPATIENT

## 2022-07-25 ENCOUNTER — DOCTOR'S OFFICE (OUTPATIENT)
Dept: URBAN - NONMETROPOLITAN AREA CLINIC 1 | Facility: CLINIC | Age: 82
Setting detail: OPHTHALMOLOGY
End: 2022-07-25
Payer: COMMERCIAL

## 2022-07-25 DIAGNOSIS — H35.81: ICD-10-CM

## 2022-07-25 DIAGNOSIS — H40.1113: ICD-10-CM

## 2022-07-25 DIAGNOSIS — H40.1121: ICD-10-CM

## 2022-07-25 DIAGNOSIS — H43.812: ICD-10-CM

## 2022-07-25 DIAGNOSIS — H35.371: ICD-10-CM

## 2022-07-25 DIAGNOSIS — H04.122: ICD-10-CM

## 2022-07-25 DIAGNOSIS — H04.121: ICD-10-CM

## 2022-07-25 PROCEDURE — 99213 OFFICE O/P EST LOW 20 MIN: CPT | Performed by: OPHTHALMOLOGY

## 2022-07-25 PROCEDURE — 92134 CPTRZ OPH DX IMG PST SGM RTA: CPT | Performed by: OPHTHALMOLOGY

## 2022-07-25 ASSESSMENT — SPHEQUIV_DERIVED
OS_SPHEQUIV: -0.125
OS_SPHEQUIV: -0.125
OD_SPHEQUIV: -1
OD_SPHEQUIV: -1.125

## 2022-07-25 ASSESSMENT — REFRACTION_CURRENTRX
OS_ADD: +2.50
OS_SPHERE: +0.50
OD_OVR_VA: 20/
OS_OVR_VA: 20/
OD_AXIS: 111
OS_VPRISM_DIRECTION: PROGS
OD_VPRISM_DIRECTION: PROGS
OD_CYLINDER: -0.25
OS_CYLINDER: -1.50
OS_AXIS: 86
OD_SPHERE: -0.50
OD_ADD: +2.50

## 2022-07-25 ASSESSMENT — KERATOMETRY
OS_AXISANGLE_DEGREES: 120
OS_K1POWER_DIOPTERS: 45.75
OS_K2POWER_DIOPTERS: 46.00
OD_AXISANGLE_DEGREES: 011
OD_K2POWER_DIOPTERS: 46.00
OD_K1POWER_DIOPTERS: 45.75

## 2022-07-25 ASSESSMENT — REFRACTION_MANIFEST
OS_AXIS: 090
OD_AXIS: 115
OS_SPHERE: +0.50
OU_VA: 20/20
OS_VA2: 20/20
OS_VA1: 20/20
OD_SPHERE: -1.00
OS_CYLINDER: -1.25
OS_ADD: +2.50
OD_CYLINDER: -0.25
OD_VA2: 20/20
OD_ADD: +2.50
OD_VA1: 20/25-1

## 2022-07-25 ASSESSMENT — LID EXAM ASSESSMENTS
OS_BLEPHARITIS: LLL LUL
OD_BLEPHARITIS: RLL RUL

## 2022-07-25 ASSESSMENT — REFRACTION_AUTOREFRACTION
OD_AXIS: 180
OS_SPHERE: +0.50
OD_CYLINDER: 0.00
OD_SPHERE: -1.00
OS_CYLINDER: -1.25
OS_AXIS: 89

## 2022-07-25 ASSESSMENT — CONFRONTATIONAL VISUAL FIELD TEST (CVF)
OD_FINDINGS: FULL
OS_FINDINGS: FULL

## 2022-07-25 ASSESSMENT — VISUAL ACUITY
OD_BCVA: 20/25+2
OS_BCVA: 20/25

## 2022-07-25 ASSESSMENT — AXIALLENGTH_DERIVED
OD_AL: 23.1657
OD_AL: 23.1188
OS_AL: 22.796
OS_AL: 22.796

## 2022-08-01 ENCOUNTER — DOCTOR'S OFFICE (OUTPATIENT)
Dept: URBAN - NONMETROPOLITAN AREA CLINIC 1 | Facility: CLINIC | Age: 82
Setting detail: OPHTHALMOLOGY
End: 2022-08-01
Payer: COMMERCIAL

## 2022-08-01 DIAGNOSIS — H40.1121: ICD-10-CM

## 2022-08-01 DIAGNOSIS — H40.61X3: ICD-10-CM

## 2022-08-01 DIAGNOSIS — H40.1113: ICD-10-CM

## 2022-08-01 PROCEDURE — 99212 OFFICE O/P EST SF 10 MIN: CPT | Performed by: OPHTHALMOLOGY

## 2022-08-01 ASSESSMENT — REFRACTION_AUTOREFRACTION
OD_AXIS: 180
OD_CYLINDER: 0.00
OS_AXIS: 89
OD_SPHERE: -1.00
OS_CYLINDER: -1.25
OS_SPHERE: +0.50

## 2022-08-01 ASSESSMENT — SPHEQUIV_DERIVED
OS_SPHEQUIV: -0.125
OD_SPHEQUIV: -1.125
OS_SPHEQUIV: -0.125
OD_SPHEQUIV: -1

## 2022-08-01 ASSESSMENT — AXIALLENGTH_DERIVED
OD_AL: 23.1657
OD_AL: 23.1188
OS_AL: 22.796
OS_AL: 22.796

## 2022-08-01 ASSESSMENT — REFRACTION_CURRENTRX
OS_AXIS: 86
OD_VPRISM_DIRECTION: PROGS
OS_VPRISM_DIRECTION: PROGS
OD_AXIS: 111
OS_SPHERE: +0.50
OD_ADD: +2.50
OD_CYLINDER: -0.25
OD_SPHERE: -0.50
OS_CYLINDER: -1.50
OD_OVR_VA: 20/
OS_ADD: +2.50
OS_OVR_VA: 20/

## 2022-08-01 ASSESSMENT — REFRACTION_MANIFEST
OS_AXIS: 090
OD_SPHERE: -1.00
OS_VA2: 20/20
OS_VA1: 20/20
OD_VA1: 20/25-1
OD_CYLINDER: -0.25
OD_VA2: 20/20
OU_VA: 20/20
OS_ADD: +2.50
OS_SPHERE: +0.50
OD_AXIS: 115
OD_ADD: +2.50
OS_CYLINDER: -1.25

## 2022-08-01 ASSESSMENT — CONFRONTATIONAL VISUAL FIELD TEST (CVF)
OD_FINDINGS: FULL
OS_FINDINGS: FULL

## 2022-08-01 ASSESSMENT — LID EXAM ASSESSMENTS
OD_BLEPHARITIS: RLL RUL
OS_BLEPHARITIS: LLL LUL

## 2022-08-01 ASSESSMENT — KERATOMETRY
OS_K1POWER_DIOPTERS: 45.75
OS_K2POWER_DIOPTERS: 46.00
OD_K2POWER_DIOPTERS: 46.00
OD_AXISANGLE_DEGREES: 011
OS_AXISANGLE_DEGREES: 120
OD_K1POWER_DIOPTERS: 45.75

## 2022-08-01 ASSESSMENT — VISUAL ACUITY
OS_BCVA: 20/30+1
OD_BCVA: 20/25-2

## 2022-08-06 ENCOUNTER — DOCTOR'S OFFICE (OUTPATIENT)
Dept: URBAN - NONMETROPOLITAN AREA CLINIC 1 | Facility: CLINIC | Age: 82
Setting detail: OPHTHALMOLOGY
End: 2022-08-06
Payer: COMMERCIAL

## 2022-08-06 ENCOUNTER — RX ONLY (RX ONLY)
Age: 82
End: 2022-08-06

## 2022-08-06 DIAGNOSIS — H40.1121: ICD-10-CM

## 2022-08-06 DIAGNOSIS — H40.1113: ICD-10-CM

## 2022-08-06 DIAGNOSIS — H40.61X3: ICD-10-CM

## 2022-08-06 PROCEDURE — 99213 OFFICE O/P EST LOW 20 MIN: CPT | Performed by: OPHTHALMOLOGY

## 2022-08-06 ASSESSMENT — KERATOMETRY
OS_AXISANGLE_DEGREES: 120
OD_K1POWER_DIOPTERS: 45.75
OD_K2POWER_DIOPTERS: 46.00
OS_K2POWER_DIOPTERS: 46.00
OS_K1POWER_DIOPTERS: 45.75
OD_AXISANGLE_DEGREES: 011

## 2022-08-06 ASSESSMENT — REFRACTION_MANIFEST
OS_AXIS: 090
OD_VA1: 20/25-1
OD_ADD: +2.50
OD_SPHERE: -1.00
OD_VA2: 20/20
OS_CYLINDER: -1.25
OU_VA: 20/20
OS_SPHERE: +0.50
OD_AXIS: 115
OS_VA1: 20/20
OS_ADD: +2.50
OD_CYLINDER: -0.25
OS_VA2: 20/20

## 2022-08-06 ASSESSMENT — REFRACTION_CURRENTRX
OS_CYLINDER: -1.50
OD_SPHERE: -0.50
OD_AXIS: 111
OD_CYLINDER: -0.25
OD_OVR_VA: 20/
OS_ADD: +2.50
OD_VPRISM_DIRECTION: PROGS
OD_ADD: +2.50
OS_SPHERE: +0.50
OS_OVR_VA: 20/
OS_AXIS: 86
OS_VPRISM_DIRECTION: PROGS

## 2022-08-06 ASSESSMENT — REFRACTION_AUTOREFRACTION
OD_CYLINDER: 0.00
OS_SPHERE: +0.50
OS_CYLINDER: -1.25
OD_SPHERE: -1.00
OS_AXIS: 89
OD_AXIS: 180

## 2022-08-06 ASSESSMENT — AXIALLENGTH_DERIVED
OD_AL: 23.1657
OS_AL: 22.796
OS_AL: 22.796
OD_AL: 23.1188

## 2022-08-06 ASSESSMENT — VISUAL ACUITY
OD_BCVA: 20/25
OS_BCVA: 20/25+2

## 2022-08-06 ASSESSMENT — CONFRONTATIONAL VISUAL FIELD TEST (CVF)
OS_FINDINGS: FULL
OD_FINDINGS: FULL

## 2022-08-06 ASSESSMENT — SPHEQUIV_DERIVED
OD_SPHEQUIV: -1
OS_SPHEQUIV: -0.125
OS_SPHEQUIV: -0.125
OD_SPHEQUIV: -1.125

## 2022-08-06 ASSESSMENT — LID EXAM ASSESSMENTS
OD_BLEPHARITIS: RLL RUL
OS_BLEPHARITIS: LLL LUL

## 2022-08-22 ENCOUNTER — DOCTOR'S OFFICE (OUTPATIENT)
Dept: URBAN - NONMETROPOLITAN AREA CLINIC 1 | Facility: CLINIC | Age: 82
Setting detail: OPHTHALMOLOGY
End: 2022-08-22
Payer: COMMERCIAL

## 2022-08-22 ENCOUNTER — RX ONLY (RX ONLY)
Age: 82
End: 2022-08-22

## 2022-08-22 DIAGNOSIS — H40.1113: ICD-10-CM

## 2022-08-22 DIAGNOSIS — H40.61X3: ICD-10-CM

## 2022-08-22 DIAGNOSIS — H40.1121: ICD-10-CM

## 2022-08-22 PROCEDURE — 99213 OFFICE O/P EST LOW 20 MIN: CPT | Performed by: OPHTHALMOLOGY

## 2022-08-22 RX ORDER — TIMOLOL MALEATE 5 MG/ML
SOLUTION OPHTHALMIC
Qty: 10 | Refills: 6 | Status: ACTIVE | OUTPATIENT

## 2022-08-22 ASSESSMENT — SPHEQUIV_DERIVED
OD_SPHEQUIV: -1.125
OD_SPHEQUIV: -1
OS_SPHEQUIV: -0.125
OS_SPHEQUIV: -0.125

## 2022-08-22 ASSESSMENT — REFRACTION_AUTOREFRACTION
OS_SPHERE: +0.50
OS_CYLINDER: -1.25
OD_AXIS: 180
OS_AXIS: 89
OD_CYLINDER: 0.00
OD_SPHERE: -1.00

## 2022-08-22 ASSESSMENT — REFRACTION_MANIFEST
OD_ADD: +2.50
OD_CYLINDER: -0.25
OS_VA1: 20/20
OS_VA2: 20/20
OD_AXIS: 115
OD_VA1: 20/25-1
OS_CYLINDER: -1.25
OS_SPHERE: +0.50
OS_AXIS: 090
OD_SPHERE: -1.00
OS_ADD: +2.50
OD_VA2: 20/20
OU_VA: 20/20

## 2022-08-22 ASSESSMENT — REFRACTION_CURRENTRX
OS_OVR_VA: 20/
OS_SPHERE: +0.50
OD_VPRISM_DIRECTION: PROGS
OS_CYLINDER: -1.50
OS_VPRISM_DIRECTION: PROGS
OD_ADD: +2.50
OD_SPHERE: -0.50
OS_ADD: +2.50
OS_AXIS: 86
OD_AXIS: 111
OD_CYLINDER: -0.25
OD_OVR_VA: 20/

## 2022-08-22 ASSESSMENT — CONFRONTATIONAL VISUAL FIELD TEST (CVF)
OS_FINDINGS: FULL
OD_FINDINGS: FULL

## 2022-08-22 ASSESSMENT — AXIALLENGTH_DERIVED
OD_AL: 23.1657
OD_AL: 23.1188
OS_AL: 22.796
OS_AL: 22.796

## 2022-08-22 ASSESSMENT — KERATOMETRY
OD_K1POWER_DIOPTERS: 45.75
OS_AXISANGLE_DEGREES: 120
OS_K1POWER_DIOPTERS: 45.75
OD_K2POWER_DIOPTERS: 46.00
OS_K2POWER_DIOPTERS: 46.00
OD_AXISANGLE_DEGREES: 011

## 2022-08-22 ASSESSMENT — VISUAL ACUITY
OS_BCVA: 20/25-1
OD_BCVA: 20/30-2

## 2022-09-26 ENCOUNTER — DOCTOR'S OFFICE (OUTPATIENT)
Dept: URBAN - NONMETROPOLITAN AREA CLINIC 1 | Facility: CLINIC | Age: 82
Setting detail: OPHTHALMOLOGY
End: 2022-09-26
Payer: COMMERCIAL

## 2022-09-26 ENCOUNTER — OPTICAL OFFICE (OUTPATIENT)
Dept: URBAN - NONMETROPOLITAN AREA CLINIC 4 | Facility: CLINIC | Age: 82
Setting detail: OPHTHALMOLOGY
End: 2022-09-26
Payer: COMMERCIAL

## 2022-09-26 DIAGNOSIS — H52.13: ICD-10-CM

## 2022-09-26 DIAGNOSIS — H52.4: ICD-10-CM

## 2022-09-26 DIAGNOSIS — H52.223: ICD-10-CM

## 2022-09-26 PROCEDURE — V2020 VISION SVCS FRAMES PURCHASES: HCPCS | Performed by: OPTOMETRIST

## 2022-09-26 PROCEDURE — 92012 INTRM OPH EXAM EST PATIENT: CPT | Performed by: OPTOMETRIST

## 2022-09-26 PROCEDURE — V2025 EYEGLASSES DELUX FRAMES: HCPCS | Performed by: OPTOMETRIST

## 2022-09-26 PROCEDURE — V2784 LENS POLYCARB OR EQUAL: HCPCS | Performed by: OPTOMETRIST

## 2022-09-26 PROCEDURE — V2203 LENS SPHCYL BIFOCAL 4.00D/.1: HCPCS | Performed by: OPTOMETRIST

## 2022-09-26 PROCEDURE — V2750 ANTI-REFLECTIVE COATING: HCPCS | Performed by: OPTOMETRIST

## 2022-09-26 PROCEDURE — V2781 PROGRESSIVE LENS PER LENS: HCPCS | Performed by: OPTOMETRIST

## 2022-09-26 ASSESSMENT — SPHEQUIV_DERIVED
OS_SPHEQUIV: -0.375
OD_SPHEQUIV: -1.125
OS_SPHEQUIV: -0.75
OD_SPHEQUIV: -1

## 2022-09-26 ASSESSMENT — KERATOMETRY
OD_K1POWER_DIOPTERS: 45.75
OS_K2POWER_DIOPTERS: 46.00
OS_K1POWER_DIOPTERS: 45.75
OS_AXISANGLE_DEGREES: 120
OD_K2POWER_DIOPTERS: 46.00
OD_AXISANGLE_DEGREES: 011

## 2022-09-26 ASSESSMENT — REFRACTION_CURRENTRX
OS_AXIS: 86
OD_VPRISM_DIRECTION: PROGS
OD_SPHERE: -0.50
OD_OVR_VA: 20/
OD_ADD: +2.50
OS_OVR_VA: 20/
OS_ADD: +2.50
OD_AXIS: 111
OD_CYLINDER: -0.25
OS_VPRISM_DIRECTION: PROGS
OS_CYLINDER: -1.50
OS_SPHERE: +0.50

## 2022-09-26 ASSESSMENT — AXIALLENGTH_DERIVED
OD_AL: 23.1188
OS_AL: 23.0257
OS_AL: 22.8873
OD_AL: 23.1657

## 2022-09-26 ASSESSMENT — REFRACTION_AUTOREFRACTION
OS_SPHERE: -0.25
OD_SPHERE: -1.00
OD_CYLINDER: 0.00
OD_AXIS: 180
OS_AXIS: 093
OS_CYLINDER: -1.00

## 2022-09-26 ASSESSMENT — REFRACTION_MANIFEST
OS_CYLINDER: -1.25
OS_ADD: +2.50
OD_VA1: 20/30-2
OS_VA2: 20/20
OU_VA: 20/20
OD_SPHERE: -1.00
OD_VA2: 20/30-2
OD_CYLINDER: -0.25
OS_VA1: 20/20
OD_AXIS: 115
OS_SPHERE: +0.25
OS_AXIS: 090
OD_ADD: +2.50

## 2022-09-26 ASSESSMENT — VISUAL ACUITY
OS_BCVA: 20/30-2
OD_BCVA: 20/30+2

## 2022-10-03 ENCOUNTER — DOCTOR'S OFFICE (OUTPATIENT)
Dept: URBAN - NONMETROPOLITAN AREA CLINIC 1 | Facility: CLINIC | Age: 82
Setting detail: OPHTHALMOLOGY
End: 2022-10-03
Payer: COMMERCIAL

## 2022-10-03 DIAGNOSIS — H40.61X3: ICD-10-CM

## 2022-10-03 DIAGNOSIS — H40.1113: ICD-10-CM

## 2022-10-03 DIAGNOSIS — H40.1121: ICD-10-CM

## 2022-10-03 DIAGNOSIS — H35.81: ICD-10-CM

## 2022-10-03 DIAGNOSIS — H04.121: ICD-10-CM

## 2022-10-03 DIAGNOSIS — H04.122: ICD-10-CM

## 2022-10-03 DIAGNOSIS — H35.371: ICD-10-CM

## 2022-10-03 PROBLEM — Z96.1: Status: ACTIVE | Noted: 2021-08-23

## 2022-10-03 PROBLEM — H01.001 BLEPHARITIS; RIGHT UPPER LID, RIGHT LOWER LID, LEFT UPPER LID, LEFT LOWER LID: Status: ACTIVE | Noted: 2017-05-09

## 2022-10-03 PROBLEM — H52.13 MYOPIA, ASTIGMATISM, PRESBYOPIA OU: Status: ACTIVE | Noted: 2019-06-17

## 2022-10-03 PROBLEM — H01.002 BLEPHARITIS; RIGHT UPPER LID, RIGHT LOWER LID, LEFT UPPER LID, LEFT LOWER LID: Status: ACTIVE | Noted: 2017-05-09

## 2022-10-03 PROBLEM — H10.503 BLEPHAROCONJUNCTIVITS NOS; BOTH EYES: Status: ACTIVE | Noted: 2020-01-06

## 2022-10-03 PROBLEM — H53.121: Status: ACTIVE | Noted: 2021-08-30

## 2022-10-03 PROBLEM — H52.4 MYOPIA, ASTIGMATISM, PRESBYOPIA OU: Status: ACTIVE | Noted: 2019-06-17

## 2022-10-03 PROBLEM — H01.004 BLEPHARITIS; RIGHT UPPER LID, RIGHT LOWER LID, LEFT UPPER LID, LEFT LOWER LID: Status: ACTIVE | Noted: 2017-05-09

## 2022-10-03 PROBLEM — H01.005 BLEPHARITIS; RIGHT UPPER LID, RIGHT LOWER LID, LEFT UPPER LID, LEFT LOWER LID: Status: ACTIVE | Noted: 2017-05-09

## 2022-10-03 PROBLEM — A69.20: Status: ACTIVE | Noted: 2021-06-28

## 2022-10-03 PROBLEM — H43.812 POSTERIOR VITREOUS DETACHMENT; LEFT EYE: Status: ACTIVE | Noted: 2017-04-03

## 2022-10-03 PROBLEM — H52.223 MYOPIA, ASTIGMATISM, PRESBYOPIA OU: Status: ACTIVE | Noted: 2019-06-17

## 2022-10-03 PROCEDURE — 99213 OFFICE O/P EST LOW 20 MIN: CPT | Performed by: OPHTHALMOLOGY

## 2022-10-03 PROCEDURE — 83861 MICROFLUID ANALY TEARS: CPT | Performed by: OPHTHALMOLOGY

## 2022-10-03 PROCEDURE — 92134 CPTRZ OPH DX IMG PST SGM RTA: CPT | Performed by: OPHTHALMOLOGY

## 2022-10-03 ASSESSMENT — REFRACTION_CURRENTRX
OS_SPHERE: +0.50
OD_SPHERE: -0.50
OD_OVR_VA: 20/
OS_AXIS: 86
OD_CYLINDER: -0.25
OS_ADD: +2.50
OD_VPRISM_DIRECTION: PROGS
OS_VPRISM_DIRECTION: PROGS
OS_OVR_VA: 20/
OD_AXIS: 111
OS_CYLINDER: -1.50
OD_ADD: +2.50

## 2022-10-03 ASSESSMENT — SPHEQUIV_DERIVED
OS_SPHEQUIV: -0.375
OD_SPHEQUIV: -1.125
OS_SPHEQUIV: -0.75
OD_SPHEQUIV: -1

## 2022-10-03 ASSESSMENT — KERATOMETRY
OS_K2POWER_DIOPTERS: 46.00
OD_AXISANGLE_DEGREES: 011
OS_AXISANGLE_DEGREES: 120
OS_K1POWER_DIOPTERS: 45.75
OD_K1POWER_DIOPTERS: 45.75
OD_K2POWER_DIOPTERS: 46.00

## 2022-10-03 ASSESSMENT — REFRACTION_AUTOREFRACTION
OS_CYLINDER: -1.00
OD_SPHERE: -1.00
OD_AXIS: 180
OD_CYLINDER: 0.00
OS_SPHERE: -0.25
OS_AXIS: 093

## 2022-10-03 ASSESSMENT — VISUAL ACUITY
OS_BCVA: 20/25-2
OD_BCVA: 20/25

## 2022-10-03 ASSESSMENT — REFRACTION_MANIFEST
OS_SPHERE: +0.25
OS_VA2: 20/20
OD_AXIS: 115
OS_VA1: 20/20
OD_VA2: 20/30-2
OU_VA: 20/20
OD_ADD: +2.50
OS_ADD: +2.50
OD_SPHERE: -1.00
OD_VA1: 20/30-2
OS_CYLINDER: -1.25
OS_AXIS: 090
OD_CYLINDER: -0.25

## 2022-10-03 ASSESSMENT — LID EXAM ASSESSMENTS
OD_BLEPHARITIS: RLL RUL
OS_BLEPHARITIS: LLL LUL

## 2022-10-03 ASSESSMENT — AXIALLENGTH_DERIVED
OS_AL: 23.0257
OD_AL: 23.1188
OD_AL: 23.1657
OS_AL: 22.8873

## 2022-10-03 ASSESSMENT — PACHYMETRY
OD_CT_UM: 553
OD_CT_CORRECTION: -1

## 2022-10-03 ASSESSMENT — CONFRONTATIONAL VISUAL FIELD TEST (CVF)
OD_FINDINGS: FULL
OS_FINDINGS: FULL

## 2022-10-10 ENCOUNTER — DOCTOR'S OFFICE (OUTPATIENT)
Dept: URBAN - NONMETROPOLITAN AREA CLINIC 1 | Facility: CLINIC | Age: 82
Setting detail: OPHTHALMOLOGY
End: 2022-10-10
Payer: COMMERCIAL

## 2022-10-10 DIAGNOSIS — H40.1113: ICD-10-CM

## 2022-10-10 PROCEDURE — 92083 EXTENDED VISUAL FIELD XM: CPT | Performed by: OPHTHALMOLOGY

## 2022-10-19 ENCOUNTER — OFFICE VISIT (OUTPATIENT)
Dept: PSYCHIATRY | Facility: CLINIC | Age: 82
End: 2022-10-19
Payer: MEDICARE

## 2022-10-19 ENCOUNTER — SOCIAL WORK (OUTPATIENT)
Dept: BEHAVIORAL/MENTAL HEALTH CLINIC | Facility: CLINIC | Age: 82
End: 2022-10-19

## 2022-10-19 DIAGNOSIS — F41.1 GAD (GENERALIZED ANXIETY DISORDER): Primary | ICD-10-CM

## 2022-10-19 DIAGNOSIS — F41.9 ANXIETY DISORDER, UNSPECIFIED TYPE: Primary | ICD-10-CM

## 2022-10-19 DIAGNOSIS — F33.0 MAJOR DEPRESSIVE DISORDER, RECURRENT, MILD (HCC): ICD-10-CM

## 2022-10-19 DIAGNOSIS — G47.00 INSOMNIA, UNSPECIFIED TYPE: ICD-10-CM

## 2022-10-19 DIAGNOSIS — F32.A DEPRESSION, UNSPECIFIED DEPRESSION TYPE: ICD-10-CM

## 2022-10-19 PROCEDURE — 90792 PSYCH DIAG EVAL W/MED SRVCS: CPT | Performed by: STUDENT IN AN ORGANIZED HEALTH CARE EDUCATION/TRAINING PROGRAM

## 2022-10-19 RX ORDER — ZOLPIDEM TARTRATE 5 MG/1
TABLET ORAL
COMMUNITY
Start: 2022-09-22 | End: 2022-10-19 | Stop reason: ALTCHOICE

## 2022-10-19 RX ORDER — LOTEPREDNOL ETABONATE 5 MG/ML
SUSPENSION/ DROPS OPHTHALMIC
COMMUNITY
Start: 2022-07-18

## 2022-10-19 RX ORDER — ATORVASTATIN CALCIUM 40 MG/1
1 TABLET, FILM COATED ORAL DAILY
COMMUNITY
Start: 2021-11-17

## 2022-10-19 RX ORDER — CLONAZEPAM 0.12 MG/1
TABLET, ORALLY DISINTEGRATING ORAL
Qty: 75 TABLET | Refills: 0 | Status: SHIPPED | OUTPATIENT
Start: 2022-10-19

## 2022-10-19 RX ORDER — TIMOLOL MALEATE 5 MG/ML
SOLUTION/ DROPS OPHTHALMIC
COMMUNITY
Start: 2022-08-22

## 2022-10-19 RX ORDER — ESCITALOPRAM OXALATE 5 MG/1
5 TABLET ORAL
Qty: 30 TABLET | Refills: 0 | Status: SHIPPED | OUTPATIENT
Start: 2022-10-19 | End: 2022-11-18

## 2022-10-19 NOTE — PROGRESS NOTES
Assessment      Crisis Intake  Patient Intake  Special Needs: None  Living Arrangement: Lives with someone (Spouse)  Can patient return home?: Yes  Address to be Discharge to[de-identified] see franklyn  Patient's Telephone Number: see demos  Type of Work: Owned a restaurant  Work History: Retired  Admission C/ Rocky P[address] of Residence: [address] [place] [address]  Patient History  Presenting Problems: The patient presented to the [address] [place] [address] with his daughter due to an increase in anxiety and depression  The patient has been prescribed klonopin for the last 15 years  He often would take 0 25mg in the morning and before bed  Recently the dose has not been therapuetic  His anxiety persists despite the same dosage of klonopin  He also is experiencing depression at times, which he has never experienced in the past   The patient and his daughter have been attempting to schedule an upcoming appointment with a psychiatrist, however, the appointments are far in the future  The patient first began taking the klonopin 15 years ago after his daughter was going through a custody wall in [place]  There was much stress during the custody dispute, which triggered the patient's anxiety  His wife is supportive, but often tells him that she does not understand why he is depressed and anxious  The patient stated that he is a 'worrier' and has been preoccupied with his medical concerns  The patient denied suicidal and homicidal ideation  No psychosis is present  The patient would like to see a psychiatrist today and be maintained on psychiatric medications  Treatment History: outpatient treatment with psychiatrist 15 years ago  Currently in Treatment: No  Medical Problems: see H&P  Legal Issues: none  Substance Abuse: No  Mental Status Exam  Orientation Level: Oriented X4  Affect: Appropriate  Speech: Logical/coherent  Mood: Anxious  Thought Content: Appropriate  Hallucination Type: No problems reported or observed    Judgement: Good  Impulse Control: Good  Attention Span: Appropriate for age  Memory: Intact  Appetite: Good  Sleep: Poor  Total Hours of Sleep: 5 - 6  Specific Sleep Problem: difficulty staying asleep, must take medication  Appear/Hygiene: Neatly Groomed  ADL Comments: No problems completing his ADLs  Strengths and Limitations  Patient Strengths: Motivated, Financially secure, Cooperative, Resourceful, Good support system, Family ties, Negotiates basic needs, Interpersonal skills, Sense of humor, Reasoning ability  Patient Limitations: Difficulty adapting  Ideations  Current Self Harm/Suicidal Ideation: No  Previous Self Harm/Suicidal Ideation: No  Violence Risk to Self: Denies ideation within past 6 months  Current homicidal or violent thoughts toward another: Denies ideations within past 6 months  Previous Plans to Harm Another Person: No  Violence Risk to Others: Denies within past 6 months  Previous History of Violence to Others: No  Provisional Diagnosis  Axis I: CHARISMA  Axis II: def  Axis III: see H&P  Intake Assessment Outcome  Patient Plan: Outpatient    C-SSRS  [place] Suicide Severity Rating Scale  C-SSRS Q1  Wish to be Dead: No - In the Past Month  *Risk Level*: Low Risk      Patient was referred by: Self or Family        Discharge and Safety    This writer discussed the patients current presentation and recommended discharge plan with the patient and his daughter  They agree with the patient being discharged at this time with outpatient psychiatry  This writer and the patient completed a safety plan   The patient was provided with a copy of their safety plan with encouragement to utilize the plan following discharge  In addition, the patient was instructed to call local [facility] crisis, other crisis services, 911 or to go to the nearest ER immediately if their situation changes at any time       This writer discussed discharge plans with the patient and family who agree with and understands the discharge plans      SAFETY PLAN  Warning Signs (thoughts, images, mood, behavior, situations) of a potential crisis: increase in isolation      Coping Skills (what can I do to take my mind off the problem, or to keep myself safe): go to the gym, walk the dog      Outside Support (who can I reach out to for support and help): family, friends        National Suicide Prevention Hotline:  8-861.715.8772    [facility] [address]: Critical access hospital: [place] [phone] [facility] [address] [phone] - Crisis   [phone] - Peer Support Talk Line (1-9pm daily)  [phone] - Teen Support Talk Line (1-9pm daily)  [address] [facility] ([address] [place] [address]) [phone] - [facility]
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
## 2022-10-19 NOTE — BH TREATMENT PLAN
TREATMENT PLAN (Medication Management Only)        [employer]    Name and Date of Birth:  Angelita Franklin [de-identified] y o  1940  Date of Treatment Plan: October 19, 2022  Diagnosis/Diagnoses:    1  CHARISMA (generalized anxiety disorder)    2  Major depressive disorder, recurrent, mild ([facility] [address] )    3  Insomnia, unspecified type      Strengths/Personal Resources for Self-Care: supportive family, supportive friends, taking medications as prescribed, ability to adapt to life changes, ability to communicate needs, ability to communicate well, ability to listen, ability to reason, ability to understand psychiatric illness, average or above intelligence, family ties, financial means, financial security, general fund of knowledge, good physical health, good understanding of illness, independence, motivation for treatment, ability to negotiate basic needs, Protestant affiliation, being resoureceful, self-reliance, sense of humor, special hobby/interest, stable employment, strong sung, well educated, willingness to work on problems, work skills  Area/Areas of need (in own words): anxiety symptoms, depressive symptoms  1  Long Term Goal: maintain acceptable anxiety level  Target Date:6 months - 4/19/2023  Person/Persons responsible for completion of goal: Madelyn Rhoades  2  Short Term Objective (s) - How will we reach this goal?:   A  Provider new recommended medication/dosage changes and/or continue medication(s): Medication changes: I have discontinued Amish Pulido's clonazePAM and zolpidem  I am also having him start on clonazePAM, escitalopram, and Melatonin  Additionally, I am having him maintain his atorvastatin, fluticasone, Dorzolamide HCl-Timolol Mal PF, ibuprofen, Vyzulta, omeprazole, Latanoprostene Bunod, timolol, atorvastatin, and loteprednol etabonate     B  Avoid alcohol   C  Attend medication management appointments regularly    Target Date:6 months - 4/19/2023  Person/Persons Responsible for Completion of Goal: Amish  Progress Towards Goals: starting treatment  Treatment Modality: medication management with psychotherapy every 4 weeks  Review due 180 days from date of this plan: 6 months - 4/19/2023  Expected length of service: maintenance  My Physician/PA/NP and I have developed this plan together and I agree to work on the goals and objectives  I understand the treatment goals that were developed for my treatment

## 2022-10-20 ENCOUNTER — TELEPHONE (OUTPATIENT)
Dept: PSYCHIATRY | Facility: CLINIC | Age: 82
End: 2022-10-20

## 2022-10-20 NOTE — TELEPHONE ENCOUNTER
LM on Amish's VM that I was returning his call regarding Melatonin  Informed him that he can go as high as 10 MG  Also LM that he should take it every night for several days to see if it is working  Nursing number provided for return call if he has any questions  Doneen Miko returned my call  States that he took two 5 MG tablets of Melatonin  Instructed him not to go any higher than that and to try it for several days to see if it works for him  He also informed me that he woke up with some increased anxiety and a little pressure in the back of his head  Wanted to know if this can be from the Melatonin or from decreasing the Klonopin  Told him decreasing the Klonopin as well as just the thought that he is decreasing his Klonopin can be contributing but instructed him to continue to monitor and call the office if it continues or if he has any further concerns  He also questioned if the depression will improve after he takes the lexapro  Informed him that Lexapro can take several weeks for him to reach therapeutic level  Informed him that this will be reviewed at next appointment and provider will adjust dose if necessary  Will refer to Dr Josselyn Alexander for review

## 2022-10-20 NOTE — TELEPHONE ENCOUNTER
Henry Condon called  He took melatonin last night 5mg  He only got about 4 hours sleep  He wants to know if this will increase as he is taking it?

## 2022-10-21 ENCOUNTER — DOCTOR'S OFFICE (OUTPATIENT)
Dept: URBAN - NONMETROPOLITAN AREA CLINIC 1 | Facility: CLINIC | Age: 82
Setting detail: OPHTHALMOLOGY
End: 2022-10-21
Payer: COMMERCIAL

## 2022-10-21 DIAGNOSIS — H35.371: ICD-10-CM

## 2022-10-21 DIAGNOSIS — H40.1121: ICD-10-CM

## 2022-10-21 DIAGNOSIS — H40.1113: ICD-10-CM

## 2022-10-21 DIAGNOSIS — H40.61X3: ICD-10-CM

## 2022-10-21 PROCEDURE — 76514 ECHO EXAM OF EYE THICKNESS: CPT | Performed by: OPHTHALMOLOGY

## 2022-10-21 PROCEDURE — 92133 CPTRZD OPH DX IMG PST SGM ON: CPT | Performed by: OPHTHALMOLOGY

## 2022-10-21 PROCEDURE — 99213 OFFICE O/P EST LOW 20 MIN: CPT | Performed by: OPHTHALMOLOGY

## 2022-10-21 ASSESSMENT — REFRACTION_CURRENTRX
OD_AXIS: 111
OS_ADD: +2.50
OD_SPHERE: -0.50
OS_CYLINDER: -1.50
OD_CYLINDER: -0.25
OS_SPHERE: +0.50
OS_OVR_VA: 20/
OD_OVR_VA: 20/
OD_ADD: +2.50
OD_VPRISM_DIRECTION: PROGS
OS_VPRISM_DIRECTION: PROGS
OS_AXIS: 86

## 2022-10-21 ASSESSMENT — REFRACTION_AUTOREFRACTION
OD_CYLINDER: 0.00
OD_AXIS: 180
OS_SPHERE: -0.25
OD_SPHERE: -1.00
OS_CYLINDER: -1.00
OS_AXIS: 093

## 2022-10-21 ASSESSMENT — REFRACTION_MANIFEST
OD_CYLINDER: -0.25
OS_AXIS: 090
OS_SPHERE: +0.25
OS_VA1: 20/20
OD_VA2: 20/30-2
OD_AXIS: 115
OS_VA2: 20/20
OD_SPHERE: -1.00
OS_CYLINDER: -1.25
OU_VA: 20/20
OD_ADD: +2.50
OS_ADD: +2.50
OD_VA1: 20/30-2

## 2022-10-21 ASSESSMENT — KERATOMETRY
OS_K2POWER_DIOPTERS: 46.00
OD_K1POWER_DIOPTERS: 45.75
OD_K2POWER_DIOPTERS: 46.00
OS_K1POWER_DIOPTERS: 45.75
OS_AXISANGLE_DEGREES: 120
OD_AXISANGLE_DEGREES: 011

## 2022-10-21 ASSESSMENT — SPHEQUIV_DERIVED
OD_SPHEQUIV: -1
OD_SPHEQUIV: -1.125
OS_SPHEQUIV: -0.375
OS_SPHEQUIV: -0.75

## 2022-10-21 ASSESSMENT — AXIALLENGTH_DERIVED
OD_AL: 23.1657
OS_AL: 23.0257
OS_AL: 22.8873
OD_AL: 23.1188

## 2022-10-21 ASSESSMENT — LID EXAM ASSESSMENTS
OD_BLEPHARITIS: RLL RUL
OS_BLEPHARITIS: LLL LUL

## 2022-10-21 ASSESSMENT — TONOMETRY: OD_IOP_MMHG: 15

## 2022-10-21 ASSESSMENT — PACHYMETRY
OS_CT_UM: 546
OD_CT_CORRECTION: -1
OD_CT_UM: 553

## 2022-10-21 ASSESSMENT — VISUAL ACUITY
OS_BCVA: 20/25-1
OD_BCVA: 20/25-1

## 2022-10-24 ENCOUNTER — TELEPHONE (OUTPATIENT)
Dept: PSYCHIATRY | Facility: CLINIC | Age: 82
End: 2022-10-24

## 2022-10-24 DIAGNOSIS — G47.00 INSOMNIA, UNSPECIFIED TYPE: Primary | ICD-10-CM

## 2022-10-24 RX ORDER — RAMELTEON 8 MG/1
8 TABLET ORAL
Qty: 30 TABLET | Refills: 0 | Status: SHIPPED | OUTPATIENT
Start: 2022-10-24 | End: 2022-11-23

## 2022-10-24 NOTE — PROGRESS NOTES
Spoke with patient's daughter who shared information about patient's insomnia and that melatonin is not effective  I asked to sc melatonin and will start on Ramelton  Also asked her to give him Lexapro earlier in the morning to avoid insomnia

## 2022-10-26 ENCOUNTER — TELEPHONE (OUTPATIENT)
Dept: OTHER | Facility: OTHER | Age: 82
End: 2022-10-26

## 2022-10-26 ENCOUNTER — TELEPHONE (OUTPATIENT)
Dept: PSYCHIATRY | Facility: CLINIC | Age: 82
End: 2022-10-26

## 2022-10-26 NOTE — TELEPHONE ENCOUNTER
Patient is having some difficultly with his medication  Very nauseous, feeling more depressed, not able to sleep  The wife was looking for some help  They were going to try and find a clinic where he stay and get some sleep   Looking also for a addiction Clinac due to being taken off lorazepam

## 2022-10-26 NOTE — TELEPHONE ENCOUNTER
LM on VM with nursing number requesting Isra Lopez call me back for further discussion  Pao't with Dr Radha Guerra tomorrow

## 2022-10-26 NOTE — TELEPHONE ENCOUNTER
Pt called, requesting a jordi back from provider at [facility] regarding new medication that was prescribed, ramelton, is not helping him sleep   Please assit

## 2022-10-27 ENCOUNTER — OFFICE VISIT (OUTPATIENT)
Dept: PSYCHIATRY | Facility: CLINIC | Age: 82
End: 2022-10-27

## 2022-10-27 DIAGNOSIS — G47.00 INSOMNIA, UNSPECIFIED TYPE: Primary | ICD-10-CM

## 2022-10-27 DIAGNOSIS — F33.0 MAJOR DEPRESSIVE DISORDER, RECURRENT, MILD (HCC): ICD-10-CM

## 2022-10-27 DIAGNOSIS — F41.1 GAD (GENERALIZED ANXIETY DISORDER): ICD-10-CM

## 2022-10-27 RX ORDER — ACETAMINOPHEN,DIPHENHYDRAMINE HCL 500; 25 MG/1; MG/1
1 TABLET, FILM COATED ORAL
COMMUNITY

## 2022-10-27 NOTE — PSYCH
MEDICATION MANAGEMENT NOTE        [facility]      Name and Date of Birth:  Jasbir Gonzalez [de-identified] y o  1940 MRN: [id number]    Date of Visit: October 27, 2022    Reason for Visit:   Chief Complaint   Patient presents with   • Follow-up       SUBJECTIVE:    Tim Lesches is seen today for a follow up for Generalized Anxiety Disorder and insomnia  He continues to do relatively well since the last visit  He reports that on and off anxiety symptoms have been fairly stable, reports wosening insomnia  Mr  Tim Lesches was seen in the last week for psychiatric evaluation for management of his insomnia and anxiety  He was started on Lexapro 5 mg, melatonin at bedtime for insomnia and Klonopin was decreased down to 0 125 3 times a day  He reports improvement of his anxiety however he also reports worsening of his insomnia  His daughter as well as his wife called the office few times in the last week and requesting medication changes for sleep and I prescribed ramelteon for him  Took ramelteon for today's however still unable to sleep at night  He reports the most effective for his insomnia was Tylenol p m  in the last 3 years  He reports staying in bed even he is unable to sleep and also reports taking morning and afternoon naps  To also has multiple clocks in his room  He tries to exercise in the morning and rest at night  He also changed his TV watching content and stopped watching news at night  He denies any suicidal ideation, intent or plan at present; denies any homicidal ideation, intent or plan at present  He denies any auditory hallucinations, denies any visual hallucinations, denies any overt delusions  He reports Diarrhea and loose stools after starting Lexapro    Have    HPI ROS Appetite Changes and Sleep:     He reports normal sleep, normal appetite, normal energy level      Review Of Systems:      Constitutional negative   ENT negative   Cardiovascular negative Respiratory negative   Gastrointestinal negative   Genitourinary negative   Musculoskeletal negative   Integumentary negative   Neurological negative   Endocrine negative   Other Symptoms none, all other systems are negative         Past Medical History:    Past Medical History:   Diagnosis Date   • GERD (gastroesophageal reflux disease)    • High cholesterol         Past Surgical History:   Procedure Laterality Date   • EYE SURGERY     • HERNIA REPAIR       Allergies   Allergen Reactions   • Cat Hair Extract Cough   • Horse-Derived Products Cough   • Pollen Extract Cough       Substance Abuse History:    Social History     Substance and Sexual Activity   Alcohol Use Not Currently     Social History     Substance and Sexual Activity   Drug Use Never       Social History:    Social History     Socioeconomic History   • Marital status: Unknown     Spouse name: Not on file   • Number of children: Not on file   • Years of education: Not on file   • Highest education level: Not on file   Occupational History   • Not on file   Tobacco Use   • Smoking status: Never Smoker   • Smokeless tobacco: Never Used   Substance and Sexual Activity   • Alcohol use: Not Currently   • Drug use: Never   • Sexual activity: Not Currently   Other Topics Concern   • Not on file   Social History Narrative   • Not on file     Social Determinants of Health     Financial Resource Strain: Medium Risk   • Difficulty of Paying Living Expenses: Somewhat hard   Food Insecurity: Not on file   Transportation Needs: Not on file   Physical Activity: Not on file   Stress: Not on file   Social Connections: Not on file   Intimate Partner Violence: Not on file   Housing Stability: Not on file       Family Psychiatric History:     Family History   Problem Relation Age of Onset   • Completed Suicide  Mother    • Cancer Father        History Review:  The following portions of the patient's history were reviewed and updated as appropriate: allergies, current medications, past family history, past medical history, past social history, past surgical history and problem list          OBJECTIVE:     Vital signs in last 24 hours: There were no vitals filed for this visit      Mental Status Evaluation:    Appearance age appropriate, casually dressed   Behavior cooperative, calm   Speech normal rate, normal volume, normal pitch   Mood anxious   Affect normal range and intensity, appropriate   Thought Processes organized, goal directed   Associations intact associations   Thought Content no overt delusions   Perceptual Disturbances: no auditory hallucinations, no visual hallucinations   Abnormal Thoughts  Risk Potential Suicidal ideation - None  Homicidal ideation - None  Potential for aggression - No   Orientation oriented to person, place, time/date and situation   Memory recent and remote memory grossly intact   Consciousness alert and awake   Attention Span Concentration Span attention span and concentration are age appropriate   Intellect appears to be of average intelligence   Insight intact   Judgement intact   Muscle Strength and  Gait normal muscle strength and normal muscle tone, normal gait and normal balance   Motor activity no abnormal movements   Language no difficulty naming common objects, no difficulty repeating a phrase, no difficulty writing a sentence   Fund of Knowledge adequate knowledge of current events  adequate fund of knowledge regarding past history  adequate fund of knowledge regarding vocabulary    Pain none   Pain Scale 0       Laboratory Results: I have personally reviewed all pertinent laboratory/tests results    Most Recent Labs: No results found for: WBC, RBC, HGB, HCT, PLT, RDW, NEUTROABS, NA, K, CL, CO2, BUN, CREATININE, GLUCOSE, CALCIUM, AST, ALT, ALKPHOS, PROT, BILITOT, CHOL, CHOLESTEROL, TRIG, HDL, LDLCALC, NONHDLC, VALPROICTOT, CARBAMAZEPIN, LITHIUM, AMMONIA, [id number], FREET4, T3FREE, PREGTESTUR, PREGSERUM, HCG, HCGQUANT, RPR    Suicide/Homicide Risk Assessment:    Risk of Harm to Self:  The following ratings are based on assessment at the time of the interview  Based on today's assessment, Tim Lesches presents the following risk of harm to self: minimal    Risk of Harm to Others: The following ratings are based on assessment at the time of the interview  Based on today's assessment, Tim Lesches presents the following risk of harm to others: minimal    The following interventions are recommended: no intervention changes needed    Assessment/Plan:  Jasbir Gonzalez is [de-identified]years old adult male patient was seen today for her scheduled follow-up for management of his insomnia that has been worsening in the last week after stopping his Tylenol p m  Tylenol p m  contains diphenhydramine which is anticholinergic medication and we are trying to avoid to avoid worsening of his memory  I discussed with patient that he can use Tylenol p m  1 tablet is safe to combination was ramelteon for few days until he can get the new medication Belsomra approved  Patient reported that ramelteon caused him 50 dollars out of pocket discount coupon and not sure if Belsomra would be covered by his insurance or not  Will continue Lexapro and making good as early as possible in the morning to avoid insomnia from the medication  Patient is well tolerating the Klonopin taper and we plan to decrease Klonopin down to 0 125 twice a day which will be 50 % decrease in the total Klonopin daily dose  Discussed with patient today sleep hygiene and asked him to remove blocks from his room and not to stay in bed after 20 minutes if he did not fall asleep  Also asked him to place exercise in the morning and not late in the afternoon to avoid muscle pain during sleep time  Patient had a sleep study done early in October but results are not back yet  He had Sleep Medicine appointment scheduled in November 22nd which would be few days after the next follow-up with me         Diagnoses and all orders for this visit:    Insomnia, unspecified type  -     Suvorexant 5 MG TABS; Take 1 tablet (5 mg total) by mouth daily at bedtime for 21 days    CHARISMA (generalized anxiety disorder)    Major depressive disorder, recurrent, mild (HCC)          Treatment Recommendations/Precautions:      Medication changes: I have discontinued Amish Pulido's Melatonin  I am also having him start on Suvorexant  Additionally, I am having him maintain his fluticasone, Dorzolamide HCl-Timolol Mal PF, ibuprofen, Vyzulta, omeprazole, Latanoprostene Bunod, timolol, atorvastatin, loteprednol etabonate, clonazePAM, escitalopram, and ramelteon      Current Outpatient Medications:   •  atorvastatin (LIPITOR) 40 mg tablet, Take 1 tablet by mouth daily, Disp: , Rfl:   •  clonazePAM (KlonoPIN) 0 125 mg disintegrating tablet, Use one tablet 3 times day for 2 weeks then twice daily for two weeks, Disp: 75 tablet, Rfl: 0  •  Dorzolamide HCl-Timolol Mal PF 22 3-6 8 MG/ML SOLN, Apply 1 drop to eye 2 (two) times a day, Disp: , Rfl:   •  escitalopram (LEXAPRO) 5 mg tablet, Take 1 tablet (5 mg total) by mouth daily after lunch, Disp: 30 tablet, Rfl: 0  •  Latanoprostene Bunod 0 024 % SOLN, 1 drop both eyes at bedtime, Disp: , Rfl:   •  loteprednol etabonate (LOTEMAX) 0 5 % ophthalmic suspension, INSTILL 1 DROP INTO RIGHT EYE 4 TIMES A DAY, Disp: , Rfl:   •  omeprazole (PriLOSEC) 40 MG capsule, , Disp: , Rfl:   •  ramelteon (ROZEREM) 8 mg tablet, Take 1 tablet (8 mg total) by mouth daily at bedtime, Disp: 30 tablet, Rfl: 0  •  Suvorexant 5 MG TABS, Take 1 tablet (5 mg total) by mouth daily at bedtime for 21 days, Disp: 21 tablet, Rfl: 0  •  timolol (TIMOPTIC) 0 5 % ophthalmic solution, INSTILL ONE DROP INTO BOTH EYES TWICE DAILY, Disp: , Rfl:   •  Vyzulta 0 024 % SOLN, , Disp: , Rfl:   •  fluticasone (FLONASE) 50 mcg/act nasal spray, 2 sprays into each nostril daily, Disp: , Rfl:   •  ibuprofen (MOTRIN) 600 mg tablet, Take 600 mg by mouth every 6 (six) hours as needed, Disp: , Rfl:   Isra Lopez has a current medication list which includes the following prescription(s): atorvastatin, clonazepam, dorzolamide hcl-timolol mal pf, escitalopram, latanoprostene bunod, loteprednol etabonate, omeprazole, ramelteon, suvorexant, timolol, vyzulta, fluticasone, and ibuprofen  Aware of 24 hour and weekend coverage for urgent situations accessed by calling [facility] main practice number    Medications Risks/Benefits      Risks, Benefits And Possible Side Effects Of Medications:    Risks, benefits, and possible side effects of medications explained to Isra Lopez and he verbalizes understanding and agreement for treatment  Controlled Medication Discussion:     Isra Lopez has been filling controlled prescriptions on time as prescribed according to [address] [place] [address] [employer]    Psychotherapy Provided:     Individual psychotherapy provided: Yes  Counseling was provided during the session today for 16 minutes  Medications, treatment progress and treatment plan reviewed with Isra Lopez  Medication changes discussed with Isra Lopez  Medication education provided to Isra Lopez  Importance of medication and treatment compliance reviewed with Isra Lopez  Educated on importance of medication and treatment compliance  Importance of follow up with family physician for medical issues reviewed with Isra Lopez  Discussed with Isra Lopez acceptance of mental illness diagnosis and need for ongoing psychiatric treatment  Importance of follow up for substance abuse issues discussed with Isra Lopez  Supportive therapy provided  Cognitive therapy was utilized during the session  Reassurance and supportive therapy provided  Reoriented to reality and reassured  Treatment Plan:    Completed and signed during the session: Not applicable - Treatment Plan not due at this session    Note Share:     This note was not shared with the patient due to reasonable likelihood of causing patient harm    Visit start and stop times:    Start Time: 10:00 AM  Stop Time: 10:30 AM    I spent 30 minutes directly with the patient during this visit    Valerie Cortes MD 10/27/22

## 2022-11-02 ENCOUNTER — TELEPHONE (OUTPATIENT)
Dept: PSYCHIATRY | Facility: CLINIC | Age: 82
End: 2022-11-02

## 2022-11-02 NOTE — TELEPHONE ENCOUNTER
Returned Amish's call  Edd Marciano states that he is taking his Lexapro after breakfast   Shortly after taking it, he gets a sick stomach  States that initially he had diarrhea but that has resolved  He now just feels sick  This discomfort will last until around lunch time  States he needs to take this early in the day or else he will not sleep at night  He is requesting recommendation  I did inform him that he may need more time to adjust to the medication  Will refer to Dr Gladis Wilder for review

## 2022-11-02 NOTE — TELEPHONE ENCOUNTER
Patients daughter called stated her father has an upset stomach, patient and family are unsure on what maybe causing it  Patient would like a call back

## 2022-11-10 ENCOUNTER — APPOINTMENT (OUTPATIENT)
Dept: RADIOLOGY | Facility: CLINIC | Age: 82
End: 2022-11-10

## 2022-11-10 ENCOUNTER — OFFICE VISIT (OUTPATIENT)
Dept: URGENT CARE | Facility: CLINIC | Age: 82
End: 2022-11-10

## 2022-11-10 VITALS
BODY MASS INDEX: 25.05 KG/M2 | OXYGEN SATURATION: 97 % | SYSTOLIC BLOOD PRESSURE: 169 MMHG | TEMPERATURE: 96.1 F | HEIGHT: 70 IN | DIASTOLIC BLOOD PRESSURE: 73 MMHG | WEIGHT: 175 LBS | HEART RATE: 51 BPM | RESPIRATION RATE: 18 BRPM

## 2022-11-10 DIAGNOSIS — R07.89 CHEST TIGHTNESS: ICD-10-CM

## 2022-11-10 DIAGNOSIS — J01.00 ACUTE NON-RECURRENT MAXILLARY SINUSITIS: ICD-10-CM

## 2022-11-10 DIAGNOSIS — J01.00 ACUTE NON-RECURRENT MAXILLARY SINUSITIS: Primary | ICD-10-CM

## 2022-11-10 RX ORDER — ESOMEPRAZOLE MAGNESIUM 40 MG/1
40 CAPSULE, DELAYED RELEASE ORAL
COMMUNITY
Start: 2022-03-28 | End: 2023-03-28

## 2022-11-10 RX ORDER — AMOXICILLIN AND CLAVULANATE POTASSIUM 875; 125 MG/1; MG/1
1 TABLET, FILM COATED ORAL EVERY 12 HOURS SCHEDULED
Qty: 14 TABLET | Refills: 0 | Status: SHIPPED | OUTPATIENT
Start: 2022-11-10 | End: 2022-11-10

## 2022-11-10 RX ORDER — AMOXICILLIN AND CLAVULANATE POTASSIUM 875; 125 MG/1; MG/1
1 TABLET, FILM COATED ORAL EVERY 12 HOURS SCHEDULED
Qty: 14 TABLET | Refills: 0 | Status: SHIPPED | COMMUNITY
Start: 2022-11-10 | End: 2022-11-17

## 2022-11-10 NOTE — PROGRESS NOTES
[facility]        NAME: Jess Stahl is a [de-identified] y o  male  : 1940    MRN: [id number]  DATE: November 10, 2022  TIME: 10:15 AM    Assessment and Plan   Acute non-recurrent maxillary sinusitis [J01 00]  1  Acute non-recurrent maxillary sinusitis  XR chest pa & lateral    amoxicillin-clavulanate (AUGMENTIN) 875-125 mg per tablet    DISCONTINUED: amoxicillin-clavulanate (AUGMENTIN) 875-125 mg per tablet    DISCONTINUED: amoxicillin-clavulanate (AUGMENTIN) 875-125 mg per tablet   2  Chest tightness           Patient Instructions   Take antibiotic as prescribed  Complete full dose even if symptoms began to improve  Take Zyrtec  Flonase  Drink plenty of fluids  Take deep breaths  Follow up with PCP in 3-5 days  Proceed to  ER if symptoms worsen  Chief Complaint     Chief Complaint   Patient presents with   • Sinus Congestion     Headaches, left blocked ear feeling, left sided nasal congestion, post-nasal drip, and pt states he has a little wheeze when taking a deep breath; symptoms started 7 days ago         History of Present Illness       Patient is an 72-year-old male with significant past medical history of hypertension and glaucoma presents the office complaining of headache, left ear fullness, left-sided nasal congestion, and postnasal drip for 1 week  Reports his chest feels tight and has some wheezing when lying down  Denies fevers, chills, cough, chest pain, shortness of breath, nausea, vomiting, abdominal pain, or rashes  States he does have seasonal allergies but has not been taking his Zyrtec  Review of Systems   Review of Systems   Constitutional: Positive for fatigue  Negative for chills and fever  HENT: Positive for congestion, ear pain, postnasal drip and rhinorrhea  Negative for sore throat  Respiratory: Positive for chest tightness and wheezing  Negative for cough and shortness of breath  Cardiovascular: Negative for chest pain and palpitations  Gastrointestinal: Negative for abdominal pain, diarrhea, nausea and vomiting  Skin: Negative for rash  Neurological: Positive for headaches  Negative for dizziness and light-headedness           Current Medications       Current Outpatient Medications:   •  amoxicillin-clavulanate (AUGMENTIN) 875-125 mg per tablet, Take 1 tablet by mouth every 12 (twelve) hours for 7 days, Disp: 14 tablet, Rfl: 0  •  atorvastatin (LIPITOR) 40 mg tablet, Take 1 tablet by mouth daily, Disp: , Rfl:   •  escitalopram (LEXAPRO) 5 mg tablet, Take 1 tablet (5 mg total) by mouth daily after lunch, Disp: 30 tablet, Rfl: 0  •  esomeprazole (NexIUM) 40 MG capsule, Take 40 mg by mouth, Disp: , Rfl:   •  fluticasone (FLONASE) 50 mcg/act nasal spray, 2 sprays into each nostril daily, Disp: , Rfl:   •  Suvorexant 5 MG TABS, Take 1 tablet (5 mg total) by mouth daily at bedtime for 21 days, Disp: 21 tablet, Rfl: 0  •  timolol (TIMOPTIC) 0 5 % ophthalmic solution, INSTILL ONE DROP INTO BOTH EYES TWICE DAILY, Disp: , Rfl:   •  clonazePAM (KlonoPIN) 0 125 mg disintegrating tablet, Use one tablet 3 times day for 2 weeks then twice daily for two weeks (Patient not taking: Reported on 11/10/2022), Disp: 75 tablet, Rfl: 0  •  diphenhydrAMINE-acetaminophen (TYLENOL PM)  MG TABS, Take 1 tablet by mouth daily at bedtime as needed for sleep (Patient not taking: Reported on 11/10/2022), Disp: , Rfl:   •  Dorzolamide HCl-Timolol Mal PF 22 3-6 8 MG/ML SOLN, Apply 1 drop to eye 2 (two) times a day (Patient not taking: Reported on 11/10/2022), Disp: , Rfl:   •  ibuprofen (MOTRIN) 600 mg tablet, Take 600 mg by mouth every 6 (six) hours as needed, Disp: , Rfl:   •  Latanoprostene Bunod 0 024 % SOLN, 1 drop both eyes at bedtime (Patient not taking: Reported on 11/10/2022), Disp: , Rfl:   •  loteprednol etabonate (LOTEMAX) 0 5 % ophthalmic suspension, INSTILL 1 DROP INTO RIGHT EYE 4 TIMES A DAY (Patient not taking: Reported on 11/10/2022), Disp: , Rfl:   • omeprazole (PriLOSEC) 40 MG capsule, , Disp: , Rfl:   •  ramelteon (ROZEREM) 8 mg tablet, Take 1 tablet (8 mg total) by mouth daily at bedtime (Patient not taking: Reported on 11/10/2022), Disp: 30 tablet, Rfl: 0  •  Vyzulta 0 024 % SOLN, , Disp: , Rfl:     Current Allergies     Allergies as of 11/10/2022 - Reviewed 11/10/2022   Allergen Reaction Noted   • Cat hair extract Cough 04/19/2016   • Horse-derived products Cough 04/19/2016   • Pollen extract Cough 12/02/2019            The following portions of the patient's history were reviewed and updated as appropriate: allergies, current medications, past family history, past medical history, past social history, past surgical history and problem list      Past Medical History:   Diagnosis Date   • GERD (gastroesophageal reflux disease)    • Glaucoma    • High cholesterol        Past Surgical History:   Procedure Laterality Date   • EYE SURGERY     • HERNIA REPAIR         Family History   Problem Relation Age of Onset   • Completed Suicide  Mother    • Cancer Father          Medications have been verified  Objective   /73   Pulse (!) 51   Temp (!) 96 1 °F (35 6 °C)   Resp 18   Ht 5' 10" (1 778 m)   Wt 79 4 kg (175 lb)   SpO2 97%   BMI 25 11 kg/m²   No LMP for male patient  Physical Exam     Physical Exam  Vitals and nursing note reviewed  Constitutional:       Appearance: Normal appearance  He is well-developed  HENT:      Head: Normocephalic and atraumatic  Right Ear: Tympanic membrane, ear canal and external ear normal       Left Ear: Tympanic membrane, ear canal and external ear normal       Nose: Nose normal       Mouth/Throat:      Pharynx: Uvula midline  Eyes:      General: Lids are normal       Conjunctiva/sclera: Conjunctivae normal       Pupils: Pupils are equal, round, and reactive to light  Cardiovascular:      Rate and Rhythm: Normal rate and regular rhythm  Heart sounds: Normal heart sounds  No murmur heard  No friction rub  No gallop  Pulmonary:      Effort: Pulmonary effort is normal       Breath sounds: No stridor  Examination of the left-lower field reveals rales  Rales present  No wheezing or rhonchi  Musculoskeletal:         General: Normal range of motion  Cervical back: Neck supple  Skin:     General: Skin is warm and dry  Capillary Refill: Capillary refill takes less than 2 seconds  Neurological:      Mental Status: He is alert  Chest XR:  No evidence of acute cardiopulmonary etiology  Radiology interpretation pending

## 2022-11-10 NOTE — PATIENT INSTRUCTIONS
Take antibiotic as prescribed  Complete full dose even if symptoms began to improve  Take Zyrtec  Flonase  Drink plenty of fluids  Take deep breaths  Follow-up with family doctor in 3-5 days  Go to ER if symptoms become severe

## 2022-11-16 ENCOUNTER — OFFICE VISIT (OUTPATIENT)
Dept: PSYCHIATRY | Facility: CLINIC | Age: 82
End: 2022-11-16

## 2022-11-16 DIAGNOSIS — F33.0 MAJOR DEPRESSIVE DISORDER, RECURRENT, MILD (HCC): Primary | ICD-10-CM

## 2022-11-16 DIAGNOSIS — G47.00 INSOMNIA, UNSPECIFIED TYPE: ICD-10-CM

## 2022-11-16 DIAGNOSIS — F41.1 GAD (GENERALIZED ANXIETY DISORDER): ICD-10-CM

## 2022-11-16 RX ORDER — BROMFENAC SODIUM 0.7 MG/ML
SOLUTION/ DROPS OPHTHALMIC
COMMUNITY
Start: 2022-11-14

## 2022-11-16 RX ORDER — ESCITALOPRAM OXALATE 10 MG/1
10 TABLET ORAL DAILY
Qty: 30 TABLET | Refills: 0 | Status: SHIPPED | OUTPATIENT
Start: 2022-11-16 | End: 2022-12-16

## 2022-11-16 RX ORDER — CLONAZEPAM 0.12 MG/1
0.12 TABLET, ORALLY DISINTEGRATING ORAL 2 TIMES DAILY PRN
Qty: 20 TABLET | Refills: 0 | Status: SHIPPED | OUTPATIENT
Start: 2022-11-16

## 2022-11-16 NOTE — PSYCH
MEDICATION MANAGEMENT NOTE        [facility]      Name and Date of Birth:  Dilip Livingston [de-identified] y o  1940 MRN: [id number]    Date of Visit: November 16, 2022    Reason for Visit:   Chief Complaint   Patient presents with   • Follow-up       SUBJECTIVE:    Elvira Edmonds is seen today for a follow up for Generalized Anxiety Disorder and insomnia  He continues to do relatively well since the last visit  He reports that on and off anxiety symptoms have been fairly stable, reports Difficulty staying asleep  Patient is currently on Lexapro 5 mg, Belsomra 5 mg and Klonopin 0 125 mg b i d  as needed  He reports improvement of his anxiety and he now takes Klonopin less frequent than before  Continues to express depressive thoughts and emotions and early morning and has difficulty sleeping more than 4 hours at nighttime  He goes to bed at around 10:00 p m  and wakes up between 2 and 3:00 a m  he reports that his wife wakes at 4:00 a m  and stayed together throughout the day  He reports today being active during the daytime by going to the gym and walking his dog on going to the stores  At night tries to relax and watch TV without exerting himself  He is no longer having GI to side effects from Lexapro but reports on and off headaches possibly from [address]  He reported that he has to pay 50 $ per month to get Belsomra which he discounted price down from 400 dollars  He has Medicare and has supplemental plan through [employer] and has another prescription plan separately  He reports that he has a  to buy his supplemental and prescription Plan insurance every year and he will try to find good plan that can cover his medication  Sleep study results are not in chart yet but patient has follow-up with sleep medicine at [facility] in the next week       He denies any suicidal ideation, intent or plan at present; denies any homicidal ideation, intent or plan at present  He denies any auditory hallucinations, denies any visual hallucinations, denies any overt delusions  He reports headache    Have    HPI ROS Appetite Changes and Sleep:     He reports difficulty sleeping, normal appetite, normal energy level      Review Of Systems:      Constitutional negative   ENT negative   Cardiovascular negative   Respiratory negative   Gastrointestinal negative   Genitourinary negative   Musculoskeletal negative   Integumentary negative   Neurological headache   Endocrine negative   Other Symptoms none, all other systems are negative         Past Medical History:    Past Medical History:   Diagnosis Date   • GERD (gastroesophageal reflux disease)    • Glaucoma    • High cholesterol         Past Surgical History:   Procedure Laterality Date   • EYE SURGERY     • HERNIA REPAIR       Allergies   Allergen Reactions   • Cat Hair Extract Cough   • Horse-Derived Products Cough   • Pollen Extract Cough       Substance Abuse History:    Social History     Substance and Sexual Activity   Alcohol Use Not Currently     Social History     Substance and Sexual Activity   Drug Use Never       Social History:    Social History     Socioeconomic History   • Marital status: Unknown     Spouse name: Not on file   • Number of children: Not on file   • Years of education: Not on file   • Highest education level: Not on file   Occupational History   • Not on file   Tobacco Use   • Smoking status: Never   • Smokeless tobacco: Never   Vaping Use   • Vaping Use: Never used   Substance and Sexual Activity   • Alcohol use: Not Currently   • Drug use: Never   • Sexual activity: Not Currently   Other Topics Concern   • Not on file   Social History Narrative   • Not on file     Social Determinants of Health     Financial Resource Strain: Medium Risk   • Difficulty of Paying Living Expenses: Somewhat hard   Food Insecurity: Not on file   Transportation Needs: Not on file   Physical Activity: Not on file   Stress: Not on file   Social Connections: Not on file   Intimate Partner Violence: Not on file   Housing Stability: Not on file       Family Psychiatric History:     Family History   Problem Relation Age of Onset   • Completed Suicide  Mother    • Cancer Father        History Review: The following portions of the patient's history were reviewed and updated as appropriate: allergies, current medications, past family history, past medical history, past social history, past surgical history and problem list          OBJECTIVE:     Vital signs in last 24 hours: There were no vitals filed for this visit      Mental Status Evaluation:    Appearance age appropriate, casually dressed   Behavior cooperative, calm   Speech normal rate, normal volume, normal pitch   Mood improved, anxious   Affect normal range and intensity, appropriate   Thought Processes organized, goal directed   Associations intact associations   Thought Content no overt delusions   Perceptual Disturbances: no auditory hallucinations, no visual hallucinations   Abnormal Thoughts  Risk Potential Suicidal ideation - None  Homicidal ideation - None  Potential for aggression - No   Orientation oriented to person, place, time/date and situation   Memory recent and remote memory grossly intact   Consciousness alert and awake   Attention Span Concentration Span attention span and concentration are age appropriate   Intellect appears to be of average intelligence   Insight intact   Judgement intact   Muscle Strength and  Gait normal muscle strength and normal muscle tone, normal gait and normal balance   Motor activity no abnormal movements   Language no difficulty naming common objects, no difficulty repeating a phrase, no difficulty writing a sentence   Fund of Knowledge adequate knowledge of current events  adequate fund of knowledge regarding past history  adequate fund of knowledge regarding vocabulary    Pain none   Pain Scale 0 Laboratory Results: I have personally reviewed all pertinent laboratory/tests results    Most Recent Labs: No results found for: WBC, RBC, HGB, HCT, PLT, RDW, NEUTROABS, NA, K, CL, CO2, BUN, CREATININE, GLUCOSE, CALCIUM, AST, ALT, ALKPHOS, PROT, BILITOT, CHOL, CHOLESTEROL, TRIG, HDL, LDLCALC, NONHDLC, VALPROICTOT, CARBAMAZEPIN, LITHIUM, AMMONIA, [id number], FREET4, T3FREE, PREGTESTUR, PREGSERUM, HCG, HCGQUANT, RPR    Suicide/Homicide Risk Assessment:    Risk of Harm to Self:  The following ratings are based on assessment at the time of the interview  Based on today's assessment, Leslie Foster presents the following risk of harm to self: minimal    Risk of Harm to Others: The following ratings are based on assessment at the time of the interview  Based on today's assessment, Leslie Foster presents the following risk of harm to others: minimal    The following interventions are recommended: no intervention changes needed    Assessment/Plan:  Marianna Mccann is [de-identified]years old adult male patient was seen today for her scheduled follow-up for management of his insomnia, anxiety and depression  So far anxiety improved and Klonopin is being taking less frequently than before and currently we are decreasing number of pills prescribed per month so the medication is not taking on daily basis  Lexapro has been effective for anxiety but partially effective for depression and we do have room to increase the dosage so will increase up to 10 mg and currently patient denies gastric side effects that he used to have before  For his sleep it seems that [address] has been working better than ramelteon and Ambien and also have room to increase the dosage  Will increase Belsomra up to 10 mg and I provided patient with discount coupon for the next month and advised him to lock up for prescription planned that could cover the medication in a better way          Diagnoses and all orders for this visit:    Major depressive disorder, recurrent, mild ([facility] 75 )  -     escitalopram (Lexapro) 10 mg tablet; Take 1 tablet (10 mg total) by mouth daily    CHARISMA (generalized anxiety disorder)  -     escitalopram (Lexapro) 10 mg tablet; Take 1 tablet (10 mg total) by mouth daily  -     clonazePAM (KlonoPIN) 0 125 mg disintegrating tablet; Take 1 tablet (0 125 mg total) by mouth 2 (two) times a day as needed for anxiety for up to 20 doses    Insomnia, unspecified type  -     Discontinue: Suvorexant 10 MG TABS; Take 1 tablet (10 mg total) by mouth daily at bedtime    Other orders  -     Prolensa 0 07 % SOLN          Treatment Recommendations/Precautions:      Medication changes: I have discontinued Amish Pulido's Dorzolamide HCl-Timolol Mal PF, Vyzulta, omeprazole, Latanoprostene Bunod, loteprednol etabonate, clonazePAM, escitalopram, ramelteon, Suvorexant, and diphenhydrAMINE-acetaminophen  I am also having him start on escitalopram, Suvorexant, and clonazePAM  Additionally, I am having him maintain his fluticasone, ibuprofen, timolol, atorvastatin, esomeprazole, amoxicillin-clavulanate, and Prolensa      Current Outpatient Medications:   •  amoxicillin-clavulanate (AUGMENTIN) 875-125 mg per tablet, Take 1 tablet by mouth every 12 (twelve) hours for 7 days, Disp: 14 tablet, Rfl: 0  •  atorvastatin (LIPITOR) 40 mg tablet, Take 1 tablet by mouth daily, Disp: , Rfl:   •  clonazePAM (KlonoPIN) 0 125 mg disintegrating tablet, Take 1 tablet (0 125 mg total) by mouth 2 (two) times a day as needed for anxiety for up to 20 doses, Disp: 20 tablet, Rfl: 0  •  escitalopram (Lexapro) 10 mg tablet, Take 1 tablet (10 mg total) by mouth daily, Disp: 30 tablet, Rfl: 0  •  esomeprazole (NexIUM) 40 MG capsule, Take 40 mg by mouth, Disp: , Rfl:   •  Prolensa 0 07 % SOLN, , Disp: , Rfl:   •  Suvorexant 10 MG TABS, Take 1 tablet (10 mg total) by mouth daily at bedtime, Disp: 30 tablet, Rfl: 0  •  timolol (TIMOPTIC) 0 5 % ophthalmic solution, INSTILL ONE DROP INTO BOTH EYES TWICE DAILY, Disp: , Rfl: •  fluticasone (FLONASE) 50 mcg/act nasal spray, 2 sprays into each nostril daily, Disp: , Rfl:   •  ibuprofen (MOTRIN) 600 mg tablet, Take 600 mg by mouth every 6 (six) hours as needed, Disp: , Rfl:   Byron Rodriguez has a current medication list which includes the following prescription(s): amoxicillin-clavulanate, atorvastatin, clonazepam, escitalopram, esomeprazole, prolensa, suvorexant, timolol, fluticasone, and ibuprofen  Aware of 24 hour and weekend coverage for urgent situations accessed by calling [facility] main practice number    Medications Risks/Benefits      Risks, Benefits And Possible Side Effects Of Medications:    Risks, benefits, and possible side effects of medications explained to Byron Rodriguez and he verbalizes understanding and agreement for treatment  Controlled Medication Discussion:     Byron Rodriguez has been filling controlled prescriptions on time as prescribed according to [address] [place] [address] [employer]    Psychotherapy Provided:     Individual psychotherapy provided: Yes  Counseling was provided during the session today for 45 minutes  Medications, treatment progress and treatment plan reviewed with Byron Rodriguez  Medication changes discussed with Byron Rodriguez  Medication education provided to Byron Rodriguez  Importance of medication and treatment compliance reviewed with Byron Rodriguez  Educated on importance of medication and treatment compliance  Importance of follow up with family physician for medical issues reviewed with Byron Rodriguez  Discussed with Byron Rodriguez acceptance of mental illness diagnosis and need for ongoing psychiatric treatment  Importance of follow up for substance abuse issues discussed with Byron Rodriguez  Supportive therapy provided  Cognitive therapy was utilized during the session  Reassurance and supportive therapy provided  Reoriented to reality and reassured        Treatment Plan:    Completed and signed during the session: Not applicable - Treatment Plan not due at this session    Note Share:    This note was not shared with the patient due to reasonable likelihood of causing patient harm    Visit start and stop times:    Start Time: 10:00 AM  Stop Time: 10:45 AM    I spent 45 minutes directly with the patient during this visit    Idalmis Mejia MD 11/16/22

## 2022-11-16 NOTE — TELEPHONE ENCOUNTER
Patients medication for Suvorexant 10 mg needs to be sent to a different pharmacy, they don't have the medication  The new  pharamcy was updated in the chart

## 2022-11-22 ENCOUNTER — TELEPHONE (OUTPATIENT)
Dept: PSYCHIATRY | Facility: CLINIC | Age: 82
End: 2022-11-22

## 2022-11-22 NOTE — TELEPHONE ENCOUNTER
Spoke with the patient today over the phone about his concerns about the Lexapro not working as he expected  He is still complaining from anxiety and depression but it is still better than his symptoms prior to starting the medication  We increase the dosage of Lexapro from 5-10 last week and disorderly to decide whether it is helping him or not  His sleep improved after increasing the Belsomra from 5-10 and he now gets 7 hours of sleep which he never did get before  He states started not show any signs of sleep apnea or restless legs syndrome  Patient was reassured by the goal and will continue with the same medication changes with the last visit he sees me again in the clinic

## 2022-11-22 NOTE — TELEPHONE ENCOUNTER
Jillian Sosa would like to speak to you today, if all possible before 11am or after 12p      He is having a lot of anxiety with the change in lexapro and would like to speak to you about it

## 2022-12-07 ENCOUNTER — APPOINTMENT (OUTPATIENT)
Dept: RADIOLOGY | Facility: CLINIC | Age: 82
End: 2022-12-07

## 2022-12-07 ENCOUNTER — OFFICE VISIT (OUTPATIENT)
Dept: URGENT CARE | Facility: CLINIC | Age: 82
End: 2022-12-07

## 2022-12-07 ENCOUNTER — TELEPHONE (OUTPATIENT)
Dept: PSYCHIATRY | Facility: CLINIC | Age: 82
End: 2022-12-07

## 2022-12-07 VITALS
TEMPERATURE: 97.7 F | HEART RATE: 55 BPM | OXYGEN SATURATION: 96 % | SYSTOLIC BLOOD PRESSURE: 149 MMHG | DIASTOLIC BLOOD PRESSURE: 75 MMHG | BODY MASS INDEX: 25.05 KG/M2 | WEIGHT: 175 LBS | HEIGHT: 70 IN | RESPIRATION RATE: 20 BRPM

## 2022-12-07 DIAGNOSIS — J40 BRONCHITIS: ICD-10-CM

## 2022-12-07 DIAGNOSIS — R05.9 COUGH, UNSPECIFIED TYPE: ICD-10-CM

## 2022-12-07 DIAGNOSIS — J01.00 ACUTE MAXILLARY SINUSITIS, RECURRENCE NOT SPECIFIED: Primary | ICD-10-CM

## 2022-12-07 RX ORDER — DOXYCYCLINE 100 MG/1
100 TABLET ORAL 2 TIMES DAILY
Qty: 28 TABLET | Refills: 0 | Status: SHIPPED | OUTPATIENT
Start: 2022-12-07 | End: 2022-12-21

## 2022-12-07 RX ORDER — PREDNISONE 10 MG/1
TABLET ORAL
Qty: 20 TABLET | Refills: 0 | Status: SHIPPED | OUTPATIENT
Start: 2022-12-07

## 2022-12-07 NOTE — TELEPHONE ENCOUNTER
Returned Amish's call  Hostetter Punch states for the last 2 nights, he has not been able to fall asleep or stay asleep  Belsomra increased to 10 MG about 2 weeks ago  He is very frustrated because he can't nap during the day  States that this inability to sleep is really impairing his ability to function  He took 2 Tylenol PM because hecouldn't fall asleep and that helped  He is requesting recommendation  Will refer to Dr Mckayla Jefferson for review

## 2022-12-07 NOTE — PROGRESS NOTES
[facility]        NAME: Ovi Bolanos is a [de-identified] y o  male  : 1940    MRN: [id number]  DATE: 2022  TIME: 4:44 PM    /75   Pulse 55   Temp 97 7 °F (36 5 °C)   Resp 20   Ht 5' 10" (1 778 m)   Wt 79 4 kg (175 lb)   SpO2 96%   BMI 25 11 kg/m²     Assessment and Plan   Acute maxillary sinusitis, recurrence not specified [J01 00]  1  Acute maxillary sinusitis, recurrence not specified  XR chest pa & lateral    doxycycline (ADOXA) 100 MG tablet    predniSONE 10 mg tablet      2  Bronchitis  doxycycline (ADOXA) 100 MG tablet    predniSONE 10 mg tablet            Patient Instructions       Follow up with PCP in 3-5 days  Proceed to  ER if symptoms worsen  Chief Complaint     Chief Complaint   Patient presents with   • Cough     Pt reports just getting over a uri from 11/10/22, given abx and finished script however states he usually needs 2 abx when he gets a sinus infection  Reports left ear being blocked and chest congestion  History of Present Illness       Pt with continued sore throat nasal congestion, pt now with productive cough , pt states augmentin helped some but it did not go away,  Left ear fullness also       Review of Systems   Review of Systems   Constitutional: Negative  HENT: Negative  Eyes: Negative  Respiratory: Negative  Cardiovascular: Negative  Gastrointestinal: Negative  Endocrine: Negative  Genitourinary: Negative  Musculoskeletal: Negative  Skin: Negative  Allergic/Immunologic: Negative  Neurological: Negative  Psychiatric/Behavioral: Negative  All other systems reviewed and are negative          Current Medications       Current Outpatient Medications:   •  atorvastatin (LIPITOR) 40 mg tablet, Take 1 tablet by mouth daily, Disp: , Rfl:   •  clonazePAM (KlonoPIN) 0 125 mg disintegrating tablet, Take 1 tablet (0 125 mg total) by mouth 2 (two) times a day as needed for anxiety for up to 20 doses, Disp: 20 tablet, Rfl: 0  •  doxycycline (ADOXA) 100 MG tablet, Take 1 tablet (100 mg total) by mouth 2 (two) times a day for 14 days, Disp: 28 tablet, Rfl: 0  •  escitalopram (Lexapro) 10 mg tablet, Take 1 tablet (10 mg total) by mouth daily, Disp: 30 tablet, Rfl: 0  •  esomeprazole (NexIUM) 40 MG capsule, Take 40 mg by mouth, Disp: , Rfl:   •  predniSONE 10 mg tablet, 4 tabs po qd x 2 days then 3 tabs po qd x 2 days then 2 tabs po qd x 2 days then 1 tab po qd x 2 days, Disp: 20 tablet, Rfl: 0  •  Prolensa 0 07 % SOLN, , Disp: , Rfl:   •  Suvorexant 10 MG TABS, Take 1 tablet (10 mg total) by mouth daily at bedtime, Disp: 30 tablet, Rfl: 0  •  timolol (TIMOPTIC) 0 5 % ophthalmic solution, INSTILL ONE DROP INTO BOTH EYES TWICE DAILY, Disp: , Rfl:   •  fluticasone (FLONASE) 50 mcg/act nasal spray, 2 sprays into each nostril daily, Disp: , Rfl:   •  ibuprofen (MOTRIN) 600 mg tablet, Take 600 mg by mouth every 6 (six) hours as needed, Disp: , Rfl:     Current Allergies     Allergies as of 12/07/2022 - Reviewed 12/07/2022   Allergen Reaction Noted   • Cat hair extract Cough 04/19/2016   • Horse-derived products Cough 04/19/2016   • Pollen extract Cough 12/02/2019            The following portions of the patient's history were reviewed and updated as appropriate: allergies, current medications, past family history, past medical history, past social history, past surgical history and problem list      Past Medical History:   Diagnosis Date   • GERD (gastroesophageal reflux disease)    • Glaucoma    • High cholesterol        Past Surgical History:   Procedure Laterality Date   • EYE SURGERY     • HERNIA REPAIR         Family History   Problem Relation Age of Onset   • Completed Suicide  Mother    • Cancer Father          Medications have been verified          Objective   /75   Pulse 55   Temp 97 7 °F (36 5 °C)   Resp 20   Ht 5' 10" (1 778 m)   Wt 79 4 kg (175 lb)   SpO2 96%   BMI 25 11 kg/m²        Physical Exam     Physical Exam  Vitals and nursing note reviewed  Constitutional:       Appearance: Normal appearance  He is normal weight  Comments: Pt asking for long antiobiotic , pt states he always need 14 days not 10 days of antibiotics for his past sinusitis    HENT:      Head: Normocephalic and atraumatic  Right Ear: Tympanic membrane, ear canal and external ear normal       Left Ear: Tympanic membrane, ear canal and external ear normal       Nose: Congestion and rhinorrhea present  Comments: Max sinus tender to palp  Boggy mucosa      Mouth/Throat:      Mouth: Mucous membranes are moist       Pharynx: Oropharynx is clear  Eyes:      Conjunctiva/sclera: Conjunctivae normal       Pupils: Pupils are equal, round, and reactive to light  Cardiovascular:      Rate and Rhythm: Normal rate and regular rhythm  Pulses: Normal pulses  Heart sounds: Normal heart sounds  Pulmonary:      Effort: Pulmonary effort is normal       Comments: Minor coarse sounds   Abdominal:      General: Abdomen is flat  Bowel sounds are normal       Palpations: Abdomen is soft  Musculoskeletal:         General: Normal range of motion  Cervical back: Normal range of motion and neck supple  Skin:     General: Skin is warm  Capillary Refill: Capillary refill takes less than 2 seconds  Neurological:      General: No focal deficit present  Mental Status: He is alert and oriented to person, place, and time     Psychiatric:         Mood and Affect: Mood normal          Behavior: Behavior normal

## 2022-12-09 NOTE — TELEPHONE ENCOUNTER
Spoke with the patient over the phone yesterday and discussed with him management of his insomnia  I asking him to try different combinations of medications for sleep as combining the ramelteon with Belsomra or combining Belsomra with Tylenol PM   I told him the risk for Tylenol PM to cause cognitive impairment would not be high if he does not take the medication on a daily basis for longer duration  Patient will be seen next week in the clinic and he was reassured that I called him back

## 2022-12-12 ENCOUNTER — DOCTOR'S OFFICE (OUTPATIENT)
Dept: URBAN - NONMETROPOLITAN AREA CLINIC 1 | Facility: CLINIC | Age: 82
Setting detail: OPHTHALMOLOGY
End: 2022-12-12
Payer: COMMERCIAL

## 2022-12-12 ENCOUNTER — RX ONLY (RX ONLY)
Age: 82
End: 2022-12-12

## 2022-12-12 DIAGNOSIS — H35.371: ICD-10-CM

## 2022-12-12 DIAGNOSIS — H40.1113: ICD-10-CM

## 2022-12-12 DIAGNOSIS — H04.121: ICD-10-CM

## 2022-12-12 DIAGNOSIS — H35.81: ICD-10-CM

## 2022-12-12 DIAGNOSIS — H40.1121: ICD-10-CM

## 2022-12-12 DIAGNOSIS — H04.122: ICD-10-CM

## 2022-12-12 PROCEDURE — 92134 CPTRZ OPH DX IMG PST SGM RTA: CPT | Performed by: OPHTHALMOLOGY

## 2022-12-12 PROCEDURE — 83861 MICROFLUID ANALY TEARS: CPT | Performed by: OPHTHALMOLOGY

## 2022-12-12 PROCEDURE — 92014 COMPRE OPH EXAM EST PT 1/>: CPT | Performed by: OPHTHALMOLOGY

## 2022-12-12 ASSESSMENT — REFRACTION_MANIFEST
OS_VA2: 20/20
OD_AXIS: 115
OS_ADD: +2.50
OD_CYLINDER: -0.25
OS_SPHERE: +0.25
OU_VA: 20/20
OD_VA1: 20/30-2
OS_AXIS: 090
OD_SPHERE: -1.00
OS_VA1: 20/20
OD_ADD: +2.50
OS_CYLINDER: -1.25
OD_VA2: 20/30-2

## 2022-12-12 ASSESSMENT — LID EXAM ASSESSMENTS
OS_BLEPHARITIS: LLL LUL
OD_BLEPHARITIS: RLL RUL

## 2022-12-12 ASSESSMENT — CONFRONTATIONAL VISUAL FIELD TEST (CVF)
OS_FINDINGS: FULL
OD_FINDINGS: FULL

## 2022-12-12 ASSESSMENT — REFRACTION_CURRENTRX
OS_SPHERE: +0.50
OS_ADD: +2.50
OD_VPRISM_DIRECTION: PROGS
OD_ADD: +2.50
OS_OVR_VA: 20/
OD_AXIS: 111
OD_CYLINDER: -0.25
OS_CYLINDER: -1.50
OD_SPHERE: -0.50
OS_VPRISM_DIRECTION: PROGS
OS_AXIS: 86
OD_OVR_VA: 20/

## 2022-12-12 ASSESSMENT — KERATOMETRY
OS_K1POWER_DIOPTERS: 45.75
OS_K2POWER_DIOPTERS: 46.00
OD_K2POWER_DIOPTERS: 46.00
OD_K1POWER_DIOPTERS: 45.75
OS_AXISANGLE_DEGREES: 120
OD_AXISANGLE_DEGREES: 011

## 2022-12-12 ASSESSMENT — VISUAL ACUITY
OS_BCVA: 20/100-1
OD_BCVA: 20/60-1

## 2022-12-12 ASSESSMENT — AXIALLENGTH_DERIVED
OD_AL: 23.1657
OD_AL: 23.1188
OS_AL: 23.0257
OS_AL: 22.8873

## 2022-12-12 ASSESSMENT — SPHEQUIV_DERIVED
OD_SPHEQUIV: -1
OS_SPHEQUIV: -0.75
OD_SPHEQUIV: -1.125
OS_SPHEQUIV: -0.375

## 2022-12-12 ASSESSMENT — REFRACTION_AUTOREFRACTION
OS_SPHERE: -0.25
OS_CYLINDER: -1.00
OD_AXIS: 180
OD_CYLINDER: 0.00
OD_SPHERE: -1.00
OS_AXIS: 093

## 2022-12-14 ENCOUNTER — OFFICE VISIT (OUTPATIENT)
Dept: PSYCHIATRY | Facility: CLINIC | Age: 82
End: 2022-12-14

## 2022-12-14 DIAGNOSIS — G47.00 INSOMNIA, UNSPECIFIED TYPE: ICD-10-CM

## 2022-12-14 DIAGNOSIS — F41.1 GAD (GENERALIZED ANXIETY DISORDER): ICD-10-CM

## 2022-12-14 DIAGNOSIS — F33.0 MAJOR DEPRESSIVE DISORDER, RECURRENT, MILD (HCC): ICD-10-CM

## 2022-12-14 RX ORDER — ESCITALOPRAM OXALATE 10 MG/1
10 TABLET ORAL DAILY
Qty: 30 TABLET | Refills: 0 | Status: SHIPPED | OUTPATIENT
Start: 2022-12-14 | End: 2023-01-13

## 2022-12-14 RX ORDER — MONTELUKAST SODIUM 10 MG/1
10 TABLET ORAL
COMMUNITY
Start: 2022-12-12 | End: 2023-12-12

## 2022-12-14 RX ORDER — BUPROPION HYDROCHLORIDE 150 MG/1
150 TABLET ORAL EVERY MORNING
Qty: 30 TABLET | Refills: 0 | Status: SHIPPED | OUTPATIENT
Start: 2022-12-14 | End: 2023-01-13

## 2022-12-14 RX ORDER — TIMOLOL MALEATE 2.5 MG/ML
1 SOLUTION OPHTHALMIC 2 TIMES DAILY
COMMUNITY
End: 2022-12-14

## 2022-12-14 RX ORDER — ACETAMINOPHEN,DIPHENHYDRAMINE HCL 500; 25 MG/1; MG/1
1 TABLET, FILM COATED ORAL
COMMUNITY

## 2022-12-14 NOTE — PSYCH
MEDICATION MANAGEMENT NOTE        [place] [address] [facility]      Name and Date of Birth:  Julissa Umanzor [de-identified] y o  1940 MRN: [id number]    Date of Visit: December 14, 2022    Reason for Visit:   Chief Complaint   Patient presents with   • Follow-up     I can only get out of bed and I am a little depressed once I went psychotic and I have to talk to myself a little bit but in all fairness right now I am dealing with a pretty major sinus infection and sinus infection and cannot been put on prednisone and doxycycline the prednisone is making me very spacey       SUBJECTIVE:    Mily Law is seen today for a follow up for Generalized Anxiety Disorder and insomnia  He continues to do relatively well since the last visit  He reports that on and off anxiety symptoms have been fairly stable, reports Difficulty staying asleep  Patient is currently on Lexapro 10 mg, Belsomra 10 mg and Klonopin 0 125 mg b i d  as needed  He reports improvement of his anxiety and he now takes Klonopin less frequent than before  "The office in the last week complaining about his insomnia  He used to sleep 6 hours at night after increasing Belsomra dosage from 5-10 however the effect was waning off  He tried to take Tylenol PM for 2 nights and slept much better  I called him back and we discussed using different combinations between Belsomra, ramelteon and Tylenol PM and see which one is more effective  He had multiple trials in the last few nights and he reports that the combination of Belsomra and Tylenol PM was the best   He still complains of from morning depression especially in the few hours between waking up and having breakfast   He also complains from depression that comes into darkness which is more frequent now in the wintertime  We discussed today having bright light therapy device to use in the early morning  We also discussed starting Wellbutrin to help with seasonal affective symptoms  Patient is future oriented and plans to meet with his children and grandchildren in the [place] and [facility] holidays  He denies any suicidal ideation, intent or plan at present; denies any homicidal ideation, intent or plan at present  He denies any auditory hallucinations, denies any visual hallucinations, denies any overt delusions  He reports headache    However they are easily managed by Tylenol and Excedrin    HPI ROS Appetite Changes and Sleep:     He reports difficulty sleeping, normal appetite, normal energy level      Review Of Systems:      Constitutional negative   ENT negative   Cardiovascular negative   Respiratory cough and wheezing   Gastrointestinal negative   Genitourinary negative   Musculoskeletal negative   Integumentary negative   Neurological headache   Endocrine negative   Other Symptoms none, all other systems are negative         Past Medical History:    Past Medical History:   Diagnosis Date   • GERD (gastroesophageal reflux disease)    • Glaucoma    • High cholesterol         Past Surgical History:   Procedure Laterality Date   • EYE SURGERY     • HERNIA REPAIR       Allergies   Allergen Reactions   • Cat Hair Extract Cough   • Horse-Derived Products Cough   • Pollen Extract Cough       Substance Abuse History:    Social History     Substance and Sexual Activity   Alcohol Use Not Currently     Social History     Substance and Sexual Activity   Drug Use Never       Social History:    Social History     Socioeconomic History   • Marital status: Unknown     Spouse name: Not on file   • Number of children: Not on file   • Years of education: Not on file   • Highest education level: Not on file   Occupational History   • Not on file   Tobacco Use   • Smoking status: Never   • Smokeless tobacco: Never   Vaping Use   • Vaping Use: Never used   Substance and Sexual Activity   • Alcohol use: Not Currently   • Drug use: Never   • Sexual activity: Not Currently   Other Topics Concern   • Not on file   Social History Narrative   • Not on file     Social Determinants of Health     Financial Resource Strain: Medium Risk   • Difficulty of Paying Living Expenses: Somewhat hard   Food Insecurity: Not on file   Transportation Needs: Not on file   Physical Activity: Not on file   Stress: Not on file   Social Connections: Not on file   Intimate Partner Violence: Not on file   Housing Stability: Not on file       Family Psychiatric History:     Family History   Problem Relation Age of Onset   • Completed Suicide  Mother    • Cancer Father        History Review: The following portions of the patient's history were reviewed and updated as appropriate: allergies, current medications, past family history, past medical history, past social history, past surgical history and problem list          OBJECTIVE:     Vital signs in last 24 hours: There were no vitals filed for this visit      Mental Status Evaluation:    Appearance age appropriate, casually dressed   Behavior cooperative, calm   Speech normal rate, normal volume, normal pitch   Mood improved, anxious   Affect normal range and intensity, appropriate   Thought Processes organized, goal directed   Associations intact associations   Thought Content no overt delusions   Perceptual Disturbances: no auditory hallucinations, no visual hallucinations   Abnormal Thoughts  Risk Potential Suicidal ideation - None  Homicidal ideation - None  Potential for aggression - No   Orientation oriented to person, place, time/date and situation   Memory recent and remote memory grossly intact   Consciousness alert and awake   Attention Span Concentration Span attention span and concentration are age appropriate   Intellect appears to be of average intelligence   Insight intact   Judgement intact   Muscle Strength and  Gait normal muscle strength and normal muscle tone, normal gait and normal balance   Motor activity no abnormal movements   Language no difficulty naming common objects, no difficulty repeating a phrase, no difficulty writing a sentence   Fund of Knowledge adequate knowledge of current events  adequate fund of knowledge regarding past history  adequate fund of knowledge regarding vocabulary    Pain none   Pain Scale 0       Laboratory Results: I have personally reviewed all pertinent laboratory/tests results    Most Recent Labs: No results found for: WBC, RBC, HGB, HCT, PLT, RDW, NEUTROABS, NA, K, CL, CO2, BUN, CREATININE, GLUCOSE, CALCIUM, AST, ALT, ALKPHOS, PROT, BILITOT, CHOL, CHOLESTEROL, TRIG, HDL, LDLCALC, NONHDLC, VALPROICTOT, CARBAMAZEPIN, LITHIUM, AMMONIA, [id number], FREET4, T3FREE, PREGTESTUR, PREGSERUM, HCG, HCGQUANT, RPR    Suicide/Homicide Risk Assessment:    Risk of Harm to Self:  The following ratings are based on assessment at the time of the interview  Based on today's assessment, Leslie Foster presents the following risk of harm to self: minimal    Risk of Harm to Others: The following ratings are based on assessment at the time of the interview  Based on today's assessment, Leslie Foster presents the following risk of harm to others: minimal    The following interventions are recommended: no intervention changes needed    Assessment/Plan:  Marianna Mccann is [de-identified]years old adult male patient was seen today for her scheduled follow-up for management of his insomnia, anxiety and depression  So far anxiety improved and Klonopin is being taking less frequently than before and currently we are decreasing number of pills prescribed per month so the medication is not taking on daily basis  We started him on Lexapro and was titrated from 5 to 10 mg seemed to help feeling well with an anxiety but he still feels depressed  He gets side effects every time we increased Lexapro dosage so I discussed with him today adding Wellbutrin to help with seasonal depression rather than increasing Lexapro dosage    I also discussed with him using bright light therapy 1 hour in the early morning  We will keep with him at bedtime 10 mg and combine it with Tylenol PM 1 tablet to minimize side effects and same time give him good quality of sleep  Diagnoses and all orders for this visit:    Major depressive disorder, recurrent, mild (HCC)  -     escitalopram (Lexapro) 10 mg tablet; Take 1 tablet (10 mg total) by mouth daily  -     buPROPion (Wellbutrin XL) 150 mg 24 hr tablet; Take 1 tablet (150 mg total) by mouth every morning    CHARISMA (generalized anxiety disorder)  -     escitalopram (Lexapro) 10 mg tablet; Take 1 tablet (10 mg total) by mouth daily    Insomnia, unspecified type  -     Suvorexant 10 MG TABS; Take 1 tablet (10 mg total) by mouth daily at bedtime    Other orders  -     Aspirin-Acetaminophen-Caffeine (EXCEDRIN MIGRAINE PO); Take 1 tablet by mouth daily as needed  -     Discontinue: timolol (TIMOPTIC-XE) 0 25 % ophthalmic gel-forming; Apply 1 Dose to eye 2 (two) times a day  -     montelukast (SINGULAIR) 10 mg tablet;  Take 10 mg by mouth  -     diphenhydrAMINE-acetaminophen (TYLENOL PM)  MG TABS; Take 1 tablet by mouth daily at bedtime as needed for sleep          Treatment Recommendations/Precautions:      Medication changes: I am having Sam Roads maintain his fluticasone, ibuprofen, timolol, atorvastatin, esomeprazole, Prolensa, escitalopram, clonazePAM, Suvorexant, doxycycline, predniSONE, Aspirin-Acetaminophen-Caffeine (EXCEDRIN MIGRAINE PO), timolol, and montelukast     Current Outpatient Medications:   •  Aspirin-Acetaminophen-Caffeine (EXCEDRIN MIGRAINE PO), Take 1 tablet by mouth daily as needed, Disp: , Rfl:   •  atorvastatin (LIPITOR) 40 mg tablet, Take 1 tablet by mouth daily, Disp: , Rfl:   •  doxycycline (ADOXA) 100 MG tablet, Take 1 tablet (100 mg total) by mouth 2 (two) times a day for 14 days, Disp: 28 tablet, Rfl: 0  •  escitalopram (Lexapro) 10 mg tablet, Take 1 tablet (10 mg total) by mouth daily, Disp: 30 tablet, Rfl: 0  •  esomeprazole (NexIUM) 40 MG capsule, Take 40 mg by mouth, Disp: , Rfl:   •  montelukast (SINGULAIR) 10 mg tablet, Take 10 mg by mouth, Disp: , Rfl:   •  predniSONE 10 mg tablet, 4 tabs po qd x 2 days then 3 tabs po qd x 2 days then 2 tabs po qd x 2 days then 1 tab po qd x 2 days, Disp: 20 tablet, Rfl: 0  •  Prolensa 0 07 % SOLN, , Disp: , Rfl:   •  Suvorexant 10 MG TABS, Take 1 tablet (10 mg total) by mouth daily at bedtime, Disp: 30 tablet, Rfl: 0  •  timolol (TIMOPTIC) 0 5 % ophthalmic solution, INSTILL ONE DROP INTO BOTH EYES TWICE DAILY, Disp: , Rfl:   •  clonazePAM (KlonoPIN) 0 125 mg disintegrating tablet, Take 1 tablet (0 125 mg total) by mouth 2 (two) times a day as needed for anxiety for up to 20 doses (Patient not taking: Reported on 12/14/2022), Disp: 20 tablet, Rfl: 0  •  fluticasone (FLONASE) 50 mcg/act nasal spray, 2 sprays into each nostril daily, Disp: , Rfl:   •  ibuprofen (MOTRIN) 600 mg tablet, Take 600 mg by mouth every 6 (six) hours as needed, Disp: , Rfl:   •  timolol (TIMOPTIC-XE) 0 25 % ophthalmic gel-forming, Apply 1 Dose to eye 2 (two) times a day, Disp: , Rfl:   Elvira Edmonds has a current medication list which includes the following prescription(s): aspirin-acetaminophen-caffeine, atorvastatin, doxycycline, escitalopram, esomeprazole, montelukast, prednisone, prolensa, suvorexant, timolol, clonazepam, fluticasone, ibuprofen, and timolol  Aware of 24 hour and weekend coverage for urgent situations accessed by calling [facility] main practice number    Medications Risks/Benefits      Risks, Benefits And Possible Side Effects Of Medications:    Risks, benefits, and possible side effects of medications explained to Elvira Edmonds and he verbalizes understanding and agreement for treatment      Controlled Medication Discussion:     Elvira Edmonds has been filling controlled prescriptions on time as prescribed according to [facility]    Psychotherapy Provided:     Individual psychotherapy provided: Yes  Counseling was provided during the session today for 30 minutes  Medications, treatment progress and treatment plan reviewed with Mily Law  Medication changes discussed with Mily Law  Medication education provided to Mily Law  Importance of medication and treatment compliance reviewed with Mily Law  Educated on importance of medication and treatment compliance  Importance of follow up with family physician for medical issues reviewed with Mily Law  Discussed with Mily Law acceptance of mental illness diagnosis and need for ongoing psychiatric treatment  Importance of follow up for substance abuse issues discussed with Mily Law  Supportive therapy provided  Cognitive therapy was utilized during the session  Reassurance and supportive therapy provided  Reoriented to reality and reassured  Treatment Plan:    Completed and signed during the session: Not applicable - Treatment Plan not due at this session    Note Share:     This note was not shared with the patient due to reasonable likelihood of causing patient harm    Visit start and stop times:    Start Time: 10:30 AM  Stop Time: 11:15 AM    I spent 45 minutes directly with the patient during this visit    Gonzales Eckert MD 12/14/22

## 2022-12-16 ENCOUNTER — DOCTOR'S OFFICE (OUTPATIENT)
Dept: URBAN - NONMETROPOLITAN AREA CLINIC 1 | Facility: CLINIC | Age: 82
Setting detail: OPHTHALMOLOGY
End: 2022-12-16
Payer: COMMERCIAL

## 2022-12-16 ENCOUNTER — RX ONLY (RX ONLY)
Age: 82
End: 2022-12-16

## 2022-12-16 DIAGNOSIS — H35.81: ICD-10-CM

## 2022-12-16 DIAGNOSIS — H35.371: ICD-10-CM

## 2022-12-16 DIAGNOSIS — H40.1113: ICD-10-CM

## 2022-12-16 DIAGNOSIS — H40.61X3: ICD-10-CM

## 2022-12-16 DIAGNOSIS — H40.1121: ICD-10-CM

## 2022-12-16 PROCEDURE — 92235 FLUORESCEIN ANGRPH MLTIFRAME: CPT | Performed by: OPHTHALMOLOGY

## 2022-12-16 PROCEDURE — 99213 OFFICE O/P EST LOW 20 MIN: CPT | Performed by: OPHTHALMOLOGY

## 2022-12-16 PROCEDURE — 92250 FUNDUS PHOTOGRAPHY W/I&R: CPT | Performed by: OPHTHALMOLOGY

## 2022-12-16 RX ORDER — LATANOPROST 50 UG/ML
SOLUTION/ DROPS OPHTHALMIC
Qty: 5 | Refills: 6 | Status: ACTIVE | OUTPATIENT

## 2022-12-16 ASSESSMENT — REFRACTION_CURRENTRX
OS_AXIS: 86
OD_SPHERE: -0.50
OS_SPHERE: +0.50
OS_VPRISM_DIRECTION: PROGS
OD_VPRISM_DIRECTION: PROGS
OS_CYLINDER: -1.50
OD_ADD: +2.50
OD_CYLINDER: -0.25
OD_AXIS: 111
OS_ADD: +2.50
OS_OVR_VA: 20/
OD_OVR_VA: 20/

## 2022-12-16 ASSESSMENT — REFRACTION_MANIFEST
OU_VA: 20/20
OS_SPHERE: +0.25
OS_VA2: 20/20
OD_VA2: 20/30-2
OS_AXIS: 090
OD_AXIS: 115
OD_CYLINDER: -0.25
OS_CYLINDER: -1.25
OD_SPHERE: -1.00
OS_ADD: +2.50
OD_ADD: +2.50
OS_VA1: 20/20
OD_VA1: 20/30-2

## 2022-12-16 ASSESSMENT — REFRACTION_AUTOREFRACTION
OS_AXIS: 093
OD_AXIS: 180
OD_CYLINDER: 0.00
OD_SPHERE: -1.00
OS_CYLINDER: -1.00
OS_SPHERE: -0.25

## 2022-12-16 ASSESSMENT — KERATOMETRY
OD_K1POWER_DIOPTERS: 45.75
OD_K2POWER_DIOPTERS: 46.00
OS_K1POWER_DIOPTERS: 45.75
OS_K2POWER_DIOPTERS: 46.00
OD_AXISANGLE_DEGREES: 011
OS_AXISANGLE_DEGREES: 120

## 2022-12-16 ASSESSMENT — AXIALLENGTH_DERIVED
OD_AL: 23.1188
OS_AL: 23.0257
OD_AL: 23.1657
OS_AL: 22.8873

## 2022-12-16 ASSESSMENT — CONFRONTATIONAL VISUAL FIELD TEST (CVF)
OD_FINDINGS: FULL
OS_FINDINGS: FULL

## 2022-12-16 ASSESSMENT — SPHEQUIV_DERIVED
OS_SPHEQUIV: -0.75
OD_SPHEQUIV: -1
OD_SPHEQUIV: -1.125
OS_SPHEQUIV: -0.375

## 2022-12-16 ASSESSMENT — LID EXAM ASSESSMENTS
OD_BLEPHARITIS: RLL RUL
OS_BLEPHARITIS: LLL LUL

## 2022-12-16 ASSESSMENT — VISUAL ACUITY
OS_BCVA: 20/25-2
OD_BCVA: 20/30+2

## 2023-01-09 ENCOUNTER — RX ONLY (RX ONLY)
Age: 83
End: 2023-01-09

## 2023-01-09 RX ORDER — BROMFENAC 1.03 MG/ML
SOLUTION/ DROPS OPHTHALMIC
Qty: 5 | Refills: 4 | Status: ACTIVE | OUTPATIENT

## 2023-01-11 ENCOUNTER — OFFICE VISIT (OUTPATIENT)
Dept: PSYCHIATRY | Facility: CLINIC | Age: 83
End: 2023-01-11

## 2023-01-11 ENCOUNTER — APPOINTMENT (OUTPATIENT)
Dept: LAB | Facility: HOSPITAL | Age: 83
End: 2023-01-11

## 2023-01-11 VITALS
HEART RATE: 52 BPM | WEIGHT: 175 LBS | HEIGHT: 70 IN | DIASTOLIC BLOOD PRESSURE: 81 MMHG | SYSTOLIC BLOOD PRESSURE: 159 MMHG | BODY MASS INDEX: 25.05 KG/M2

## 2023-01-11 DIAGNOSIS — R53.83 FATIGUE, UNSPECIFIED TYPE: Primary | ICD-10-CM

## 2023-01-11 DIAGNOSIS — G47.00 INSOMNIA, UNSPECIFIED TYPE: ICD-10-CM

## 2023-01-11 DIAGNOSIS — R53.83 FATIGUE, UNSPECIFIED TYPE: ICD-10-CM

## 2023-01-11 DIAGNOSIS — E55.9 VITAMIN D DEFICIENCY: ICD-10-CM

## 2023-01-11 DIAGNOSIS — F41.1 GAD (GENERALIZED ANXIETY DISORDER): ICD-10-CM

## 2023-01-11 DIAGNOSIS — F33.0 MAJOR DEPRESSIVE DISORDER, RECURRENT, MILD (HCC): ICD-10-CM

## 2023-01-11 LAB
25(OH)D3 SERPL-MCNC: 19.5 NG/ML (ref 30–100)
MAGNESIUM SERPL-MCNC: 2.1 MG/DL (ref 1.6–2.6)

## 2023-01-11 RX ORDER — ESCITALOPRAM OXALATE 20 MG/1
20 TABLET ORAL DAILY
Qty: 30 TABLET | Refills: 0 | Status: SHIPPED | OUTPATIENT
Start: 2023-01-11 | End: 2023-02-10

## 2023-01-11 RX ORDER — FLUTICASONE PROPIONATE 220 UG/1
2 AEROSOL, METERED RESPIRATORY (INHALATION) 2 TIMES DAILY
COMMUNITY
Start: 2022-12-20

## 2023-01-11 RX ORDER — ALBUTEROL SULFATE 90 UG/1
AEROSOL, METERED RESPIRATORY (INHALATION)
COMMUNITY
Start: 2023-01-11

## 2023-01-11 RX ORDER — LATANOPROST 50 UG/ML
SOLUTION/ DROPS OPHTHALMIC
COMMUNITY
Start: 2022-12-16

## 2023-01-11 RX ORDER — CLONIDINE HYDROCHLORIDE 0.1 MG/1
0.1 TABLET ORAL
Qty: 10 TABLET | Refills: 0 | Status: SHIPPED | OUTPATIENT
Start: 2023-01-11 | End: 2023-01-21

## 2023-01-11 RX ORDER — BUPROPION HYDROCHLORIDE 75 MG/1
75 TABLET ORAL DAILY
Qty: 10 TABLET | Refills: 0 | Status: SHIPPED | OUTPATIENT
Start: 2023-01-11 | End: 2023-01-21

## 2023-01-11 NOTE — PSYCH
MEDICATION MANAGEMENT NOTE        [place] [address] [facility]      Name and Date of Birth:  Christopher Carcamo 81 y o  1940 MRN: [id number]    Date of Visit: January 11, 2023    Reason for Visit:   Chief Complaint   Patient presents with   • Follow-up       SUBJECTIVE:    Елена Reina is seen today for a follow up for Generalized Anxiety Disorder and insomnia  He continues to do relatively well since the last visit  He reports that on and off anxiety symptoms have been fairly stable, reports Difficulty staying asleep  Patient is currently on Lexapro 10 mg, Belsomra 10 mg and Wellbutrin 150 mg XL, he also takes Tylenol PM as needed at bedtime  Patient reportsthat his anxiety and depression levels are almost the same or mildly improving  He sleeps better now combining Belsomra and Tylenol PM at night and gets about 7 hours of night sleep  After he wakes up he has depressive symptoms for about 2 to 3 hours  He started doing bright light therapy and seems helping  He had difficulty tolerating Wellbutrin and he developed headaches after taking the medication  Also his blood pressure today was elevated in the office however patient reported that his blood pressure usually is normal at home  He continues to stay active and he goes to the gym 3 times per week  Patient reports having fatigue and there is no recent vitamin D levels in the chart  He denies any suicidal ideation, intent or plan at present; denies any homicidal ideation, intent or plan at present  He denies any auditory hallucinations, denies any visual hallucinations, denies any overt delusions  He reports headache    However they are easily managed by Tylenol and Excedrin    HPI ROS Appetite Changes and Sleep:     He reports adequate number of sleep hours, normal appetite, normal energy level      Review Of Systems:      Constitutional negative   ENT negative   Cardiovascular negative   Respiratory cough and wheezing Gastrointestinal negative   Genitourinary negative   Musculoskeletal negative   Integumentary negative   Neurological headache   Endocrine negative   Other Symptoms none, all other systems are negative         Past Medical History:    Past Medical History:   Diagnosis Date   • GERD (gastroesophageal reflux disease)    • Glaucoma    • High cholesterol         Past Surgical History:   Procedure Laterality Date   • EYE SURGERY     • HERNIA REPAIR       Allergies   Allergen Reactions   • Cat Hair Extract Cough   • Horse-Derived Products Cough   • Pollen Extract Cough       Substance Abuse History:    Social History     Substance and Sexual Activity   Alcohol Use Not Currently     Social History     Substance and Sexual Activity   Drug Use Never       Social History:    Social History     Socioeconomic History   • Marital status: Unknown     Spouse name: Not on file   • Number of children: Not on file   • Years of education: Not on file   • Highest education level: Not on file   Occupational History   • Not on file   Tobacco Use   • Smoking status: Never   • Smokeless tobacco: Never   Vaping Use   • Vaping Use: Never used   Substance and Sexual Activity   • Alcohol use: Not Currently   • Drug use: Never   • Sexual activity: Not Currently   Other Topics Concern   • Not on file   Social History Narrative   • Not on file     Social Determinants of Health     Financial Resource Strain: Medium Risk   • Difficulty of Paying Living Expenses: Somewhat hard   Food Insecurity: Not on file   Transportation Needs: Not on file   Physical Activity: Not on file   Stress: Not on file   Social Connections: Not on file   Intimate Partner Violence: Not on file   Housing Stability: Not on file       Family Psychiatric History:     Family History   Problem Relation Age of Onset   • Completed Suicide  Mother    • Cancer Father        History Review:  The following portions of the patient's history were reviewed and updated as appropriate: allergies, current medications, past family history, past medical history, past social history, past surgical history and problem list          OBJECTIVE:     Vital signs in last 24 hours:    Vitals:    01/11/23 1041   BP: 159/81   BP Location: Right arm   Patient Position: Sitting   Cuff Size: Standard   Pulse: (!) 52   Weight: 79 4 kg (175 lb)   Height: 5' 10" (1 778 m)       Mental Status Evaluation:    Appearance age appropriate, casually dressed   Behavior cooperative, calm   Speech normal rate, normal volume, normal pitch   Mood improved, anxious   Affect normal range and intensity, appropriate   Thought Processes organized, goal directed   Associations intact associations   Thought Content no overt delusions   Perceptual Disturbances: no auditory hallucinations, no visual hallucinations   Abnormal Thoughts  Risk Potential Suicidal ideation - None  Homicidal ideation - None  Potential for aggression - No   Orientation oriented to person, place, time/date and situation   Memory recent and remote memory grossly intact   Consciousness alert and awake   Attention Span Concentration Span attention span and concentration are age appropriate   Intellect appears to be of average intelligence   Insight intact   Judgement intact   Muscle Strength and  Gait normal muscle strength and normal muscle tone, normal gait and normal balance   Motor activity no abnormal movements   Language no difficulty naming common objects, no difficulty repeating a phrase, no difficulty writing a sentence   Fund of Knowledge adequate knowledge of current events  adequate fund of knowledge regarding past history  adequate fund of knowledge regarding vocabulary    Pain none   Pain Scale 0       Laboratory Results: I have personally reviewed all pertinent laboratory/tests results    Most Recent Labs: No results found for: WBC, RBC, HGB, HCT, PLT, RDW, NEUTROABS, NA, K, CL, CO2, BUN, CREATININE, GLUCOSE, CALCIUM, AST, ALT, ALKPHOS, PROT, BILITOT, CHOL, CHOLESTEROL, TRIG, HDL, LDLCALC, NONHDLC, VALPROICTOT, CARBAMAZEPIN, LITHIUM, AMMONIA, [id number], FREET4, T3FREE, PREGTESTUR, PREGSERUM, HCG, HCGQUANT, RPR    Suicide/Homicide Risk Assessment:    Risk of Harm to Self:  The following ratings are based on assessment at the time of the interview  Based on today's assessment, Forrest Garnican presents the following risk of harm to self: minimal    Risk of Harm to Others: The following ratings are based on assessment at the time of the interview  Based on today's assessment, Forrest Garcia presents the following risk of harm to others: minimal    The following interventions are recommended: no intervention changes needed    Assessment/Plan:  Nakita Carlos is 81years old adult male patient was seen today for her scheduled follow-up for management of his insomnia, anxiety and depression  Anxiety and depression were partially responding to Lexapro so we added Wellbutrin however patient is not tolerating Wellbutrin and does not feel it is helpful and prefers to take higher dose of Lexapro  We will discontinue Wellbutrin over 10 days by lowering its dose down to 75 mg  We will increase Lexapro to 20 mg and patient is aware of the side effects  We will try it for his insomnia ramelteon but failed and had the best results combining Belsomra with Tylenol PM and we will continue that  I suggested using clonidine at bedtime as it will be helpful for anxiety, insomnia and high blood pressure and provided patient only 10 tablets to try and see whether he would like to continue on it or not  We will order magnesium and vitamin D levels to rule out secondary causes of fatigue and depression  I referred the patient for depression group therapy as he still on the waiting list for individual therapy  Diagnoses and all orders for this visit:    Fatigue, unspecified type  -     Vitamin D 25 hydroxy; Future  -     Magnesium;  Future    Major depressive disorder, recurrent, mild ([facility] )  -     escitalopram (LEXAPRO) 20 mg tablet; Take 1 tablet (20 mg total) by mouth daily  -     buPROPion (WELLBUTRIN) 75 mg tablet; Take 1 tablet (75 mg total) by mouth in the morning for 10 days    Vitamin D deficiency  -     Vitamin D 25 hydroxy; Future    Insomnia, unspecified type  -     Suvorexant 10 MG TABS; Take 1 tablet (10 mg total) by mouth daily at bedtime  -     cloNIDine (Catapres) 0 1 mg tablet; Take 1 tablet (0 1 mg total) by mouth daily at bedtime for 10 days    CHARISMA (generalized anxiety disorder)  -     cloNIDine (Catapres) 0 1 mg tablet; Take 1 tablet (0 1 mg total) by mouth daily at bedtime for 10 days    Other orders  -     albuterol (PROVENTIL HFA,VENTOLIN HFA) 90 mcg/act inhaler  -     Flovent  MCG/ACT inhaler; Inhale 2 puffs 2 (two) times a day  -     latanoprost (XALATAN) 0 005 % ophthalmic solution; INSTILL 1 DROP INTO BOTH EYES AT BEDTIME          Treatment Recommendations/Precautions:      Medication changes: I have discontinued Amish Pulido's escitalopram and buPROPion  I am also having him start on escitalopram, buPROPion, and cloNIDine  Additionally, I am having him maintain his fluticasone, ibuprofen, timolol, atorvastatin, esomeprazole, Prolensa, predniSONE, Aspirin-Acetaminophen-Caffeine (EXCEDRIN MIGRAINE PO), montelukast, diphenhydrAMINE-acetaminophen, albuterol, Flovent HFA, latanoprost, and Suvorexant      Current Outpatient Medications:   •  albuterol (PROVENTIL HFA,VENTOLIN HFA) 90 mcg/act inhaler, , Disp: , Rfl:   •  Aspirin-Acetaminophen-Caffeine (EXCEDRIN MIGRAINE PO), Take 1 tablet by mouth daily as needed, Disp: , Rfl:   •  atorvastatin (LIPITOR) 40 mg tablet, Take 1 tablet by mouth daily, Disp: , Rfl:   •  buPROPion (WELLBUTRIN) 75 mg tablet, Take 1 tablet (75 mg total) by mouth in the morning for 10 days, Disp: 10 tablet, Rfl: 0  •  cloNIDine (Catapres) 0 1 mg tablet, Take 1 tablet (0 1 mg total) by mouth daily at bedtime for 10 days, Disp: 10 tablet, Rfl: 0  • diphenhydrAMINE-acetaminophen (TYLENOL PM)  MG TABS, Take 1 tablet by mouth daily at bedtime as needed for sleep, Disp: , Rfl:   •  escitalopram (LEXAPRO) 20 mg tablet, Take 1 tablet (20 mg total) by mouth daily, Disp: 30 tablet, Rfl: 0  •  esomeprazole (NexIUM) 40 MG capsule, Take 40 mg by mouth, Disp: , Rfl:   •  Flovent  MCG/ACT inhaler, Inhale 2 puffs 2 (two) times a day, Disp: , Rfl:   •  latanoprost (XALATAN) 0 005 % ophthalmic solution, INSTILL 1 DROP INTO BOTH EYES AT BEDTIME, Disp: , Rfl:   •  montelukast (SINGULAIR) 10 mg tablet, Take 10 mg by mouth, Disp: , Rfl:   •  Prolensa 0 07 % SOLN, , Disp: , Rfl:   •  Suvorexant 10 MG TABS, Take 1 tablet (10 mg total) by mouth daily at bedtime, Disp: 30 tablet, Rfl: 0  •  timolol (TIMOPTIC) 0 5 % ophthalmic solution, INSTILL ONE DROP INTO BOTH EYES TWICE DAILY, Disp: , Rfl:   •  fluticasone (FLONASE) 50 mcg/act nasal spray, 2 sprays into each nostril daily, Disp: , Rfl:   •  ibuprofen (MOTRIN) 600 mg tablet, Take 600 mg by mouth every 6 (six) hours as needed, Disp: , Rfl:   •  predniSONE 10 mg tablet, 4 tabs po qd x 2 days then 3 tabs po qd x 2 days then 2 tabs po qd x 2 days then 1 tab po qd x 2 days (Patient not taking: Reported on 1/11/2023), Disp: 20 tablet, Rfl: 0  Jaxson Montenegro has a current medication list which includes the following prescription(s): albuterol, aspirin-acetaminophen-caffeine, atorvastatin, bupropion, diphenhydramine-acetaminophen, escitalopram, esomeprazole, flovent hfa, latanoprost, montelukast, prolensa, suvorexant, timolol, fluticasone, ibuprofen, and prednisone    Aware of 24 hour and weekend coverage for urgent situations accessed by calling [facility] main practice number    Medications Risks/Benefits      Risks, Benefits And Possible Side Effects Of Medications:    Risks, benefits, and possible side effects of medications explained to Jaxson Montenegro and he verbalizes understanding and agreement for treatment  Controlled Medication Discussion:     Annelmyah Crouch has been filling controlled prescriptions on time as prescribed according to [address] [place] [address] Monitoring Program    Psychotherapy Provided:     Individual psychotherapy provided: Yes  Counseling was provided during the session today for 45 minutes  Medications, treatment progress and treatment plan reviewed with Mehul Crouch  Medication changes discussed with Mehul Crouch  Medication education provided to Mehul Crouch  Importance of medication and treatment compliance reviewed with Mehul Crouch  Educated on importance of medication and treatment compliance  Importance of follow up with family physician for medical issues reviewed with Mehul Crouch  Discussed with Mehul Crouch acceptance of mental illness diagnosis and need for ongoing psychiatric treatment  Importance of follow up for substance abuse issues discussed with Mehul Crouch  Supportive therapy provided  Cognitive therapy was utilized during the session  Reassurance and supportive therapy provided  Reoriented to reality and reassured  Treatment Plan:    Completed and signed during the session: Not applicable - Treatment Plan not due at this session    Note Share:     This note was not shared with the patient due to reasonable likelihood of causing patient harm    Visit start and stop times:    Start Time: 10:15 AM  Stop Time: 11:00 AM    I spent 45 minutes directly with the patient during this visit    Ruby Clifton MD 01/11/23

## 2023-01-12 ENCOUNTER — TELEPHONE (OUTPATIENT)
Dept: PSYCHIATRY | Facility: CLINIC | Age: 83
End: 2023-01-12

## 2023-01-12 DIAGNOSIS — R53.83 FATIGUE, UNSPECIFIED TYPE: ICD-10-CM

## 2023-01-12 DIAGNOSIS — E55.9 VITAMIN D DEFICIENCY: Primary | ICD-10-CM

## 2023-01-12 RX ORDER — ERGOCALCIFEROL 1.25 MG/1
50000 CAPSULE ORAL WEEKLY
Qty: 12 CAPSULE | Refills: 0 | Status: SHIPPED | OUTPATIENT
Start: 2023-01-12 | End: 2023-03-31

## 2023-01-12 NOTE — TELEPHONE ENCOUNTER
Lab results were received magnesium level is normal and vitamin D is low, I called the patient and discussed the results with him and informing him that I will send a prescription for vitamin D once weekly for the next 3 months  Patient is agreeable with the plan

## 2023-01-19 ENCOUNTER — DOCTOR'S OFFICE (OUTPATIENT)
Dept: URBAN - NONMETROPOLITAN AREA CLINIC 1 | Facility: CLINIC | Age: 83
Setting detail: OPHTHALMOLOGY
End: 2023-01-19
Payer: COMMERCIAL

## 2023-01-19 DIAGNOSIS — H35.81: ICD-10-CM

## 2023-01-19 DIAGNOSIS — H35.371: ICD-10-CM

## 2023-01-19 DIAGNOSIS — H04.122: ICD-10-CM

## 2023-01-19 DIAGNOSIS — H40.1121: ICD-10-CM

## 2023-01-19 DIAGNOSIS — H43.812: ICD-10-CM

## 2023-01-19 DIAGNOSIS — H40.61X3: ICD-10-CM

## 2023-01-19 DIAGNOSIS — H04.121: ICD-10-CM

## 2023-01-19 DIAGNOSIS — H40.1113: ICD-10-CM

## 2023-01-19 PROCEDURE — 92134 CPTRZ OPH DX IMG PST SGM RTA: CPT | Performed by: OPHTHALMOLOGY

## 2023-01-19 PROCEDURE — 99213 OFFICE O/P EST LOW 20 MIN: CPT | Performed by: OPHTHALMOLOGY

## 2023-01-19 PROCEDURE — 92202 OPSCPY EXTND ON/MAC DRAW: CPT | Performed by: OPHTHALMOLOGY

## 2023-01-19 ASSESSMENT — REFRACTION_CURRENTRX
OD_VPRISM_DIRECTION: PROGS
OS_ADD: +2.50
OS_OVR_VA: 20/
OD_ADD: +2.50
OS_SPHERE: +0.50
OD_CYLINDER: -0.25
OS_AXIS: 86
OD_SPHERE: -0.50
OS_CYLINDER: -1.50
OS_VPRISM_DIRECTION: PROGS
OD_AXIS: 111
OD_OVR_VA: 20/

## 2023-01-19 ASSESSMENT — KERATOMETRY
OD_K1POWER_DIOPTERS: 45.75
OS_AXISANGLE_DEGREES: 120
OS_K2POWER_DIOPTERS: 46.00
OD_AXISANGLE_DEGREES: 011
OD_K2POWER_DIOPTERS: 46.00
OS_K1POWER_DIOPTERS: 45.75

## 2023-01-19 ASSESSMENT — AXIALLENGTH_DERIVED
OD_AL: 23.1657
OS_AL: 22.8873
OD_AL: 23.1188
OS_AL: 23.0257

## 2023-01-19 ASSESSMENT — REFRACTION_MANIFEST
OS_VA2: 20/20
OD_VA1: 20/30-2
OD_CYLINDER: -0.25
OD_ADD: +2.50
OD_SPHERE: -1.00
OS_VA1: 20/20
OU_VA: 20/20
OS_ADD: +2.50
OS_CYLINDER: -1.25
OS_AXIS: 090
OD_VA2: 20/30-2
OS_SPHERE: +0.25
OD_AXIS: 115

## 2023-01-19 ASSESSMENT — REFRACTION_AUTOREFRACTION
OS_CYLINDER: -1.00
OD_AXIS: 180
OD_CYLINDER: 0.00
OS_SPHERE: -0.25
OS_AXIS: 093
OD_SPHERE: -1.00

## 2023-01-19 ASSESSMENT — SPHEQUIV_DERIVED
OD_SPHEQUIV: -1
OS_SPHEQUIV: -0.75
OS_SPHEQUIV: -0.375
OD_SPHEQUIV: -1.125

## 2023-01-19 ASSESSMENT — VISUAL ACUITY
OD_BCVA: 20/25-2
OS_BCVA: 20/25-2

## 2023-01-19 ASSESSMENT — CONFRONTATIONAL VISUAL FIELD TEST (CVF)
OD_FINDINGS: FULL
OS_FINDINGS: FULL

## 2023-01-19 ASSESSMENT — LID EXAM ASSESSMENTS
OD_BLEPHARITIS: RLL RUL
OS_BLEPHARITIS: LLL LUL

## 2023-02-03 DIAGNOSIS — F33.0 MAJOR DEPRESSIVE DISORDER, RECURRENT, MILD (HCC): ICD-10-CM

## 2023-02-06 RX ORDER — ESCITALOPRAM OXALATE 20 MG/1
TABLET ORAL
Qty: 90 TABLET | Refills: 1 | Status: SHIPPED | OUTPATIENT
Start: 2023-02-06

## 2023-02-08 ENCOUNTER — OFFICE VISIT (OUTPATIENT)
Dept: PSYCHIATRY | Facility: CLINIC | Age: 83
End: 2023-02-08

## 2023-02-08 VITALS — HEART RATE: 50 BPM | DIASTOLIC BLOOD PRESSURE: 73 MMHG | SYSTOLIC BLOOD PRESSURE: 119 MMHG

## 2023-02-08 DIAGNOSIS — G47.00 INSOMNIA, UNSPECIFIED TYPE: ICD-10-CM

## 2023-02-08 DIAGNOSIS — F41.1 GAD (GENERALIZED ANXIETY DISORDER): ICD-10-CM

## 2023-02-08 RX ORDER — CLONIDINE HYDROCHLORIDE 0.1 MG/1
0.1 TABLET ORAL EVERY 12 HOURS SCHEDULED
Qty: 120 TABLET | Refills: 0 | Status: SHIPPED | OUTPATIENT
Start: 2023-02-08 | End: 2023-04-09

## 2023-02-08 RX ORDER — BROMFENAC 1.03 MG/ML
SOLUTION/ DROPS OPHTHALMIC
COMMUNITY
Start: 2023-01-28

## 2023-02-08 NOTE — PSYCH
MEDICATION MANAGEMENT NOTE        [facility]      Name and Date of Birth:  Desire Naqvi 81 y o  1940 MRN: [id number]    Date of Visit: February 8, 2023    Reason for Visit:   Chief Complaint   Patient presents with   • Follow-up       SUBJECTIVE:    He Gale is seen today for a follow up for Generalized Anxiety Disorder and insomnia  He continues to do relatively well since the last visit  He reports that on and off anxiety symptoms have been fairly stable, reports Difficulty staying asleep  Patient is currently on Lexapro 20 mg, Belsomra 10 mg and Clonidine 0 1 mg, he also takes Tylenol PM as needed at bedtime  He reports taking clonidine for 10 days and he run out and he forgot to call us for refills patient reports improvement of his anxiety and depression after maximizing Lexapro to 20 mg and discontinuing Wellbutrin gradually  He sleeps better now combining Belsomra and Tylenol PM at night and gets about 7 hours of night sleep  He reports being groggy the first hour in the morning after waking up but he reports that he has been always slow starter in the morning  He is doing light therapy at evening time since he is busy in the morning and seems helping  He continues to stay active and he goes to the gym 5-6 times per week  He denies any suicidal ideation, intent or plan at present; denies any homicidal ideation, intent or plan at present  He denies any auditory hallucinations, denies any visual hallucinations, denies any overt delusions  He denies any side effects from medications        HPI ROS Appetite Changes and Sleep:     He reports adequate number of sleep hours, normal appetite, normal energy level      Review Of Systems:      Constitutional negative   ENT negative   Cardiovascular negative   Respiratory cough and wheezing   Gastrointestinal negative   Genitourinary negative   Musculoskeletal negative   Integumentary negative Neurological headache   Endocrine negative   Other Symptoms none, all other systems are negative         Past Medical History:    Past Medical History:   Diagnosis Date   • GERD (gastroesophageal reflux disease)    • Glaucoma    • High cholesterol         Past Surgical History:   Procedure Laterality Date   • EYE SURGERY     • HERNIA REPAIR       Allergies   Allergen Reactions   • Cat Hair Extract Cough   • Horse-Derived Products Cough   • Pollen Extract Cough       Substance Abuse History:    Social History     Substance and Sexual Activity   Alcohol Use Not Currently     Social History     Substance and Sexual Activity   Drug Use Never       Social History:    Social History     Socioeconomic History   • Marital status: Unknown     Spouse name: Not on file   • Number of children: Not on file   • Years of education: Not on file   • Highest education level: Not on file   Occupational History   • Not on file   Tobacco Use   • Smoking status: Never   • Smokeless tobacco: Never   Vaping Use   • Vaping Use: Never used   Substance and Sexual Activity   • Alcohol use: Not Currently   • Drug use: Never   • Sexual activity: Not Currently   Other Topics Concern   • Not on file   Social History Narrative   • Not on file     Social Determinants of Health     Financial Resource Strain: Medium Risk   • Difficulty of Paying Living Expenses: Somewhat hard   Food Insecurity: Not on file   Transportation Needs: Not on file   Physical Activity: Not on file   Stress: Not on file   Social Connections: Not on file   Intimate Partner Violence: Not on file   Housing Stability: Not on file       Family Psychiatric History:     Family History   Problem Relation Age of Onset   • Completed Suicide  Mother    • Cancer Father        History Review:  The following portions of the patient's history were reviewed and updated as appropriate: allergies, current medications, past family history, past medical history, past social history, past surgical history and problem list          OBJECTIVE:     Vital signs in last 24 hours:    Vitals:    02/08/23 1132   BP: 119/73   BP Location: Right arm   Patient Position: Sitting   Cuff Size: Standard   Pulse: (!) 50       Mental Status Evaluation:    Appearance age appropriate, casually dressed   Behavior cooperative, calm   Speech normal rate, normal volume, normal pitch   Mood improved, anxious   Affect normal range and intensity, appropriate   Thought Processes organized, goal directed   Associations intact associations   Thought Content no overt delusions   Perceptual Disturbances: no auditory hallucinations, no visual hallucinations   Abnormal Thoughts  Risk Potential Suicidal ideation - None  Homicidal ideation - None  Potential for aggression - No   Orientation oriented to person, place, time/date and situation   Memory recent and remote memory grossly intact   Consciousness alert and awake   Attention Span Concentration Span attention span and concentration are age appropriate   Intellect appears to be of average intelligence   Insight intact   Judgement intact   Muscle Strength and  Gait normal muscle strength and normal muscle tone, normal gait and normal balance   Motor activity no abnormal movements   Language no difficulty naming common objects, no difficulty repeating a phrase, no difficulty writing a sentence   Fund of Knowledge adequate knowledge of current events  adequate fund of knowledge regarding past history  adequate fund of knowledge regarding vocabulary    Pain none   Pain Scale 0       Laboratory Results: I have personally reviewed all pertinent laboratory/tests results    Most Recent Labs: No results found for: WBC, RBC, HGB, HCT, PLT, RDW, NEUTROABS, NA, K, CL, CO2, BUN, CREATININE, GLUCOSE, CALCIUM, AST, ALT, ALKPHOS, PROT, BILITOT, CHOL, CHOLESTEROL, TRIG, HDL, LDLCALC, NONHDLC, VALPROICTOT, CARBAMAZEPIN, LITHIUM, AMMONIA, [id number], FREET4, T3FREE, PREGTESTUR, PREGSERUM, HCG, HCGQUANT, RPR    Suicide/Homicide Risk Assessment:    Risk of Harm to Self:  The following ratings are based on assessment at the time of the interview  Based on today's assessment, Odell Dry presents the following risk of harm to self: minimal    Risk of Harm to Others: The following ratings are based on assessment at the time of the interview  Based on today's assessment, Odell Tse presents the following risk of harm to others: minimal    The following interventions are recommended: no intervention changes needed    Assessment/Plan:  Henry Olivares is 81years old adult male patient was seen today for her scheduled follow-up for management of his insomnia, anxiety and depression  Anxiety and depression were partially responding to Lexapro so we added Wellbutrin however patient is not tolerating Wellbutrin and does not feel it is helpful and prefers to take higher dose of Lexapro  We discontinued Wellbutrin and maximize Lexapro to 20 mg  we will try it for his insomnia ramelteon but failed and had the best results combining Belsomra with Tylenol PM and we will continue that  I suggested using clonidine at bedtime as it will be helpful for anxiety, insomnia and high blood pressure   I referred the patient for depression group therapy as he still on the waiting list for individual therapy  Diagnoses and all orders for this visit:    Insomnia, unspecified type  -     Suvorexant 10 MG TABS; Take 1 tablet (10 mg total) by mouth daily at bedtime  -     cloNIDine (Catapres) 0 1 mg tablet; Take 1 tablet (0 1 mg total) by mouth every 12 (twelve) hours Hold medication if blood pressure lower than 90/60    CHARISMA (generalized anxiety disorder)  -     cloNIDine (Catapres) 0 1 mg tablet;  Take 1 tablet (0 1 mg total) by mouth every 12 (twelve) hours Hold medication if blood pressure lower than 90/60    Other orders  -     Bromfenac Sodium, Once-Daily, 0 09 % SOLN; INSTILL 1 DROP INTO RIGHT EYE TWICE A DAY          Treatment Recommendations/Precautions:      Medication changes: I am having Brandon Mata maintain his fluticasone, ibuprofen, timolol, atorvastatin, esomeprazole, Prolensa, predniSONE, Aspirin-Acetaminophen-Caffeine (EXCEDRIN MIGRAINE PO), montelukast, diphenhydrAMINE-acetaminophen, albuterol, Flovent HFA, latanoprost, Suvorexant, buPROPion, cloNIDine, ergocalciferol, escitalopram, and Bromfenac Sodium (Once-Daily)      Current Outpatient Medications:   •  albuterol (PROVENTIL HFA,VENTOLIN HFA) 90 mcg/act inhaler, , Disp: , Rfl:   •  Aspirin-Acetaminophen-Caffeine (EXCEDRIN MIGRAINE PO), Take 1 tablet by mouth daily as needed, Disp: , Rfl:   •  atorvastatin (LIPITOR) 40 mg tablet, Take 1 tablet by mouth daily, Disp: , Rfl:   •  Bromfenac Sodium, Once-Daily, 0 09 % SOLN, INSTILL 1 DROP INTO RIGHT EYE TWICE A DAY, Disp: , Rfl:   •  diphenhydrAMINE-acetaminophen (TYLENOL PM)  MG TABS, Take 1 tablet by mouth daily at bedtime as needed for sleep, Disp: , Rfl:   •  ergocalciferol (VITAMIN D2) 50,000 units, Take 1 capsule (50,000 Units total) by mouth once a week for 12 doses, Disp: 12 capsule, Rfl: 0  •  escitalopram (LEXAPRO) 20 mg tablet, TAKE 1 TABLET BY MOUTH EVERY DAY, Disp: 90 tablet, Rfl: 1  •  esomeprazole (NexIUM) 40 MG capsule, Take 40 mg by mouth, Disp: , Rfl:   •  Flovent  MCG/ACT inhaler, Inhale 2 puffs 2 (two) times a day, Disp: , Rfl:   •  latanoprost (XALATAN) 0 005 % ophthalmic solution, INSTILL 1 DROP INTO BOTH EYES AT BEDTIME, Disp: , Rfl:   •  montelukast (SINGULAIR) 10 mg tablet, Take 10 mg by mouth, Disp: , Rfl:   •  Prolensa 0 07 % SOLN, , Disp: , Rfl:   •  Suvorexant 10 MG TABS, Take 1 tablet (10 mg total) by mouth daily at bedtime, Disp: 30 tablet, Rfl: 0  •  timolol (TIMOPTIC) 0 5 % ophthalmic solution, INSTILL ONE DROP INTO BOTH EYES TWICE DAILY, Disp: , Rfl:   •  buPROPion (WELLBUTRIN) 75 mg tablet, Take 1 tablet (75 mg total) by mouth in the morning for 10 days, Disp: 10 tablet, Rfl: 0  • cloNIDine (Catapres) 0 1 mg tablet, Take 1 tablet (0 1 mg total) by mouth daily at bedtime for 10 days, Disp: 10 tablet, Rfl: 0  •  fluticasone (FLONASE) 50 mcg/act nasal spray, 2 sprays into each nostril daily, Disp: , Rfl:   •  ibuprofen (MOTRIN) 600 mg tablet, Take 600 mg by mouth every 6 (six) hours as needed, Disp: , Rfl:   •  predniSONE 10 mg tablet, 4 tabs po qd x 2 days then 3 tabs po qd x 2 days then 2 tabs po qd x 2 days then 1 tab po qd x 2 days (Patient not taking: Reported on 1/11/2023), Disp: 20 tablet, Rfl: 0  Chandler Woods has a current medication list which includes the following prescription(s): albuterol, aspirin-acetaminophen-caffeine, atorvastatin, bromfenac sodium (once-daily), diphenhydramine-acetaminophen, ergocalciferol, escitalopram, esomeprazole, flovent hfa, latanoprost, montelukast, prolensa, suvorexant, timolol, bupropion, clonidine, fluticasone, ibuprofen, and prednisone  Aware of 24 hour and weekend coverage for urgent situations accessed by calling [facility] main practice number    Medications Risks/Benefits      Risks, Benefits And Possible Side Effects Of Medications:    Risks, benefits, and possible side effects of medications explained to Chandler Woods and he verbalizes understanding and agreement for treatment  Controlled Medication Discussion:     Chandler Woods has been filling controlled prescriptions on time as prescribed according to [address] [place] [address] Monitoring Program    Psychotherapy Provided:     Individual psychotherapy provided: Yes  Counseling was provided during the session today for 45 minutes  Medications, treatment progress and treatment plan reviewed with Chandler Woods  Medication changes discussed with Chandler Woods  Medication education provided to Chandler Woods  Importance of medication and treatment compliance reviewed with Chandler Woods  Educated on importance of medication and treatment compliance    Importance of follow up with family physician for medical issues reviewed with Alessio Shearer  Discussed with Alessio Shearer acceptance of mental illness diagnosis and need for ongoing psychiatric treatment  Importance of follow up for substance abuse issues discussed with Alessio Shearer  Supportive therapy provided  Cognitive therapy was utilized during the session  Reassurance and supportive therapy provided  Reoriented to reality and reassured  Treatment Plan:    Completed and signed during the session: Not applicable - Treatment Plan not due at this session    Note Share:     This note was not shared with the patient due to reasonable likelihood of causing patient harm    Visit start and stop times:    Start Time: 10:15 AM  Stop Time: 11:00 AM    I spent 45 minutes directly with the patient during this visit    Harry Peck MD 02/08/23

## 2023-02-16 ENCOUNTER — DOCTOR'S OFFICE (OUTPATIENT)
Dept: URBAN - NONMETROPOLITAN AREA CLINIC 1 | Facility: CLINIC | Age: 83
Setting detail: OPHTHALMOLOGY
End: 2023-02-16
Payer: COMMERCIAL

## 2023-02-16 DIAGNOSIS — H04.122: ICD-10-CM

## 2023-02-16 DIAGNOSIS — H40.1113: ICD-10-CM

## 2023-02-16 DIAGNOSIS — H04.121: ICD-10-CM

## 2023-02-16 DIAGNOSIS — H35.371: ICD-10-CM

## 2023-02-16 DIAGNOSIS — H35.81: ICD-10-CM

## 2023-02-16 DIAGNOSIS — H40.1121: ICD-10-CM

## 2023-02-16 PROCEDURE — 99213 OFFICE O/P EST LOW 20 MIN: CPT | Performed by: OPHTHALMOLOGY

## 2023-02-16 PROCEDURE — 92134 CPTRZ OPH DX IMG PST SGM RTA: CPT | Performed by: OPHTHALMOLOGY

## 2023-02-16 PROCEDURE — 83861 MICROFLUID ANALY TEARS: CPT | Performed by: OPHTHALMOLOGY

## 2023-02-16 ASSESSMENT — REFRACTION_MANIFEST
OD_AXIS: 115
OS_ADD: +2.50
OS_VA2: 20/20
OS_SPHERE: +0.25
OS_VA1: 20/20
OD_ADD: +2.50
OS_CYLINDER: -1.25
OD_VA2: 20/30-2
OU_VA: 20/20
OD_SPHERE: -1.00
OS_AXIS: 090
OD_VA1: 20/30-2
OD_CYLINDER: -0.25

## 2023-02-16 ASSESSMENT — KERATOMETRY
OS_K1POWER_DIOPTERS: 45.75
OD_K2POWER_DIOPTERS: 46.00
OS_AXISANGLE_DEGREES: 120
OD_K1POWER_DIOPTERS: 45.75
OS_K2POWER_DIOPTERS: 46.00
OD_AXISANGLE_DEGREES: 011

## 2023-02-16 ASSESSMENT — AXIALLENGTH_DERIVED
OD_AL: 23.1188
OS_AL: 22.8873
OD_AL: 23.1657
OS_AL: 23.0257

## 2023-02-16 ASSESSMENT — VISUAL ACUITY
OD_BCVA: 20/25
OS_BCVA: 20/25-2

## 2023-02-16 ASSESSMENT — REFRACTION_AUTOREFRACTION
OS_SPHERE: -0.25
OD_CYLINDER: 0.00
OS_CYLINDER: -1.00
OS_AXIS: 093
OD_AXIS: 180
OD_SPHERE: -1.00

## 2023-02-16 ASSESSMENT — REFRACTION_CURRENTRX
OD_OVR_VA: 20/
OD_SPHERE: -0.50
OD_VPRISM_DIRECTION: PROGS
OS_ADD: +2.50
OD_ADD: +2.50
OS_OVR_VA: 20/
OS_AXIS: 86
OS_CYLINDER: -1.50
OS_VPRISM_DIRECTION: PROGS
OD_CYLINDER: -0.25
OD_AXIS: 111
OS_SPHERE: +0.50

## 2023-02-16 ASSESSMENT — SPHEQUIV_DERIVED
OD_SPHEQUIV: -1
OS_SPHEQUIV: -0.375
OS_SPHEQUIV: -0.75
OD_SPHEQUIV: -1.125

## 2023-02-16 ASSESSMENT — LID EXAM ASSESSMENTS
OD_BLEPHARITIS: RLL RUL
OS_BLEPHARITIS: LLL LUL

## 2023-02-16 ASSESSMENT — CONFRONTATIONAL VISUAL FIELD TEST (CVF)
OD_FINDINGS: FULL
OS_FINDINGS: FULL

## 2023-03-06 ENCOUNTER — TELEPHONE (OUTPATIENT)
Dept: PSYCHIATRY | Facility: CLINIC | Age: 83
End: 2023-03-06

## 2023-03-06 NOTE — TELEPHONE ENCOUNTER
Returned Amish's call  Taylor Luo has been feeling very tired during the day  States he is very active, goes to the gym every day and with the warmer weather he will be going up to the cabin and he needs to be on his game  States the clonidine is working well with helping him sleep but the daytime fog is affecting him  He is wondering if there is something that works as well but will not cause him to feel so tired  Will refer to Dr Joceline Cruz for review

## 2023-03-06 NOTE — TELEPHONE ENCOUNTER
Sae Valle would like to speak with you  He has been having some side effects from medication lately     " feels like I have a hangover in the morning when I wake up"      Feels tired until the afternoon

## 2023-03-07 NOTE — TELEPHONE ENCOUNTER
Spoke with the patient over the phone  Discussed with him that he can take clonidine at bedtime without Tylenol p m  for a few days and try taking Tylenol PM without clonidine for a few days and see if that improves the drowsiness he had in the morning time  Patient reported that clonidine has been improving his blood pressure control and he is sleeping well  Patient will try this recommendations and will let us know if he has any other concerns

## 2023-04-02 DIAGNOSIS — G47.00 INSOMNIA, UNSPECIFIED TYPE: ICD-10-CM

## 2023-04-02 DIAGNOSIS — F41.1 GAD (GENERALIZED ANXIETY DISORDER): ICD-10-CM

## 2023-04-03 ENCOUNTER — DOCTOR'S OFFICE (OUTPATIENT)
Dept: URBAN - NONMETROPOLITAN AREA CLINIC 1 | Facility: CLINIC | Age: 83
Setting detail: OPHTHALMOLOGY
End: 2023-04-03
Payer: COMMERCIAL

## 2023-04-03 DIAGNOSIS — H04.121: ICD-10-CM

## 2023-04-03 DIAGNOSIS — H40.1121: ICD-10-CM

## 2023-04-03 DIAGNOSIS — H35.81: ICD-10-CM

## 2023-04-03 DIAGNOSIS — H35.371: ICD-10-CM

## 2023-04-03 DIAGNOSIS — H40.1113: ICD-10-CM

## 2023-04-03 DIAGNOSIS — H04.122: ICD-10-CM

## 2023-04-03 PROCEDURE — 83861 MICROFLUID ANALY TEARS: CPT | Performed by: OPHTHALMOLOGY

## 2023-04-03 PROCEDURE — 92134 CPTRZ OPH DX IMG PST SGM RTA: CPT | Performed by: OPHTHALMOLOGY

## 2023-04-03 PROCEDURE — 99213 OFFICE O/P EST LOW 20 MIN: CPT | Performed by: OPHTHALMOLOGY

## 2023-04-03 RX ORDER — CLONIDINE HYDROCHLORIDE 0.1 MG/1
0.1 TABLET ORAL EVERY 12 HOURS SCHEDULED
Qty: 180 TABLET | Refills: 1 | Status: SHIPPED | OUTPATIENT
Start: 2023-04-03 | End: 2023-06-02

## 2023-04-03 ASSESSMENT — REFRACTION_AUTOREFRACTION
OS_SPHERE: -0.25
OS_CYLINDER: -1.00
OD_AXIS: 180
OD_CYLINDER: 0.00
OD_SPHERE: -1.00
OS_AXIS: 093

## 2023-04-03 ASSESSMENT — KERATOMETRY
OD_K1POWER_DIOPTERS: 45.75
OD_K2POWER_DIOPTERS: 46.00
OS_K1POWER_DIOPTERS: 45.75
OS_AXISANGLE_DEGREES: 120
OD_AXISANGLE_DEGREES: 011
OS_K2POWER_DIOPTERS: 46.00

## 2023-04-03 ASSESSMENT — REFRACTION_CURRENTRX
OD_AXIS: 111
OD_VPRISM_DIRECTION: PROGS
OS_AXIS: 86
OS_OVR_VA: 20/
OD_OVR_VA: 20/
OD_SPHERE: -0.50
OS_SPHERE: +0.50
OD_CYLINDER: -0.25
OD_ADD: +2.50
OS_VPRISM_DIRECTION: PROGS
OS_ADD: +2.50
OS_CYLINDER: -1.50

## 2023-04-03 ASSESSMENT — AXIALLENGTH_DERIVED
OD_AL: 23.1188
OD_AL: 23.1657
OS_AL: 22.8873
OS_AL: 23.0257

## 2023-04-03 ASSESSMENT — REFRACTION_MANIFEST
OD_VA1: 20/30-2
OS_VA2: 20/20
OS_CYLINDER: -1.25
OU_VA: 20/20
OS_ADD: +2.50
OD_AXIS: 115
OD_ADD: +2.50
OS_AXIS: 090
OD_CYLINDER: -0.25
OD_VA2: 20/30-2
OS_VA1: 20/20
OD_SPHERE: -1.00
OS_SPHERE: +0.25

## 2023-04-03 ASSESSMENT — SPHEQUIV_DERIVED
OD_SPHEQUIV: -1.125
OD_SPHEQUIV: -1
OS_SPHEQUIV: -0.75
OS_SPHEQUIV: -0.375

## 2023-04-03 ASSESSMENT — LID EXAM ASSESSMENTS
OD_BLEPHARITIS: RLL RUL
OS_BLEPHARITIS: LLL LUL

## 2023-04-03 ASSESSMENT — CONFRONTATIONAL VISUAL FIELD TEST (CVF)
OD_FINDINGS: FULL
OS_FINDINGS: FULL

## 2023-04-03 ASSESSMENT — VISUAL ACUITY
OD_BCVA: 20/20
OS_BCVA: 20/25

## 2023-04-07 ENCOUNTER — PATIENT MESSAGE (OUTPATIENT)
Dept: PSYCHIATRY | Facility: CLINIC | Age: 83
End: 2023-04-07

## 2023-04-17 ENCOUNTER — DOCTOR'S OFFICE (OUTPATIENT)
Dept: URBAN - NONMETROPOLITAN AREA CLINIC 1 | Facility: CLINIC | Age: 83
Setting detail: OPHTHALMOLOGY
End: 2023-04-17
Payer: COMMERCIAL

## 2023-04-17 DIAGNOSIS — H40.1113: ICD-10-CM

## 2023-04-17 DIAGNOSIS — H40.61X3: ICD-10-CM

## 2023-04-17 DIAGNOSIS — H35.371: ICD-10-CM

## 2023-04-17 DIAGNOSIS — H35.81: ICD-10-CM

## 2023-04-17 DIAGNOSIS — H40.1121: ICD-10-CM

## 2023-04-17 PROCEDURE — 99213 OFFICE O/P EST LOW 20 MIN: CPT | Performed by: OPHTHALMOLOGY

## 2023-04-17 PROCEDURE — 92250 FUNDUS PHOTOGRAPHY W/I&R: CPT | Performed by: OPHTHALMOLOGY

## 2023-04-17 PROCEDURE — 92235 FLUORESCEIN ANGRPH MLTIFRAME: CPT | Performed by: OPHTHALMOLOGY

## 2023-04-17 ASSESSMENT — REFRACTION_AUTOREFRACTION
OS_SPHERE: -0.25
OS_CYLINDER: -1.00
OD_AXIS: 180
OS_AXIS: 093
OD_SPHERE: -1.00
OD_CYLINDER: 0.00

## 2023-04-17 ASSESSMENT — KERATOMETRY
OD_K1POWER_DIOPTERS: 45.75
OS_K1POWER_DIOPTERS: 45.75
OD_AXISANGLE_DEGREES: 011
OS_K2POWER_DIOPTERS: 46.00
OD_K2POWER_DIOPTERS: 46.00
OS_AXISANGLE_DEGREES: 120

## 2023-04-17 ASSESSMENT — REFRACTION_CURRENTRX
OS_SPHERE: +0.50
OS_OVR_VA: 20/
OD_AXIS: 111
OS_VPRISM_DIRECTION: PROGS
OD_ADD: +2.50
OS_AXIS: 86
OS_CYLINDER: -1.50
OD_SPHERE: -0.50
OD_CYLINDER: -0.25
OD_OVR_VA: 20/
OD_VPRISM_DIRECTION: PROGS
OS_ADD: +2.50

## 2023-04-17 ASSESSMENT — VISUAL ACUITY
OD_BCVA: 20/20-2
OS_BCVA: 20/25+1

## 2023-04-17 ASSESSMENT — AXIALLENGTH_DERIVED
OD_AL: 23.1657
OS_AL: 22.8873
OD_AL: 23.1188
OS_AL: 23.0257

## 2023-04-17 ASSESSMENT — CONFRONTATIONAL VISUAL FIELD TEST (CVF)
OS_FINDINGS: FULL
OD_FINDINGS: FULL

## 2023-04-17 ASSESSMENT — REFRACTION_MANIFEST
OS_CYLINDER: -1.25
OS_SPHERE: +0.25
OS_VA1: 20/20
OD_ADD: +2.50
OS_AXIS: 090
OD_SPHERE: -1.00
OD_VA2: 20/30-2
OS_VA2: 20/20
OD_AXIS: 115
OU_VA: 20/20
OD_CYLINDER: -0.25
OS_ADD: +2.50
OD_VA1: 20/30-2

## 2023-04-17 ASSESSMENT — LID EXAM ASSESSMENTS
OD_BLEPHARITIS: RLL RUL
OS_BLEPHARITIS: LLL LUL

## 2023-04-17 ASSESSMENT — SPHEQUIV_DERIVED
OD_SPHEQUIV: -1.125
OD_SPHEQUIV: -1
OS_SPHEQUIV: -0.75
OS_SPHEQUIV: -0.375

## 2023-06-05 ENCOUNTER — DOCTOR'S OFFICE (OUTPATIENT)
Dept: URBAN - NONMETROPOLITAN AREA CLINIC 1 | Facility: CLINIC | Age: 83
Setting detail: OPHTHALMOLOGY
End: 2023-06-05
Payer: COMMERCIAL

## 2023-06-05 DIAGNOSIS — H43.812: ICD-10-CM

## 2023-06-05 DIAGNOSIS — H04.121: ICD-10-CM

## 2023-06-05 DIAGNOSIS — H04.122: ICD-10-CM

## 2023-06-05 DIAGNOSIS — H40.1113: ICD-10-CM

## 2023-06-05 DIAGNOSIS — H40.1121: ICD-10-CM

## 2023-06-05 DIAGNOSIS — H40.61X3: ICD-10-CM

## 2023-06-05 DIAGNOSIS — H35.81: ICD-10-CM

## 2023-06-05 DIAGNOSIS — H35.371: ICD-10-CM

## 2023-06-05 PROCEDURE — 99214 OFFICE O/P EST MOD 30 MIN: CPT | Performed by: OPHTHALMOLOGY

## 2023-06-05 PROCEDURE — 92134 CPTRZ OPH DX IMG PST SGM RTA: CPT | Performed by: OPHTHALMOLOGY

## 2023-06-05 ASSESSMENT — SPHEQUIV_DERIVED
OD_SPHEQUIV: -1
OS_SPHEQUIV: -0.375
OS_SPHEQUIV: -0.75
OD_SPHEQUIV: -1.125

## 2023-06-05 ASSESSMENT — REFRACTION_MANIFEST
OD_SPHERE: -1.00
OD_VA2: 20/30-2
OD_AXIS: 115
OS_AXIS: 090
OS_ADD: +2.50
OS_SPHERE: +0.25
OU_VA: 20/20
OS_VA1: 20/20
OS_CYLINDER: -1.25
OD_VA1: 20/30-2
OD_CYLINDER: -0.25
OS_VA2: 20/20
OD_ADD: +2.50

## 2023-06-05 ASSESSMENT — AXIALLENGTH_DERIVED
OD_AL: 23.1657
OS_AL: 22.8873
OS_AL: 23.0257
OD_AL: 23.1188

## 2023-06-05 ASSESSMENT — REFRACTION_AUTOREFRACTION
OD_AXIS: 180
OD_SPHERE: -1.00
OD_CYLINDER: 0.00
OS_SPHERE: -0.25
OS_CYLINDER: -1.00
OS_AXIS: 093

## 2023-06-05 ASSESSMENT — REFRACTION_CURRENTRX
OS_VPRISM_DIRECTION: PROGS
OD_ADD: +2.50
OS_ADD: +2.50
OS_AXIS: 86
OS_CYLINDER: -1.50
OD_SPHERE: -0.50
OS_SPHERE: +0.50
OD_VPRISM_DIRECTION: PROGS
OD_CYLINDER: -0.25
OS_OVR_VA: 20/
OD_AXIS: 111
OD_OVR_VA: 20/

## 2023-06-05 ASSESSMENT — KERATOMETRY
OS_K2POWER_DIOPTERS: 46.00
OD_AXISANGLE_DEGREES: 011
OS_K1POWER_DIOPTERS: 45.75
OD_K2POWER_DIOPTERS: 46.00
OD_K1POWER_DIOPTERS: 45.75
OS_AXISANGLE_DEGREES: 120

## 2023-06-05 ASSESSMENT — CONFRONTATIONAL VISUAL FIELD TEST (CVF)
OD_FINDINGS: FULL
OS_FINDINGS: FULL

## 2023-06-05 ASSESSMENT — VISUAL ACUITY
OD_BCVA: 20/20-2
OS_BCVA: 20/25

## 2023-06-05 ASSESSMENT — LID EXAM ASSESSMENTS
OS_BLEPHARITIS: LLL LUL
OD_BLEPHARITIS: RLL RUL

## 2023-06-12 ENCOUNTER — OFFICE VISIT (OUTPATIENT)
Dept: PSYCHIATRY | Facility: CLINIC | Age: 83
End: 2023-06-12
Payer: MEDICARE

## 2023-06-12 VITALS — DIASTOLIC BLOOD PRESSURE: 72 MMHG | HEART RATE: 45 BPM | SYSTOLIC BLOOD PRESSURE: 146 MMHG

## 2023-06-12 DIAGNOSIS — F33.0 MAJOR DEPRESSIVE DISORDER, RECURRENT, MILD (HCC): Primary | ICD-10-CM

## 2023-06-12 DIAGNOSIS — F41.1 GAD (GENERALIZED ANXIETY DISORDER): ICD-10-CM

## 2023-06-12 DIAGNOSIS — R53.83 FATIGUE, UNSPECIFIED TYPE: ICD-10-CM

## 2023-06-12 DIAGNOSIS — E55.9 VITAMIN D DEFICIENCY: ICD-10-CM

## 2023-06-12 DIAGNOSIS — G47.00 INSOMNIA, UNSPECIFIED TYPE: ICD-10-CM

## 2023-06-12 PROCEDURE — 90833 PSYTX W PT W E/M 30 MIN: CPT | Performed by: STUDENT IN AN ORGANIZED HEALTH CARE EDUCATION/TRAINING PROGRAM

## 2023-06-12 PROCEDURE — 99214 OFFICE O/P EST MOD 30 MIN: CPT | Performed by: STUDENT IN AN ORGANIZED HEALTH CARE EDUCATION/TRAINING PROGRAM

## 2023-06-12 RX ORDER — CLONIDINE HYDROCHLORIDE 0.1 MG/1
0.1 TABLET ORAL EVERY 12 HOURS SCHEDULED
Qty: 180 TABLET | Refills: 0 | Status: SHIPPED | OUTPATIENT
Start: 2023-06-12 | End: 2023-09-10

## 2023-06-12 RX ORDER — DULOXETIN HYDROCHLORIDE 30 MG/1
30 CAPSULE, DELAYED RELEASE ORAL
Qty: 30 CAPSULE | Refills: 0 | Status: SHIPPED | OUTPATIENT
Start: 2023-06-12 | End: 2023-07-12

## 2023-06-12 RX ORDER — CETIRIZINE HYDROCHLORIDE 5 MG/1
5 TABLET ORAL DAILY
COMMUNITY

## 2023-06-12 RX ORDER — MULTIVIT-MIN/IRON/FOLIC ACID/K 18-600-40
50 CAPSULE ORAL DAILY
Qty: 90 CAPSULE | Refills: 2 | Status: SHIPPED | OUTPATIENT
Start: 2023-06-12 | End: 2023-09-10

## 2023-06-12 NOTE — PSYCH
MEDICATION MANAGEMENT NOTE        ST  [facility]      Name and Date of Birth:  Kerry Albarran 81 y o  1940 MRN: [id number]    Date of Visit: June 12, 2023    Reason for Visit:   Chief Complaint   Patient presents with   • Follow-up       SUBJECTIVE:    Rosa Craven is seen today for a follow up for Generalized Anxiety Disorder and insomnia  He continues to do relatively well since the last visit  He reports that on and off anxiety symptoms have been fairly stable, reports Fatigue in the morning time  Patient is currently on Lexapro 20 mg and Clonidine 0 1-0 2 mg at bedtime  He reported that clonidine has been working well for his insomnia and is no longer taking Belsomra which was very expensive and not effective as clonidine  He complains from being tired in the morning time typically between 6 or 7 AM until 9-10 AM   However he is functions are not impaired due to his fatigue and he is able to perform on his daily activities after that  Patient had elevated blood pressure and bradycardia today however he reports having normal blood pressure readings at home and his heart is bradycardic due to being athletic  He is still concerned about his fatigue and inability to have full energy levels in the morning time  He tried taking Lexapro at different times and tried to hold the clonidine this year that his medications are causing fatigue but the results were the same  He wondered if she can take Wellbutrin again but after going back to the notes in December and January he developed headaches after starting Wellbutrin and was discontinued  We gave him vitamin D replacement for 3 months to correct vitamin D deficiency  Sleep study did not show sleep apnea and his blood work is unremarkable except for prediabetes  He denies any suicidal ideation, intent or plan at present; denies any homicidal ideation, intent or plan at present      He denies any auditory hallucinations, denies any visual hallucinations, denies any overt delusions  He denies any side effects from medications        HPI ROS Appetite Changes and Sleep:     He reports adequate number of sleep hours, normal appetite, decreased energy      Review Of Systems:      Constitutional feeling tired   ENT negative   Cardiovascular negative   Respiratory cough and wheezing   Gastrointestinal negative   Genitourinary negative   Musculoskeletal negative   Integumentary negative   Neurological headache   Endocrine negative   Other Symptoms none, all other systems are negative         Past Medical History:    Past Medical History:   Diagnosis Date   • GERD (gastroesophageal reflux disease)    • Glaucoma    • High cholesterol         Past Surgical History:   Procedure Laterality Date   • EYE SURGERY     • HERNIA REPAIR       Allergies   Allergen Reactions   • Cat Hair Extract Cough   • Horse-Derived Products Cough   • Pollen Extract Cough       Substance Abuse History:    Social History     Substance and Sexual Activity   Alcohol Use Not Currently     Social History     Substance and Sexual Activity   Drug Use Never       Social History:    Social History     Socioeconomic History   • Marital status: Unknown     Spouse name: Not on file   • Number of children: Not on file   • Years of education: Not on file   • Highest education level: Not on file   Occupational History   • Not on file   Tobacco Use   • Smoking status: Never   • Smokeless tobacco: Never   Vaping Use   • Vaping Use: Never used   Substance and Sexual Activity   • Alcohol use: Not Currently   • Drug use: Never   • Sexual activity: Not Currently   Other Topics Concern   • Not on file   Social History Narrative   • Not on file     Social Determinants of Health     Financial Resource Strain: Medium Risk (10/19/2022)    Overall Financial Resource Strain (CARDIA)    • Difficulty of Paying Living Expenses: Somewhat hard   Food Insecurity: Not on file Transportation Needs: Not on file   Physical Activity: Not on file   Stress: Not on file   Social Connections: Not on file   Intimate Partner Violence: Not on file   Housing Stability: Not on file       Family Psychiatric History:     Family History   Problem Relation Age of Onset   • Completed Suicide  Mother    • Cancer Father        History Review:  The following portions of the patient's history were reviewed and updated as appropriate: allergies, current medications, past family history, past medical history, past social history, past surgical history and problem list          OBJECTIVE:     Vital signs in last 24 hours:    Vitals:    06/12/23 1049   BP: 146/72   BP Location: Right arm   Patient Position: Sitting   Cuff Size: Standard   Pulse: (!) 45       Mental Status Evaluation:    Appearance age appropriate, casually dressed   Behavior cooperative, calm   Speech normal rate, normal volume, normal pitch   Mood improved, anxious   Affect normal range and intensity, appropriate   Thought Processes organized, goal directed   Associations intact associations   Thought Content no overt delusions   Perceptual Disturbances: no auditory hallucinations, no visual hallucinations   Abnormal Thoughts  Risk Potential Suicidal ideation - None  Homicidal ideation - None  Potential for aggression - No   Orientation oriented to person, place, time/date and situation   Memory recent and remote memory grossly intact   Consciousness alert and awake   Attention Span Concentration Span attention span and concentration are age appropriate   Intellect appears to be of average intelligence   Insight intact   Judgement intact   Muscle Strength and  Gait normal muscle strength and normal muscle tone, normal gait and normal balance   Motor activity no abnormal movements   Language no difficulty naming common objects, no difficulty repeating a phrase, no difficulty writing a sentence   Fund of Knowledge adequate knowledge of current events  adequate fund of knowledge regarding past history  adequate fund of knowledge regarding vocabulary    Pain none   Pain Scale 0       Laboratory Results: I have personally reviewed all pertinent laboratory/tests results    Most Recent Labs:    Collected 4/11/2023  8:32 AM          Component Ref Range & Units 2 mo ago   Hemoglobin A1C <5 7 % 5 8 High     Comment: Reference Range   Non-diabetic                     <5 7   Pre-diabetic                     5 7-6 4   Diabetic                         >=6 5   ADA target for diabetic control  <=7   eAG, EST AVG Glucose mg/dL 120         View Full Report        Specimen Collected: 04/11/23  8:32 AM    Performed By: [facility] CORE [facility] ([facility]) Last Resulted: 04/11/23  3:14 PM   Received From: [facility]  Result Received: 06/12/23 10:24 AM        Received Information                TSH, 3RD GENERATION  Order: [id number]   suggestion  Result Information displayed in this report will not trend and may not trigger automated decision support  Ref Range & Units 2 mo ago   Thyroid Stimulating Hormone 0 45 - 5 33 uIU/mL 2 04      Specimen Collected: 04/11/23  8:32 AM    Performed by: [facility] ([facility]) Last Resulted: 04/11/23  2:16 PM   Received From: [facility]  Result Received: 06/12/23 10:24 AM     Received Information  LIPID PANEL  Order: [id number]   suggestion  Result Information displayed in this report will not trend and may not trigger automated decision support  Ref Range & Units 2 mo ago   Cholesterol <200 mg/dL 167    Triglyceride <150 mg/dL 149    Cholesterol, HDL, Direct 23 - 92 mg/dL 52    Cholesterol, Non-HDL <160 mg/dL 115    Cholesterol, LDL, Calculated <130 mg/dL 85    Comment: LDL Cholesterol was calculated using the Friedewald equation  Direct measurement of LDL is not indicated for this patient based on [facility]'s analytical algorithm for measurement of LDL Cholesterol     CHOL/HDL Ratio  3 2      Specimen Collected: 04/11/23  8:32 AM    Performed by: [facility] ([facility]) Last Resulted: 04/11/23  2:20 PM   Received From: [facility]  Result Received: 06/12/23 10:24 AM     Received Information   Contains abnormal data COMPREHENSIVE METABOLIC PANEL  Order: [id number]   suggestion  Result Information displayed in this report will not trend and may not trigger automated decision support  Ref Range & Units 2 mo ago   Glucose 65 - 99 mg/dL 121 High     BUN 7 - 28 mg/dL 26    Creatinine 0 53 - 1 30 mg/dL 0 95    Sodium 135 - 145 mmol/L 141    Potassium 3 5 - 5 2 mmol/L 4 4    Chloride 100 - 109 mmol/L 102    Carbon Dioxide 21 - 31 mmol/L 32 High     Calcium 8 5 - 10 1 mg/dL 9 1    Alkaline Phosphatase 35 - 120 U/L 56    Albumin 3 5 - 5 7 g/dL 3 8    Bilirubin, Total 0 2 - 1 0 mg/dL 0 7    Comment: Eltrombopag and its metabolites may interfere with this assay causing erroneously high patient results  Protein, Total 6 3 - 8 3 g/dL 6 4    AST <41 U/L 18    ALT <56 U/L 25    Anion Gap 3 - 11 7    eGFRcr >59 80    eGFRcr Comment  Interpretive information: calculated GFR    Comment:                                            Units: mL/min per 1 73 square meters   Reported eGFR is based on the CKD-EPI 2021 creatinine equation that does not use a race coefficient and conforms to the NKF-ASN Task Force Recommendations     Note: Calculated GFR may not be an accurate indicator of renal function if the patient's renal function is not in a steady state                                               GFR Categories in Chronic Kidney Disease (CKD):   GFR        GFR (mL/min/1 73   Category:  square meters):  Interpretation:   G1         90 or greater    Normal or high*   G2         60 - 89          Mild decrease*   G3a        45 - 59          Mild to moderate decrease   G3b        30 - 44          Moderate to severe decrease   G4         15 - 29          Severe decrease   G5         14 or less       Kidney failure                                              *In the absence of evidence of kidney damage, neither GFR category G1 or G2 fulfill the criteria for CKD  Kidney Int Suppl 2013, 3: 1-1 50  Specimen Collected: 04/11/23  8:32 AM    Performed by: [facility] CORE LAB ([facility]1) Last Resulted: 04/11/23  2:20 PM   Received From: [facility]  Result Received: 06/12/23 10:24 AM     Received Information  FASTING STATUS  Order: [id number]   suggestion  Information displayed in this report may not trend or trigger automated decision support  Component 2 mo ago   Fasting Status:  Fasting      Specimen Collected: 04/11/23  8:32 AM    Performed by: [facility] LAB MEDICINE Last Resulted: 04/11/23  1:11 PM   Received From: [facility]  Result Received: 06/12/23 10:24 AM     Received Information    Suicide/Homicide Risk Assessment:    Risk of Harm to Self:  The following ratings are based on assessment at the time of the interview  Based on today's assessment, Regla Fuller presents the following risk of harm to self: minimal    Risk of Harm to Others: The following ratings are based on assessment at the time of the interview  Based on today's assessment, Regla Fuller presents the following risk of harm to others: minimal    The following interventions are recommended: no intervention changes needed    Assessment/Plan:  Laura Sousa is 81years old adult male patient was seen today for her scheduled follow-up for management of his insomnia, anxiety and depression  Anxiety and depression were partially responding to Lexapro so we added Wellbutrin however patient is not tolerating Wellbutrin and does not feel it is helpful and prefers to take higher dose of Lexapro  We discontinued Wellbutrin and maximize Lexapro to 20 mg  we tried for his insomnia ramelteon but failed and had the best results with clonidine    We discussed today his morning fatigue and we discussed some cognitive approach to be focused more on the positives and negatives in his life as all of his symptoms improved except that  Also his baseline is being slow in the morning and that may not improve  Discussed with him that other medications that can give him more stimulation in the morning  I discussed with him that we can use Cymbalta to manage his fatigue as Cymbalta is approved for fibromyalgia and fatigue as a predominant symptom mental disorder  We will add Cymbalta to Lexapro for the next month and if patient has good response without side effects we will taper Lexapro off gradually in the following visits  Educated patient on the side effects of Cymbalta  Also discussed with patient that fatigue is nonspecific symptom and has multiple reasons whether medically or psychologically     Diagnoses and all orders for this visit:    Major depressive disorder, recurrent, mild (HCC)    CHARISMA (generalized anxiety disorder)    Fatigue, unspecified type    Insomnia, unspecified type    Other orders  -     cetirizine (ZyrTEC) 5 MG tablet; Take 5 mg by mouth daily  -     dextromethorphan-guaifenesin (MUCINEX DM)  MG per 12 hr tablet; Take 1 tablet by mouth every 12 (twelve) hours          Treatment Recommendations/Precautions:      Medication changes: I am having Tramaine Bolanos maintain his fluticasone, ibuprofen, timolol, atorvastatin, esomeprazole, Prolensa, Aspirin-Acetaminophen-Caffeine (EXCEDRIN MIGRAINE PO), montelukast, diphenhydrAMINE-acetaminophen, albuterol, Flovent HFA, Bromfenac Sodium (Once-Daily), omeprazole, Vitamin D (Cholecalciferol), escitalopram, cloNIDine, cetirizine, and dextromethorphan-guaifenesin      Current Outpatient Medications:   •  albuterol (PROVENTIL HFA,VENTOLIN HFA) 90 mcg/act inhaler, , Disp: , Rfl:   •  Aspirin-Acetaminophen-Caffeine (EXCEDRIN MIGRAINE PO), Take 1 tablet by mouth daily as needed, Disp: , Rfl:   •  atorvastatin (LIPITOR) 40 mg tablet, Take 1 tablet by mouth daily, Disp: , Rfl:   •  Bromfenac Sodium, Once-Daily, 0 09 % SOLN, INSTILL 1 DROP INTO RIGHT EYE TWICE A DAY, Disp: , Rfl:   •  cetirizine (ZyrTEC) 5 MG tablet, Take 5 mg by mouth daily, Disp: , Rfl:   •  dextromethorphan-guaifenesin (MUCINEX DM)  MG per 12 hr tablet, Take 1 tablet by mouth every 12 (twelve) hours, Disp: , Rfl:   •  diphenhydrAMINE-acetaminophen (TYLENOL PM)  MG TABS, Take 1 tablet by mouth daily at bedtime as needed for sleep, Disp: , Rfl:   •  escitalopram (LEXAPRO) 20 mg tablet, Take 1 tablet (20 mg total) by mouth daily, Disp: 90 tablet, Rfl: 1  •  Flovent  MCG/ACT inhaler, Inhale 2 puffs 2 (two) times a day, Disp: , Rfl:   •  omeprazole (PriLOSEC) 40 MG capsule, Take 40 mg by mouth daily, Disp: , Rfl:   •  Prolensa 0 07 % SOLN, , Disp: , Rfl:   •  timolol (TIMOPTIC) 0 5 % ophthalmic solution, INSTILL ONE DROP INTO BOTH EYES TWICE DAILY, Disp: , Rfl:   •  Vitamin D, Cholecalciferol, 50 MCG (2000 UT) CAPS, Take 50 mcg by mouth in the morning, Disp: 90 capsule, Rfl: 2  •  cloNIDine (CATAPRES) 0 1 mg tablet, Take 1 tablet (0 1 mg total) by mouth every 12 (twelve) hours Hold medication if blood pressure lower than 90/60, Disp: 180 tablet, Rfl: 1  •  esomeprazole (NexIUM) 40 MG capsule, Take 40 mg by mouth, Disp: , Rfl:   •  fluticasone (FLONASE) 50 mcg/act nasal spray, 2 sprays into each nostril daily, Disp: , Rfl:   •  ibuprofen (MOTRIN) 600 mg tablet, Take 600 mg by mouth every 6 (six) hours as needed, Disp: , Rfl:   •  montelukast (SINGULAIR) 10 mg tablet, Take 10 mg by mouth (Patient not taking: Reported on 6/12/2023), Disp: , Rfl:   Alessio Avendaño has a current medication list which includes the following prescription(s): albuterol, aspirin-acetaminophen-caffeine, atorvastatin, bromfenac sodium (once-daily), cetirizine, dextromethorphan-guaifenesin, diphenhydramine-acetaminophen, escitalopram, flovent hfa, omeprazole, prolensa, timolol, vitamin d (cholecalciferol), clonidine, esomeprazole, fluticasone, ibuprofen, and montelukast   Aware of 24 hour and weekend coverage for urgent situations accessed by calling [facility] main practice number    Medications Risks/Benefits      Risks, Benefits And Possible Side Effects Of Medications:    Risks, benefits, and possible side effects of medications explained to Blossom Seymour and he verbalizes understanding and agreement for treatment  Controlled Medication Discussion:     Blossom Seymour has been filling controlled prescriptions on time as prescribed according to [address] [employer] Monitoring Program    Psychotherapy Provided:     Individual psychotherapy provided: Yes  Counseling was provided during the session today for 16 minutes  Medications, treatment progress and treatment plan reviewed with Blossom Seymour  Medication changes discussed with Blossom Seymour  Medication education provided to Blossom Seymour  Importance of medication and treatment compliance reviewed with Blossom Seymour  Educated on importance of medication and treatment compliance  Importance of follow up with family physician for medical issues reviewed with Blossom Seymour  Discussed with Blossom Seymour acceptance of mental illness diagnosis and need for ongoing psychiatric treatment  Importance of follow up for substance abuse issues discussed with Blossom Seymour  Supportive therapy provided  Cognitive therapy was utilized during the session  Reassurance and supportive therapy provided  Reoriented to reality and reassured  Treatment Plan:    Completed and signed during the session: Not applicable - Treatment Plan not due at this session    Note Share:     This note was not shared with the patient due to reasonable likelihood of causing patient harm    Visit start and stop times:    Start Time: 10:30 AM  Stop Time: 11:00 AM    I spent 30 minutes directly with the patient during this visit    Nikko Anderson MD 06/12/23

## 2023-06-12 NOTE — BH TREATMENT PLAN
TREATMENT PLAN (Medication Management Only)        [facility]    Name and Date of Birth:  Sabra Maxwell 81 y o  1940  Date of Treatment Plan: June 12, 2023  Diagnosis/Diagnoses:    1  Major depressive disorder, recurrent, mild ([address] )    2  CHARISMA (generalized anxiety disorder)    3  Fatigue, unspecified type    4  Insomnia, unspecified type    5  Vitamin D deficiency      Strengths/Personal Resources for Self-Care: supportive family, supportive friends, taking medications as prescribed, ability to adapt to life changes, ability to communicate needs, ability to communicate well, ability to listen, ability to reason, ability to understand psychiatric illness, average or above intelligence, family ties, financial means, financial security, general fund of knowledge, good physical health, good understanding of illness, independence, motivation for treatment, ability to negotiate basic needs, Islam affiliation, being resoureceful, self-reliance, sense of humor, special hobby/interest, stable employment, strong sung, well educated, willingness to work on problems, work skills  Area/Areas of need (in own words): anxiety symptoms, depressive symptoms  1  Long Term Goal: maintain acceptable anxiety level  Target Date:6 months - 12/12/2023  Person/Persons responsible for completion of goal: [place]  2  Short Term Objective (s) - How will we reach this goal?:   A  Provider new recommended medication/dosage changes and/or continue medication(s): Medication changes: I have discontinued Amish Pulido's montelukast  I am also having him start on DULoxetine   Additionally, I am having him maintain his fluticasone, ibuprofen, timolol, atorvastatin, esomeprazole, Prolensa, Aspirin-Acetaminophen-Caffeine (EXCEDRIN MIGRAINE PO), diphenhydrAMINE-acetaminophen, albuterol, Flovent HFA, Bromfenac Sodium (Once-Daily), omeprazole, escitalopram, cetirizine, dextromethorphan-guaifenesin, cloNIDine, and Vitamin D (Cholecalciferol)     B  Avoid alcohol   C  Attend medication management appointments regularly  Target Date:6 months - 12/12/2023  Person/Persons Responsible for Completion of Goal: Amish  Progress Towards Goals: starting treatment  Treatment Modality: medication management with psychotherapy every 4 weeks  Review due 180 days from date of this plan: 6 months - 12/12/2023  Expected length of service: maintenance  My Physician/PA/NP and I have developed this plan together and I agree to work on the goals and objectives  I understand the treatment goals that were developed for my treatment

## 2023-06-30 ENCOUNTER — TELEPHONE (OUTPATIENT)
Dept: PSYCHIATRY | Facility: CLINIC | Age: 83
End: 2023-06-30

## 2023-06-30 NOTE — TELEPHONE ENCOUNTER
Called Amish back and informed him that if he has been taking Cymbalta for 2 weeks, he can cut his Lexapro down to half a tablet (10 MG daily)  Also informed him he can take his Cymbalta later in the day to see if that helps with his energy level  He will decrease Lexapro dosing and discuss further at upcoming appointment on 7/10

## 2023-06-30 NOTE — TELEPHONE ENCOUNTER
Returned Amish's call  Since starting Cymbalta a couple of weeks ago, Jg August completely lacks energy  States it is worse than before  His moods are good but has no energy at all  He is attributing it to being on both Cymbalta and Lexapro and is wondering if he should try cutting down on the Lexapro at this time  States that tablets are scored  Should he cut them in half and decrease dose from 20 to 10 MG? Will refer to Dr Clayton Everett for review

## 2023-07-10 ENCOUNTER — OFFICE VISIT (OUTPATIENT)
Dept: PSYCHIATRY | Facility: CLINIC | Age: 83
End: 2023-07-10
Payer: MEDICARE

## 2023-07-10 VITALS — HEART RATE: 50 BPM | DIASTOLIC BLOOD PRESSURE: 73 MMHG | SYSTOLIC BLOOD PRESSURE: 137 MMHG

## 2023-07-10 DIAGNOSIS — G47.00 INSOMNIA, UNSPECIFIED TYPE: ICD-10-CM

## 2023-07-10 DIAGNOSIS — F41.1 GAD (GENERALIZED ANXIETY DISORDER): ICD-10-CM

## 2023-07-10 PROCEDURE — 90833 PSYTX W PT W E/M 30 MIN: CPT | Performed by: STUDENT IN AN ORGANIZED HEALTH CARE EDUCATION/TRAINING PROGRAM

## 2023-07-10 PROCEDURE — 99214 OFFICE O/P EST MOD 30 MIN: CPT | Performed by: STUDENT IN AN ORGANIZED HEALTH CARE EDUCATION/TRAINING PROGRAM

## 2023-07-10 RX ORDER — ESCITALOPRAM OXALATE 5 MG/1
5 TABLET ORAL DAILY
Qty: 15 TABLET | Refills: 0 | Status: SHIPPED | OUTPATIENT
Start: 2023-07-10 | End: 2023-07-25

## 2023-07-10 RX ORDER — DULOXETIN HYDROCHLORIDE 60 MG/1
60 CAPSULE, DELAYED RELEASE ORAL DAILY
Qty: 60 CAPSULE | Refills: 0 | Status: SHIPPED | OUTPATIENT
Start: 2023-07-10 | End: 2023-09-08

## 2023-07-10 RX ORDER — CLONIDINE HYDROCHLORIDE 0.1 MG/1
0.1 TABLET ORAL EVERY 12 HOURS SCHEDULED
Qty: 180 TABLET | Refills: 0 | Status: SHIPPED | OUTPATIENT
Start: 2023-07-10 | End: 2023-10-08

## 2023-07-10 NOTE — PSYCH
MEDICATION MANAGEMENT NOTE        [facility] [address] [facility]      Name and Date of Birth:  Kathryn Anaya 81 y.o. 1940 MRN: [id number]    Date of Visit: July 10, 2023    Reason for Visit:   Chief Complaint   Patient presents with   • Follow-up       SUBJECTIVE:    Chanelle Meredith is seen today for a follow up for Generalized Anxiety Disorder and insomnia. He continues to do relatively well since the last visit. He reports that on and off anxiety symptoms have been fairly stable, reports Fatigue in the morning time. Patient is currently on Lexapro 10 mg and Clonidine 0.1-0.2 mg at bedtime and Cymbalta 30 mg. He reported that clonidine has been working well for his insomnia  He is still concerned about his fatigue and inability to have full energy levels in the afternoon time. He denies any suicidal ideation, intent or plan at present; denies any homicidal ideation, intent or plan at present. He denies any auditory hallucinations, denies any visual hallucinations, denies any overt delusions. He denies any side effects from medications.       HPI ROS Appetite Changes and Sleep:     He reports adequate number of sleep hours, normal appetite, decreased energy      Review Of Systems:      Constitutional feeling tired   ENT negative   Cardiovascular negative   Respiratory cough and wheezing   Gastrointestinal negative   Genitourinary negative   Musculoskeletal negative   Integumentary negative   Neurological headache   Endocrine negative   Other Symptoms none, all other systems are negative         Past Medical History:    Past Medical History:   Diagnosis Date   • GERD (gastroesophageal reflux disease)    • Glaucoma    • High cholesterol         Past Surgical History:   Procedure Laterality Date   • EYE SURGERY     • HERNIA REPAIR       Allergies   Allergen Reactions   • Cat Hair Extract Cough   • Horse-Derived Products Cough   • Pollen Extract Cough       Substance Abuse History:    Social History     Substance and Sexual Activity   Alcohol Use Not Currently     Social History     Substance and Sexual Activity   Drug Use Never       Social History:    Social History     Socioeconomic History   • Marital status: Unknown     Spouse name: Not on file   • Number of children: Not on file   • Years of education: Not on file   • Highest education level: Not on file   Occupational History   • Not on file   Tobacco Use   • Smoking status: Never   • Smokeless tobacco: Never   Vaping Use   • Vaping Use: Never used   Substance and Sexual Activity   • Alcohol use: Not Currently   • Drug use: Never   • Sexual activity: Not Currently   Other Topics Concern   • Not on file   Social History Narrative   • Not on file     Social Determinants of Health     Financial Resource Strain: Medium Risk (10/19/2022)    Overall Financial Resource Strain (CARDIA)    • Difficulty of Paying Living Expenses: Somewhat hard   Food Insecurity: Not on file   Transportation Needs: Not on file   Physical Activity: Not on file   Stress: Not on file   Social Connections: Not on file   Intimate Partner Violence: Not on file   Housing Stability: Not on file       Family Psychiatric History:     Family History   Problem Relation Age of Onset   • Completed Suicide  Mother    • Cancer Father        History Review:  The following portions of the patient's history were reviewed and updated as appropriate: allergies, current medications, past family history, past medical history, past social history, past surgical history and problem list.         OBJECTIVE:     Vital signs in last 24 hours:    Vitals:    07/10/23 1044   BP: 137/73   BP Location: Left arm   Patient Position: Sitting   Cuff Size: Standard   Pulse: (!) 50       Mental Status Evaluation:    Appearance age appropriate, casually dressed   Behavior cooperative, calm   Speech normal rate, normal volume, normal pitch   Mood improved, anxious   Affect normal range and intensity, appropriate   Thought Processes organized, goal directed   Associations intact associations   Thought Content no overt delusions   Perceptual Disturbances: no auditory hallucinations, no visual hallucinations   Abnormal Thoughts  Risk Potential Suicidal ideation - None  Homicidal ideation - None  Potential for aggression - No   Orientation oriented to person, place, time/date and situation   Memory recent and remote memory grossly intact   Consciousness alert and awake   Attention Span Concentration Span attention span and concentration are age appropriate   Intellect appears to be of average intelligence   Insight intact   Judgement intact   Muscle Strength and  Gait normal muscle strength and normal muscle tone, normal gait and normal balance   Motor activity no abnormal movements   Language no difficulty naming common objects, no difficulty repeating a phrase, no difficulty writing a sentence   Fund of Knowledge adequate knowledge of current events  adequate fund of knowledge regarding past history  adequate fund of knowledge regarding vocabulary    Pain none   Pain Scale 0       Laboratory Results: I have personally reviewed all pertinent laboratory/tests results    Most Recent Labs:    Collected 4/11/2023  8:32 AM          Component Ref Range & Units 2 mo ago   Hemoglobin A1C <5.7 % 5.8 High     Comment: Reference Range   Non-diabetic                     <5.7   Pre-diabetic                     5.7-6.4   Diabetic                         >=6.5   ADA target for diabetic control  <=7   eAG, EST AVG Glucose mg/dL 120         View Full Report        Specimen Collected: 04/11/23  8:32 AM    Performed By: [facility] CORE LAB (HNL1) Last Resulted: 04/11/23  3:14 PM   Received From: [address] [place] [address]  Result Received: 06/12/23 10:24 AM        Received Information                TSH, 3RD GENERATION  Order: [id number]   suggestion  Result Information displayed in this report will not trend and may not trigger automated decision support. Ref Range & Units 2 mo ago   Thyroid Stimulating Hormone 0.45 - 5.33 uIU/mL 2.04      Specimen Collected: 04/11/23  8:32 AM    Performed by: [facility] CORE LAB (HNL1) Last Resulted: 04/11/23  2:16 PM   Received From: [facility] [employer]  Result Received: 06/12/23 10:24 AM     Received Information  LIPID PANEL  Order: [id number]   suggestion  Result Information displayed in this report will not trend and may not trigger automated decision support. Ref Range & Units 2 mo ago   Cholesterol <200 mg/dL 167    Triglyceride <150 mg/dL 149    Cholesterol, HDL, Direct 23 - 92 mg/dL 52    Cholesterol, Non-HDL <160 mg/dL 115    Cholesterol, LDL, Calculated <130 mg/dL 85    Comment: LDL Cholesterol was calculated using the Friedewald equation. Direct measurement of LDL is not indicated for this patient based on [facility]'s analytical algorithm for measurement of LDL Cholesterol. CHOL/HDL Ratio  3.2      Specimen Collected: 04/11/23  8:32 AM    Performed by: [facility] CORE LAB (HNL1) Last Resulted: 04/11/23  2:20 PM   [facility] [employer]  Result Received: 06/12/23 10:24 AM     Received Information   Contains abnormal data COMPREHENSIVE METABOLIC PANEL  Order: [id number]   suggestion  Result Information displayed in this report will not trend and may not trigger automated decision support. Ref Range & Units 2 mo ago   Glucose 65 - 99 mg/dL 121 High     BUN 7 - 28 mg/dL 26    Creatinine 0.53 - 1.30 mg/dL 0.95    Sodium 135 - 145 mmol/L 141    Potassium 3.5 - 5.2 mmol/L 4.4    Chloride 100 - 109 mmol/L 102    Carbon Dioxide 21 - 31 mmol/L 32 High     Calcium 8.5 - 10.1 mg/dL 9.1    Alkaline Phosphatase 35 - 120 U/L 56    Albumin 3.5 - 5.7 g/dL 3.8    Bilirubin, Total 0.2 - 1.0 mg/dL 0.7    Comment: Eltrombopag and its metabolites may interfere with this assay causing erroneously high patient results.    Protein, Total 6.3 - 8.3 g/dL 6.4    AST <41 U/L 18    ALT <56 U/L 25 Anion Gap 3 - 11 7    eGFRcr >59 80    eGFRcr Comment  Interpretive information: calculated GFR    Comment:                                            Units: mL/min per 1.73 square meters   Reported eGFR is based on the CKD-EPI 2021 creatinine equation that does not use a race coefficient and conforms to the NKF-ASN Task Force Recommendations. Note: Calculated GFR may not be an accurate indicator of renal function if the patient's renal function is not in a steady state.                                              GFR Categories in Chronic Kidney Disease (CKD):   GFR        GFR (mL/min/1.73   Category:  square meters):  Interpretation:   G1         90 or greater    Normal or high*   G2         60 - 89          Mild decrease*   G3a        45 - 59          Mild to moderate decrease   G3b        30 - 44          Moderate to severe decrease   G4         15 - 29          Severe decrease   G5         14 or less       Kidney failure                                              *In the absence of evidence of kidney damage, neither GFR category G1 or G2 fulfill the criteria for CKD. Kidney Int Suppl 2013, 3: 1-1 50. Specimen Collected: 04/11/23  8:32 AM    Performed by: [facility] CORE LAB (HNL1) Last Resulted: 04/11/23  2:20 PM   Received From: [employer]  Result Received: 06/12/23 10:24 AM     Received Information  FASTING STATUS  Order: [id number]   suggestion  Information displayed in this report may not trend or trigger automated decision support.      Component 2 mo ago   Fasting Status:  Fasting      Specimen Collected: 04/11/23  8:32 AM    Performed by: [facility] LAB MEDICINE Last Resulted: 04/11/23  1:11 PM   Received From: [employer]  Result Received: 06/12/23 10:24 AM     Received Information    Suicide/Homicide Risk Assessment:    Risk of Harm to Self:  The following ratings are based on assessment at the time of the interview  Based on today's assessment, Mary Nino presents the following risk of harm to self: minimal    Risk of Harm to Others: The following ratings are based on assessment at the time of the interview  Based on today's assessment, Chanelle Meredith presents the following risk of harm to others: minimal    The following interventions are recommended: no intervention changes needed    Assessment/Plan:  Kathryn Anaya is 81years old adult male patient was seen today for her scheduled follow-up for management of his insomnia, anxiety and depression. Anxiety and depression were partially responding to Lexapro so we added Wellbutrin however patient is not tolerating Wellbutrin and does not feel it is helpful and prefers to take higher dose of Lexapro. We discontinued Wellbutrin and maximize Lexapro to 20 mg. we tried for his insomnia ramelteon but failed and had the best results with clonidine. We discussed today his morning fatigue and we discussed some cognitive approach to be focused more on the positives and negatives in his life as all of his symptoms improved except that. Also his baseline is being slow in the morning and that may not improve. Discussed with him that other medications that can give him more stimulation in the morning. I discussed with him that we can use Cymbalta to manage his fatigue as Cymbalta is approved for fibromyalgia and fatigue as a predominant symptom mental disorder. He started Cymbalta and will continue titrating to a higher dose since no side effects. We will increase Cymbalta to 60 mg and taper off Lexapro by lowering the dose down to 5 mg for the next 2 weeks and then discontinue. Advised patient to check his blood pressure as it can get elevated from Cymbalta     Diagnoses and all orders for this visit:    CHARISMA (generalized anxiety disorder)  -     cloNIDine (CATAPRES) 0.1 mg tablet; Take 1 tablet (0.1 mg total) by mouth every 12 (twelve) hours Hold medication if blood pressure lower than 90/60  -     DULoxetine (CYMBALTA) 60 mg delayed release capsule;  Take 1 capsule (60 mg total) by mouth daily  -     escitalopram (LEXAPRO) 5 mg tablet; Take 1 tablet (5 mg total) by mouth daily for 15 days    Insomnia, unspecified type  -     cloNIDine (CATAPRES) 0.1 mg tablet; Take 1 tablet (0.1 mg total) by mouth every 12 (twelve) hours Hold medication if blood pressure lower than 90/60          Treatment Recommendations/Precautions:      Medication changes: I am having [place] maintain his fluticasone, ibuprofen, timolol, atorvastatin, esomeprazole, Prolensa, Aspirin-Acetaminophen-Caffeine (EXCEDRIN MIGRAINE PO), diphenhydrAMINE-acetaminophen, albuterol, Flovent HFA, Bromfenac Sodium (Once-Daily), omeprazole, escitalopram, cetirizine, dextromethorphan-guaifenesin, cloNIDine, Vitamin D (Cholecalciferol), and DULoxetine.     Current Outpatient Medications:   •  albuterol (PROVENTIL HFA,VENTOLIN HFA) 90 mcg/act inhaler, , Disp: , Rfl:   •  Aspirin-Acetaminophen-Caffeine (EXCEDRIN MIGRAINE PO), Take 1 tablet by mouth daily as needed, Disp: , Rfl:   •  atorvastatin (LIPITOR) 40 mg tablet, Take 1 tablet by mouth daily, Disp: , Rfl:   •  Bromfenac Sodium, Once-Daily, 0.09 % SOLN, INSTILL 1 DROP INTO RIGHT EYE TWICE A DAY, Disp: , Rfl:   •  cetirizine (ZyrTEC) 5 MG tablet, Take 5 mg by mouth daily, Disp: , Rfl:   •  cloNIDine (CATAPRES) 0.1 mg tablet, Take 1 tablet (0.1 mg total) by mouth every 12 (twelve) hours Hold medication if blood pressure lower than 90/60, Disp: 180 tablet, Rfl: 0  •  DULoxetine (CYMBALTA) 30 mg delayed release capsule, Take 1 capsule (30 mg total) by mouth daily after lunch for 15 days, Disp: 15 capsule, Rfl: 0  •  escitalopram (LEXAPRO) 20 mg tablet, Take 1 tablet (20 mg total) by mouth daily (Patient taking differently: Take 10 mg by mouth daily), Disp: 90 tablet, Rfl: 1  •  Flovent  MCG/ACT inhaler, Inhale 2 puffs 2 (two) times a day, Disp: , Rfl:   •  omeprazole (PriLOSEC) 40 MG capsule, Take 40 mg by mouth daily, Disp: , Rfl:   •  Prolensa 0.07 % SOLN, , Disp: , Rfl:   •  timolol (TIMOPTIC) 0.5 % ophthalmic solution, INSTILL ONE DROP INTO BOTH EYES TWICE DAILY, Disp: , Rfl:   •  Vitamin D, Cholecalciferol, 50 MCG (2000 UT) CAPS, Take 50 mcg by mouth in the morning, Disp: 90 capsule, Rfl: 2  •  dextromethorphan-guaifenesin (MUCINEX DM)  MG per 12 hr tablet, Take 1 tablet by mouth every 12 (twelve) hours (Patient not taking: Reported on 7/10/2023), Disp: , Rfl:   •  diphenhydrAMINE-acetaminophen (TYLENOL PM)  MG TABS, Take 1 tablet by mouth daily at bedtime as needed for sleep (Patient not taking: Reported on 7/10/2023), Disp: , Rfl:   •  esomeprazole (NexIUM) 40 MG capsule, Take 40 mg by mouth, Disp: , Rfl:   •  fluticasone (FLONASE) 50 mcg/act nasal spray, 2 sprays into each nostril daily, Disp: , Rfl:   •  ibuprofen (MOTRIN) 600 mg tablet, Take 600 mg by mouth every 6 (six) hours as needed, Disp: , Rfl:   Gricelda Gallardo has a current medication list which includes the following prescription(s): albuterol, aspirin-acetaminophen-caffeine, atorvastatin, bromfenac sodium (once-daily), cetirizine, clonidine, duloxetine, escitalopram, flovent hfa, omeprazole, prolensa, timolol, vitamin d (cholecalciferol), dextromethorphan-guaifenesin, diphenhydramine-acetaminophen, esomeprazole, fluticasone, and ibuprofen. Aware of 24 hour and weekend coverage for urgent situations accessed by calling [facility] main practice number    Medications Risks/Benefits      Risks, Benefits And Possible Side Effects Of Medications:    Risks, benefits, and possible side effects of medications explained to Allybuffy Angelaelliot and he verbalizes understanding and agreement for treatment.     Controlled Medication Discussion:     Gricelda Gallardo has been filling controlled prescriptions on time as prescribed according to [facility] Monitoring Program    Psychotherapy Provided:     Individual psychotherapy provided: Yes  Counseling was provided during the session today for 16 minutes. Medications, treatment progress and treatment plan reviewed with Lauren Perez. Medication changes discussed with Lauren Perez. Medication education provided to Lauren Perez. Importance of medication and treatment compliance reviewed with Lauren Perez. Educated on importance of medication and treatment compliance. Importance of follow up with family physician for medical issues reviewed with Lauren Perez. Discussed with Lauren Perez acceptance of mental illness diagnosis and need for ongoing psychiatric treatment. Importance of follow up for substance abuse issues discussed with Lauren Perez. Supportive therapy provided. Cognitive therapy was utilized during the session. Reassurance and supportive therapy provided. Reoriented to reality and reassured. Treatment Plan:    Completed and signed during the session: Not applicable - Treatment Plan not due at this session    Note Share:     This note was not shared with the patient due to reasonable likelihood of causing patient harm    Visit start and stop times:    Start Time: 10:30 AM  Stop Time: 11:00 AM    I spent 30 minutes directly with the patient during this visit    Kat Heck MD 07/10/23

## 2023-07-17 DIAGNOSIS — F41.1 GAD (GENERALIZED ANXIETY DISORDER): ICD-10-CM

## 2023-07-17 RX ORDER — ESCITALOPRAM OXALATE 5 MG/1
5 TABLET ORAL DAILY
Qty: 90 TABLET | Refills: 1 | OUTPATIENT
Start: 2023-07-17 | End: 2023-08-01

## 2023-07-31 ENCOUNTER — DOCTOR'S OFFICE (OUTPATIENT)
Dept: URBAN - NONMETROPOLITAN AREA CLINIC 1 | Facility: CLINIC | Age: 83
Setting detail: OPHTHALMOLOGY
End: 2023-07-31
Payer: COMMERCIAL

## 2023-07-31 DIAGNOSIS — H40.1113: ICD-10-CM

## 2023-07-31 DIAGNOSIS — H35.81: ICD-10-CM

## 2023-07-31 DIAGNOSIS — H35.371: ICD-10-CM

## 2023-07-31 DIAGNOSIS — H04.121: ICD-10-CM

## 2023-07-31 DIAGNOSIS — H04.122: ICD-10-CM

## 2023-07-31 DIAGNOSIS — H40.1121: ICD-10-CM

## 2023-07-31 PROCEDURE — 92134 CPTRZ OPH DX IMG PST SGM RTA: CPT | Performed by: OPHTHALMOLOGY

## 2023-07-31 PROCEDURE — 99214 OFFICE O/P EST MOD 30 MIN: CPT | Performed by: OPHTHALMOLOGY

## 2023-07-31 PROCEDURE — 83861 MICROFLUID ANALY TEARS: CPT | Performed by: OPHTHALMOLOGY

## 2023-07-31 PROCEDURE — 92202 OPSCPY EXTND ON/MAC DRAW: CPT | Performed by: OPHTHALMOLOGY

## 2023-07-31 ASSESSMENT — REFRACTION_AUTOREFRACTION
OD_CYLINDER: 0.00
OD_SPHERE: -1.00
OS_CYLINDER: -1.00
OD_AXIS: 180
OS_SPHERE: -0.25
OS_AXIS: 093

## 2023-07-31 ASSESSMENT — CONFRONTATIONAL VISUAL FIELD TEST (CVF)
OD_FINDINGS: FULL
OS_FINDINGS: FULL

## 2023-07-31 ASSESSMENT — REFRACTION_MANIFEST
OD_AXIS: 115
OD_CYLINDER: -0.25
OD_VA2: 20/30-2
OU_VA: 20/20
OS_VA1: 20/20
OS_ADD: +2.50
OD_SPHERE: -1.00
OD_ADD: +2.50
OD_VA1: 20/30-2
OS_VA2: 20/20
OS_AXIS: 090
OS_CYLINDER: -1.25
OS_SPHERE: +0.25

## 2023-07-31 ASSESSMENT — LID EXAM ASSESSMENTS
OS_BLEPHARITIS: LLL LUL
OD_BLEPHARITIS: RLL RUL

## 2023-07-31 ASSESSMENT — REFRACTION_CURRENTRX
OD_SPHERE: -0.50
OS_CYLINDER: -1.50
OD_VPRISM_DIRECTION: PROGS
OD_AXIS: 111
OD_CYLINDER: -0.25
OS_OVR_VA: 20/
OS_AXIS: 86
OS_SPHERE: +0.50
OD_ADD: +2.50
OS_VPRISM_DIRECTION: PROGS
OS_ADD: +2.50
OD_OVR_VA: 20/

## 2023-07-31 ASSESSMENT — SPHEQUIV_DERIVED
OD_SPHEQUIV: -1
OS_SPHEQUIV: -0.75
OS_SPHEQUIV: -0.375
OD_SPHEQUIV: -1.125

## 2023-07-31 ASSESSMENT — VISUAL ACUITY
OS_BCVA: 20/30-1
OD_BCVA: 20/20-2

## 2023-08-31 DIAGNOSIS — F41.1 GAD (GENERALIZED ANXIETY DISORDER): ICD-10-CM

## 2023-08-31 RX ORDER — DULOXETIN HYDROCHLORIDE 60 MG/1
60 CAPSULE, DELAYED RELEASE ORAL DAILY
Qty: 90 CAPSULE | Refills: 1 | Status: SHIPPED | OUTPATIENT
Start: 2023-08-31

## 2023-09-18 ENCOUNTER — OFFICE VISIT (OUTPATIENT)
Dept: PSYCHIATRY | Facility: CLINIC | Age: 83
End: 2023-09-18
Payer: MEDICARE

## 2023-09-18 VITALS — DIASTOLIC BLOOD PRESSURE: 71 MMHG | HEART RATE: 47 BPM | SYSTOLIC BLOOD PRESSURE: 159 MMHG

## 2023-09-18 DIAGNOSIS — G47.00 INSOMNIA, UNSPECIFIED TYPE: ICD-10-CM

## 2023-09-18 DIAGNOSIS — F41.1 GAD (GENERALIZED ANXIETY DISORDER): ICD-10-CM

## 2023-09-18 PROCEDURE — 90833 PSYTX W PT W E/M 30 MIN: CPT | Performed by: STUDENT IN AN ORGANIZED HEALTH CARE EDUCATION/TRAINING PROGRAM

## 2023-09-18 PROCEDURE — 99214 OFFICE O/P EST MOD 30 MIN: CPT | Performed by: STUDENT IN AN ORGANIZED HEALTH CARE EDUCATION/TRAINING PROGRAM

## 2023-09-18 RX ORDER — DULOXETIN HYDROCHLORIDE 60 MG/1
120 CAPSULE, DELAYED RELEASE ORAL DAILY
Qty: 90 CAPSULE | Refills: 1 | Status: SHIPPED
Start: 2023-09-18

## 2023-09-18 RX ORDER — AMOXICILLIN AND CLAVULANATE POTASSIUM 875; 125 MG/1; MG/1
TABLET, FILM COATED ORAL
COMMUNITY
Start: 2023-09-12

## 2023-09-18 RX ORDER — CLONIDINE HYDROCHLORIDE 0.1 MG/1
0.1 TABLET ORAL EVERY 12 HOURS SCHEDULED
Qty: 180 TABLET | Refills: 0 | Status: SHIPPED | OUTPATIENT
Start: 2023-09-18 | End: 2023-12-17

## 2023-09-18 NOTE — PSYCH
MEDICATION MANAGEMENT NOTE        [facility] [address] [facility]      Name and Date of Birth:  Lorenza Briceño 81 y.o. 1940 MRN: [id number]    Date of Visit: September 18, 2023    Reason for Visit:   Chief Complaint   Patient presents with   • Follow-up       SUBJECTIVE:    Joey Ramsey is seen today for a follow up for Generalized Anxiety Disorder and insomnia. He continues to do relatively well since the last visit. He reports that on and off anxiety symptoms have been fairly stable, reports That morning fatigue improved. Patient is currently on  Clonidine 0.1-0.2 mg at bedtime and Cymbalta 60 mg for last 2 month. He is no longer on Lexapro. He reported that clonidine has been working well for his insomnia he was complained today about having depressive symptoms in the first 2 hours of the day, he suffers from low mood and negative thoughts. However after he speaks with his wife he feels better the rest of the day. he feels that Cymbalta was working initially well however he reports that Cymbalta effect weaned off in the last few weeks. He denies any suicidal ideation, intent or plan at present; denies any homicidal ideation, intent or plan at present. He denies any auditory hallucinations, denies any visual hallucinations, denies any overt delusions. He denies any side effects from medications.       HPI ROS Appetite Changes and Sleep:     He reports adequate number of sleep hours, normal appetite, normal energy level      Review Of Systems:      Constitutional negative   ENT sinus pain   Cardiovascular negative   Respiratory negative   Gastrointestinal negative   Genitourinary negative   Musculoskeletal negative   Integumentary negative   Neurological headache   Endocrine negative   Other Symptoms none, all other systems are negative         Past Medical History:    Past Medical History:   Diagnosis Date   • GERD (gastroesophageal reflux disease)    • Glaucoma    • High cholesterol         Past Surgical History:   Procedure Laterality Date   • EYE SURGERY     • HERNIA REPAIR       Allergies   Allergen Reactions   • Cat Hair Extract Cough   • Horse-Derived Products Cough   • Pollen Extract Cough       Substance Abuse History:    Social History     Substance and Sexual Activity   Alcohol Use Not Currently     Social History     Substance and Sexual Activity   Drug Use Never       Social History:    Social History     Socioeconomic History   • Marital status: Unknown     Spouse name: Not on file   • Number of children: Not on file   • Years of education: Not on file   • Highest education level: Not on file   Occupational History   • Not on file   Tobacco Use   • Smoking status: Never   • Smokeless tobacco: Never   Vaping Use   • Vaping Use: Never used   Substance and Sexual Activity   • Alcohol use: Not Currently   • Drug use: Never   • Sexual activity: Not Currently   Other Topics Concern   • Not on file   Social History Narrative   • Not on file     Social Determinants of Health     Financial Resource Strain: Medium Risk (10/19/2022)    Overall Financial Resource Strain (CARDIA)    • Difficulty of Paying Living Expenses: Somewhat hard   Food Insecurity: Not on file   Transportation Needs: Not on file   Physical Activity: Not on file   Stress: Not on file   Social Connections: Not on file   Intimate Partner Violence: Not on file   Housing Stability: Not on file       Family Psychiatric History:     Family History   Problem Relation Age of Onset   • Completed Suicide  Mother    • Cancer Father        History Review:  The following portions of the patient's history were reviewed and updated as appropriate: allergies, current medications, past family history, past medical history, past social history, past surgical history and problem list.         OBJECTIVE:     Vital signs in last 24 hours:    Vitals:    09/18/23 1055   BP: 159/71   BP Location: Right arm   Patient Position: Sitting Cuff Size: Standard   Pulse: (!) 47       Mental Status Evaluation:    Appearance age appropriate, casually dressed   Behavior cooperative, calm   Speech normal rate, normal volume, normal pitch   Mood improved, anxious   Affect normal range and intensity, appropriate   Thought Processes organized, goal directed   Associations intact associations   Thought Content no overt delusions   Perceptual Disturbances: no auditory hallucinations, no visual hallucinations   Abnormal Thoughts  Risk Potential Suicidal ideation - None  Homicidal ideation - None  Potential for aggression - No   Orientation oriented to person, place, time/date and situation   Memory recent and remote memory grossly intact   Consciousness alert and awake   Attention Span Concentration Span attention span and concentration are age appropriate   Intellect appears to be of average intelligence   Insight intact   Judgement intact   Muscle Strength and  Gait normal muscle strength and normal muscle tone, normal gait and normal balance   Motor activity no abnormal movements   Language no difficulty naming common objects, no difficulty repeating a phrase, no difficulty writing a sentence   Fund of Knowledge adequate knowledge of current events  adequate fund of knowledge regarding past history  adequate fund of knowledge regarding vocabulary    Pain none   Pain Scale 0       Laboratory Results: I have personally reviewed all pertinent laboratory/tests results    Most Recent Labs:    Collected 4/11/2023  8:32 AM          Component Ref Range & Units 2 mo ago   Hemoglobin A1C <5.7 % 5.8 High     Comment: Reference Range   Non-diabetic                     <5.7   Pre-diabetic                     5.7-6.4   Diabetic                         >=6.5   ADA target for diabetic control  <=7   eAG, EST AVG Glucose mg/dL 120         View Full Report        Specimen Collected: 04/11/23  8:32 AM    Performed By: [facility] ([email or web address]) Last Resulted: 04/11/23  3:14 PM Received From: [facility]  Result Received: 06/12/23 10:24 AM        Received Information                TSH, 3RD GENERATION  Order: [id number]   suggestion  Result Information displayed in this report will not trend and may not trigger automated decision support. Ref Range & Units 2 mo ago   Thyroid Stimulating Hormone 0.45 - 5.33 uIU/mL 2.04      Specimen Collected: 04/11/23  8:32 AM    Performed by: [facility] ([facility]) Last Resulted: 04/11/23  2:16 PM   Received From: [facility]  Result Received: 06/12/23 10:24 AM     Received Information  LIPID PANEL  Order: [id number]   suggestion  Result Information displayed in this report will not trend and may not trigger automated decision support. Ref Range & Units 2 mo ago   Cholesterol <200 mg/dL 167    Triglyceride <150 mg/dL 149    Cholesterol, HDL, Direct 23 - 92 mg/dL 52    Cholesterol, Non-HDL <160 mg/dL 115    Cholesterol, LDL, Calculated <130 mg/dL 85    Comment: LDL Cholesterol was calculated using the Friedewald equation. Direct measurement of LDL is not indicated for this patient based on [facility]'s analytical algorithm for measurement of LDL Cholesterol. CHOL/HDL Ratio  3.2      Specimen Collected: 04/11/23  8:32 AM    Performed by: [facility] ([id number]) Last Resulted: 04/11/23  2:20 PM   Received From: [facility]  Result Received: 06/12/23 10:24 AM     Received Information   Contains abnormal data COMPREHENSIVE METABOLIC PANEL  Order: [id number]   suggestion  Result Information displayed in this report will not trend and may not trigger automated decision support.       Ref Range & Units 2 mo ago   Glucose 65 - 99 mg/dL 121 High     BUN 7 - 28 mg/dL 26    Creatinine 0.53 - 1.30 mg/dL 0.95    Sodium 135 - 145 mmol/L 141    Potassium 3.5 - 5.2 mmol/L 4.4    Chloride 100 - 109 mmol/L 102    Carbon Dioxide 21 - 31 mmol/L 32 High     Calcium 8.5 - 10.1 mg/dL 9.1    Alkaline Phosphatase 35 - 120 U/L 56 Albumin 3.5 - 5.7 g/dL 3.8    Bilirubin, Total 0.2 - 1.0 mg/dL 0.7    Comment: Eltrombopag and its metabolites may interfere with this assay causing erroneously high patient results. Protein, Total 6.3 - 8.3 g/dL 6.4    AST <41 U/L 18    ALT <56 U/L 25    Anion Gap 3 - 11 7    eGFRcr >59 80    eGFRcr Comment  Interpretive information: calculated GFR    Comment:                                            Units: mL/min per 1.73 square meters   Reported eGFR is based on the CKD-EPI 2021 creatinine equation that does not use a race coefficient and conforms to the NKF-ASN Task Force Recommendations. Note: Calculated GFR may not be an accurate indicator of renal function if the patient's renal function is not in a steady state.                                              GFR Categories in Chronic Kidney Disease (CKD):   GFR        GFR (mL/min/1.73   Category:  square meters):  Interpretation:   G1         90 or greater    Normal or high*   G2         60 - 89          Mild decrease*   G3a        45 - 59          Mild to moderate decrease   G3b        30 - 44          Moderate to severe decrease   G4         15 - 29          Severe decrease   G5         14 or less       Kidney failure                                              *In the absence of evidence of kidney damage, neither GFR category G1 or G2 fulfill the criteria for CKD. Kidney Int Suppl 2013, 3: 1-1 50. Specimen Collected: 04/11/23  8:32 AM    Performed by: [facility] ([facility]) Last Resulted: 04/11/23  2:20 PM   Received From: [address] [place] [address]  Result Received: 06/12/23 10:24 AM     Received Information  FASTING STATUS  Order: [id number]   suggestion  Information displayed in this report may not trend or trigger automated decision support.      Component 2 mo ago   Fasting Status:  Fasting      Specimen Collected: 04/11/23  8:32 AM    Performed by: [facility] MEDICINE Last Resulted: 04/11/23  1:11 PM   Received From: [facility]  Result Received: 06/12/23 10:24 AM     Received Information    Suicide/Homicide Risk Assessment:    Risk of Harm to Self:  The following ratings are based on assessment at the time of the interview  Based on today's assessment, Henri Carthage presents the following risk of harm to self: minimal    Risk of Harm to Others: The following ratings are based on assessment at the time of the interview  Based on today's assessment, Henri Cardona presents the following risk of harm to others: minimal    The following interventions are recommended: no intervention changes needed    Assessment/Plan:  Sylvester Cui is 81years old adult male patient was seen today for her scheduled follow-up for management of his insomnia, anxiety and depression. Anxiety and depression were partially responding to Lexapro so we added Wellbutrin however patient is not tolerating Wellbutrin and does not feel it is helpful and prefers to take higher dose of Lexapro. We discontinued Wellbutrin and maximize Lexapro to 20 mg. we tried for his insomnia ramelteon but failed and had the best results with clonidine. In the previous visits we decided to switch him from Lexapro to Cymbalta to target fatigue associated with depression. We will cross taper between Lexapro and Cymbalta and currently is taking 60 mg of Cymbalta with partial response. We will maximize Cymbalta up to 120 mg and is longer taking Lexapro. advised patient to check his blood pressure as it can get elevated from Cymbalta     Diagnoses and all orders for this visit:    CHARISMA (generalized anxiety disorder)  -     DULoxetine (CYMBALTA) 60 mg delayed release capsule; Take 2 capsules (120 mg total) by mouth daily  -     cloNIDine (CATAPRES) 0.1 mg tablet; Take 1 tablet (0.1 mg total) by mouth every 12 (twelve) hours Hold medication if blood pressure lower than 90/60    Insomnia, unspecified type  -     cloNIDine (CATAPRES) 0.1 mg tablet;  Take 1 tablet (0.1 mg total) by mouth every 12 (twelve) hours Hold medication if blood pressure lower than 90/60    Other orders  -     amoxicillin-clavulanate (AUGMENTIN) 875-125 mg per tablet; TAKE 1 TAB BY MOUTH EVERY 12 HOURS          Treatment Recommendations/Precautions:      Medication changes: I am having Chrys Meckel maintain his fluticasone, ibuprofen, timolol, atorvastatin, esomeprazole, Prolensa, Aspirin-Acetaminophen-Caffeine (EXCEDRIN MIGRAINE PO), albuterol, Flovent HFA, Bromfenac Sodium (Once-Daily), omeprazole, cetirizine, cloNIDine, escitalopram, DULoxetine, and amoxicillin-clavulanate.     Current Outpatient Medications:   •  albuterol (PROVENTIL HFA,VENTOLIN HFA) 90 mcg/act inhaler, , Disp: , Rfl:   •  amoxicillin-clavulanate (AUGMENTIN) 875-125 mg per tablet, TAKE 1 TAB BY MOUTH EVERY 12 HOURS, Disp: , Rfl:   •  Aspirin-Acetaminophen-Caffeine (EXCEDRIN MIGRAINE PO), Take 1 tablet by mouth daily as needed, Disp: , Rfl:   •  atorvastatin (LIPITOR) 40 mg tablet, Take 1 tablet by mouth daily, Disp: , Rfl:   •  Bromfenac Sodium, Once-Daily, 0.09 % SOLN, INSTILL 1 DROP INTO RIGHT EYE TWICE A DAY, Disp: , Rfl:   •  cetirizine (ZyrTEC) 5 MG tablet, Take 5 mg by mouth daily, Disp: , Rfl:   •  cloNIDine (CATAPRES) 0.1 mg tablet, Take 1 tablet (0.1 mg total) by mouth every 12 (twelve) hours Hold medication if blood pressure lower than 90/60, Disp: 180 tablet, Rfl: 0  •  DULoxetine (CYMBALTA) 60 mg delayed release capsule, TAKE 1 CAPSULE BY MOUTH EVERY DAY, Disp: 90 capsule, Rfl: 1  •  Flovent  MCG/ACT inhaler, Inhale 2 puffs 2 (two) times a day, Disp: , Rfl:   •  omeprazole (PriLOSEC) 40 MG capsule, Take 40 mg by mouth daily, Disp: , Rfl:   •  Prolensa 0.07 % SOLN, , Disp: , Rfl:   •  timolol (TIMOPTIC) 0.5 % ophthalmic solution, INSTILL ONE DROP INTO BOTH EYES TWICE DAILY, Disp: , Rfl:   •  escitalopram (LEXAPRO) 5 mg tablet, Take 1 tablet (5 mg total) by mouth daily for 15 days, Disp: 15 tablet, Rfl: 0  •  esomeprazole (NexIUM) 40 MG capsule, Take 40 mg by mouth, Disp: , Rfl:   •  fluticasone (FLONASE) 50 mcg/act nasal spray, 2 sprays into each nostril daily, Disp: , Rfl:   •  ibuprofen (MOTRIN) 600 mg tablet, Take 600 mg by mouth every 6 (six) hours as needed, Disp: , Rfl:   Vika Uriel has a current medication list which includes the following prescription(s): albuterol, amoxicillin-clavulanate, aspirin-acetaminophen-caffeine, atorvastatin, bromfenac sodium (once-daily), cetirizine, clonidine, duloxetine, flovent hfa, omeprazole, prolensa, timolol, escitalopram, esomeprazole, fluticasone, and ibuprofen. Aware of 24 hour and weekend coverage for urgent situations accessed by calling [facility] main practice number    Medications Risks/Benefits      Risks, Benefits And Possible Side Effects Of Medications:    Risks, benefits, and possible side effects of medications explained to Vika Trevino and he verbalizes understanding and agreement for treatment. Controlled Medication Discussion:     Vika Trevino has been filling controlled prescriptions on time as prescribed according to [address] [place] [address] Monitoring Program    Psychotherapy Provided:     Individual psychotherapy provided: Yes  Counseling was provided during the session today for 16 minutes. Medications, treatment progress and treatment plan reviewed with Vika Trevino. Medication changes discussed with Vika Trevino. Medication education provided to Vika Trevino. Importance of medication and treatment compliance reviewed with Vika Trevino. Educated on importance of medication and treatment compliance. Importance of follow up with family physician for medical issues reviewed with Vika Trevino. Discussed with Vika Trevino acceptance of mental illness diagnosis and need for ongoing psychiatric treatment. Importance of follow up for substance abuse issues discussed with Vika Trevino. Supportive therapy provided. Cognitive therapy was utilized during the session. Reassurance and supportive therapy provided.    Reoriented to reality and reassured. Treatment Plan:    Completed and signed during the session: Not applicable - Treatment Plan not due at this session    Note Share:     This note was not shared with the patient due to reasonable likelihood of causing patient harm    Visit start and stop times:    Start Time: 10:30 AM  Stop Time: 11:00 AM    I spent 30 minutes directly with the patient during this visit    Ran Page MD 09/18/23

## 2023-09-25 ENCOUNTER — DOCTOR'S OFFICE (OUTPATIENT)
Dept: URBAN - NONMETROPOLITAN AREA CLINIC 1 | Facility: CLINIC | Age: 83
Setting detail: OPHTHALMOLOGY
End: 2023-09-25
Payer: COMMERCIAL

## 2023-09-25 DIAGNOSIS — H04.121: ICD-10-CM

## 2023-09-25 DIAGNOSIS — H35.371: ICD-10-CM

## 2023-09-25 DIAGNOSIS — H35.81: ICD-10-CM

## 2023-09-25 DIAGNOSIS — H04.122: ICD-10-CM

## 2023-09-25 PROCEDURE — 83861 MICROFLUID ANALY TEARS: CPT | Performed by: OPHTHALMOLOGY

## 2023-09-25 PROCEDURE — 92134 CPTRZ OPH DX IMG PST SGM RTA: CPT | Performed by: OPHTHALMOLOGY

## 2023-09-25 PROCEDURE — 99214 OFFICE O/P EST MOD 30 MIN: CPT | Performed by: OPHTHALMOLOGY

## 2023-09-25 ASSESSMENT — REFRACTION_MANIFEST
OD_AXIS: 115
OD_SPHERE: -1.00
OD_CYLINDER: -0.25
OS_SPHERE: +0.25
OS_VA1: 20/20
OS_ADD: +2.50
OD_VA2: 20/30-2
OU_VA: 20/20
OS_AXIS: 090
OS_VA2: 20/20
OS_CYLINDER: -1.25
OD_VA1: 20/30-2
OD_ADD: +2.50

## 2023-09-25 ASSESSMENT — REFRACTION_AUTOREFRACTION
OD_CYLINDER: 0.00
OD_SPHERE: -1.00
OS_AXIS: 093
OS_SPHERE: -0.25
OD_AXIS: 180
OS_CYLINDER: -1.00

## 2023-09-25 ASSESSMENT — REFRACTION_CURRENTRX
OS_AXIS: 86
OS_CYLINDER: -1.50
OD_ADD: +2.50
OD_SPHERE: -0.50
OS_SPHERE: +0.50
OS_VPRISM_DIRECTION: PROGS
OD_OVR_VA: 20/
OS_OVR_VA: 20/
OD_CYLINDER: -0.25
OD_VPRISM_DIRECTION: PROGS
OD_AXIS: 111
OS_ADD: +2.50

## 2023-09-25 ASSESSMENT — KERATOMETRY
OD_K1POWER_DIOPTERS: 45.75
OS_K2POWER_DIOPTERS: 46.00
OD_AXISANGLE_DEGREES: 011
OS_K1POWER_DIOPTERS: 45.75
OD_K2POWER_DIOPTERS: 46.00
OS_AXISANGLE_DEGREES: 120

## 2023-09-25 ASSESSMENT — AXIALLENGTH_DERIVED
OS_AL: 22.8873
OS_AL: 23.0257
OD_AL: 23.1188
OD_AL: 23.1657

## 2023-09-25 ASSESSMENT — VISUAL ACUITY
OS_BCVA: 20/25
OD_BCVA: 20/20-2

## 2023-09-25 ASSESSMENT — SPHEQUIV_DERIVED
OD_SPHEQUIV: -1
OS_SPHEQUIV: -0.375
OD_SPHEQUIV: -1.125
OS_SPHEQUIV: -0.75

## 2023-09-25 ASSESSMENT — LID EXAM ASSESSMENTS
OD_BLEPHARITIS: RLL RUL
OS_BLEPHARITIS: LLL LUL

## 2023-09-25 ASSESSMENT — CONFRONTATIONAL VISUAL FIELD TEST (CVF)
OS_FINDINGS: FULL
OD_FINDINGS: FULL

## 2023-10-16 ENCOUNTER — DOCTOR'S OFFICE (OUTPATIENT)
Dept: URBAN - NONMETROPOLITAN AREA CLINIC 1 | Facility: CLINIC | Age: 83
Setting detail: OPHTHALMOLOGY
End: 2023-10-16
Payer: COMMERCIAL

## 2023-10-16 DIAGNOSIS — H35.371: ICD-10-CM

## 2023-10-16 DIAGNOSIS — H35.81: ICD-10-CM

## 2023-10-16 PROCEDURE — 92250 FUNDUS PHOTOGRAPHY W/I&R: CPT | Performed by: OPHTHALMOLOGY

## 2023-10-16 PROCEDURE — 99213 OFFICE O/P EST LOW 20 MIN: CPT | Performed by: OPHTHALMOLOGY

## 2023-10-16 PROCEDURE — 92235 FLUORESCEIN ANGRPH MLTIFRAME: CPT | Performed by: OPHTHALMOLOGY

## 2023-10-16 ASSESSMENT — AXIALLENGTH_DERIVED
OS_AL: 23.0257
OS_AL: 22.8873
OD_AL: 23.1188
OD_AL: 23.1657

## 2023-10-16 ASSESSMENT — REFRACTION_MANIFEST
OS_VA1: 20/20
OU_VA: 20/20
OS_VA2: 20/20
OD_CYLINDER: -0.25
OS_ADD: +2.50
OS_SPHERE: +0.25
OD_SPHERE: -1.00
OS_CYLINDER: -1.25
OD_VA2: 20/30-2
OS_AXIS: 090
OD_AXIS: 115
OD_ADD: +2.50
OD_VA1: 20/30-2

## 2023-10-16 ASSESSMENT — KERATOMETRY
OS_K2POWER_DIOPTERS: 46.00
OD_K2POWER_DIOPTERS: 46.00
OD_AXISANGLE_DEGREES: 011
OS_AXISANGLE_DEGREES: 120
OD_K1POWER_DIOPTERS: 45.75
OS_K1POWER_DIOPTERS: 45.75

## 2023-10-16 ASSESSMENT — CONFRONTATIONAL VISUAL FIELD TEST (CVF)
OD_FINDINGS: FULL
OS_FINDINGS: FULL

## 2023-10-16 ASSESSMENT — SPHEQUIV_DERIVED
OD_SPHEQUIV: -1
OD_SPHEQUIV: -1.125
OS_SPHEQUIV: -0.375
OS_SPHEQUIV: -0.75

## 2023-10-16 ASSESSMENT — REFRACTION_CURRENTRX
OD_VPRISM_DIRECTION: PROGS
OD_ADD: +2.50
OS_AXIS: 86
OS_OVR_VA: 20/
OS_VPRISM_DIRECTION: PROGS
OS_CYLINDER: -1.50
OS_SPHERE: +0.50
OD_SPHERE: -0.50
OD_CYLINDER: -0.25
OD_OVR_VA: 20/
OD_AXIS: 111
OS_ADD: +2.50

## 2023-10-16 ASSESSMENT — REFRACTION_AUTOREFRACTION
OD_SPHERE: -1.00
OS_AXIS: 093
OS_SPHERE: -0.25
OD_CYLINDER: 0.00
OD_AXIS: 180
OS_CYLINDER: -1.00

## 2023-10-16 ASSESSMENT — LID EXAM ASSESSMENTS
OD_BLEPHARITIS: RLL RUL
OS_BLEPHARITIS: LLL LUL

## 2023-10-16 ASSESSMENT — VISUAL ACUITY
OS_BCVA: 20/30
OD_BCVA: 20/25+2

## 2023-10-31 ENCOUNTER — OFFICE VISIT (OUTPATIENT)
Dept: PSYCHIATRY | Facility: CLINIC | Age: 83
End: 2023-10-31
Payer: MEDICARE

## 2023-10-31 VITALS — DIASTOLIC BLOOD PRESSURE: 67 MMHG | SYSTOLIC BLOOD PRESSURE: 146 MMHG | HEART RATE: 55 BPM

## 2023-10-31 DIAGNOSIS — G47.00 INSOMNIA, UNSPECIFIED TYPE: ICD-10-CM

## 2023-10-31 DIAGNOSIS — F41.1 GAD (GENERALIZED ANXIETY DISORDER): ICD-10-CM

## 2023-10-31 PROCEDURE — 90833 PSYTX W PT W E/M 30 MIN: CPT | Performed by: STUDENT IN AN ORGANIZED HEALTH CARE EDUCATION/TRAINING PROGRAM

## 2023-10-31 PROCEDURE — 99214 OFFICE O/P EST MOD 30 MIN: CPT | Performed by: STUDENT IN AN ORGANIZED HEALTH CARE EDUCATION/TRAINING PROGRAM

## 2023-10-31 RX ORDER — CLONIDINE HYDROCHLORIDE 0.1 MG/1
0.1 TABLET ORAL EVERY 12 HOURS SCHEDULED
Qty: 180 TABLET | Refills: 0 | Status: SHIPPED | OUTPATIENT
Start: 2023-10-31 | End: 2024-01-29

## 2023-10-31 RX ORDER — DULOXETIN HYDROCHLORIDE 60 MG/1
120 CAPSULE, DELAYED RELEASE ORAL DAILY
Qty: 90 CAPSULE | Refills: 1 | Status: SHIPPED | OUTPATIENT
Start: 2023-10-31

## 2023-10-31 NOTE — PSYCH
MEDICATION MANAGEMENT NOTE        [facility]      Name and Date of Birth:  Lorenza Briceño 81 y.o. 1940 MRN: [id number]    Date of Visit: October 31, 2023    Reason for Visit:   Chief Complaint   Patient presents with    Follow-up       SUBJECTIVE:    Joey Ramsey is seen today for a follow up for Generalized Anxiety Disorder and insomnia. He continues to do relatively well since the last visit. He reports that on and off anxiety symptoms have been fairly stable, reports That morning fatigue improved . Patient is currently on  Clonidine 0.1-0.2 mg at bedtime and Cymbalta 60 mg BID for last 2 month. He is no longer on Lexapro. He reported that clonidine has been working well for his insomnia he was complained today about having depressive symptoms in the first 2 hours of the day, he suffers from low mood and negative thoughts. He has a previous visit to increase Cymbalta from 60-1 20 and patient reports improvement of his anxiety and depression but still complains from morning fatigue. However he still able to perform his activities and play exercise on a daily basis    He denies any suicidal ideation, intent or plan at present; denies any homicidal ideation, intent or plan at present. He denies any auditory hallucinations, denies any visual hallucinations, denies any overt delusions. He denies any side effects from medications.       HPI ROS Appetite Changes and Sleep:     He reports adequate number of sleep hours, normal appetite, normal energy level      Review Of Systems:      Constitutional negative   ENT sinus pain   Cardiovascular negative   Respiratory negative   Gastrointestinal negative   Genitourinary negative   Musculoskeletal negative   Integumentary negative   Neurological headache   Endocrine negative   Other Symptoms none, all other systems are negative         Past Medical History:    Past Medical History:   Diagnosis Date    GERD (gastroesophageal reflux disease)     Glaucoma     High cholesterol         Past Surgical History:   Procedure Laterality Date    EYE SURGERY      HERNIA REPAIR       Allergies   Allergen Reactions    Cat Hair Extract Cough    Horse-Derived Products Cough    Pollen Extract Cough       Substance Abuse History:    Social History     Substance and Sexual Activity   Alcohol Use Not Currently     Social History     Substance and Sexual Activity   Drug Use Never       Social History:    Social History     Socioeconomic History    Marital status: Unknown     Spouse name: Not on file    Number of children: Not on file    Years of education: Not on file    Highest education level: Not on file   Occupational History    Not on file   Tobacco Use    Smoking status: Never    Smokeless tobacco: Never   Vaping Use    Vaping Use: Never used   Substance and Sexual Activity    Alcohol use: Not Currently    Drug use: Never    Sexual activity: Not Currently   Other Topics Concern    Not on file   Social History Narrative    Not on file     Social Determinants of Health     Financial Resource Strain: Medium Risk (10/19/2022)    Overall Financial Resource Strain (CARDIA)     Difficulty of Paying Living Expenses: Somewhat hard   Food Insecurity: Not on file   Transportation Needs: Not on file   Physical Activity: Not on file   Stress: Not on file   Social Connections: Not on file   Intimate Partner Violence: Not on file   Housing Stability: Not on file       Family Psychiatric History:     Family History   Problem Relation Age of Onset    Completed Suicide  Mother     Cancer Father        History Review:  The following portions of the patient's history were reviewed and updated as appropriate: allergies, current medications, past family history, past medical history, past social history, past surgical history and problem list.         OBJECTIVE:     Vital signs in last 24 hours:    Vitals:    10/31/23 1054   BP: 146/67   BP Location: Left arm   Patient Position: Sitting   Cuff Size: Standard   Pulse: 55       Mental Status Evaluation:    Appearance age appropriate, casually dressed   Behavior cooperative, calm   Speech normal rate, normal volume, normal pitch   Mood improved, anxious   Affect normal range and intensity, appropriate   Thought Processes organized, goal directed   Associations intact associations   Thought Content no overt delusions   Perceptual Disturbances: no auditory hallucinations, no visual hallucinations   Abnormal Thoughts  Risk Potential Suicidal ideation - None  Homicidal ideation - None  Potential for aggression - No   Orientation oriented to person, place, time/date and situation   Memory recent and remote memory grossly intact   Consciousness alert and awake   Attention Span Concentration Span attention span and concentration are age appropriate   Intellect appears to be of average intelligence   Insight intact   Judgement intact   Muscle Strength and  Gait normal muscle strength and normal muscle tone, normal gait and normal balance   Motor activity no abnormal movements   Language no difficulty naming common objects, no difficulty repeating a phrase, no difficulty writing a sentence   Fund of Knowledge adequate knowledge of current events  adequate fund of knowledge regarding past history  adequate fund of knowledge regarding vocabulary    Pain none   Pain Scale 0       Laboratory Results: I have personally reviewed all pertinent laboratory/tests results    Most Recent Labs:   Collected 4/11/2023  8:32 AM          Component Ref Range & Units 2 mo ago   Hemoglobin A1C <5.7 % 5.8 High     Comment: Reference Range   Non-diabetic                     <5.7   Pre-diabetic                     5.7-6.4   Diabetic                         >=6.5   ADA target for diabetic control  <=7   eAG, EST AVG Glucose mg/dL 120         View Full Report        Specimen Collected: 04/11/23  8:32 AM    Performed By: [facility] ([facility]) Last Resulted: 04/11/23 3:14 PM   Received From: [facility]  Result Received: 06/12/23 10:24 AM       Received Information                TSH, 3RD GENERATION  Order: [id number]   suggestion  Result Information displayed in this report will not trend and may not trigger automated decision support. Ref Range & Units 2 mo ago   Thyroid Stimulating Hormone 0.45 - 5.33 uIU/mL 2.04      Specimen Collected: 04/11/23  8:32 AM    Performed by: [facility] ([facility]1) Last Resulted: 04/11/23  2:16 PM   Received From: [facility]  Result Received: 06/12/23 10:24 AM    Received Information  LIPID PANEL  Order: [id number]   suggestion  Result Information displayed in this report will not trend and may not trigger automated decision support. Ref Range & Units 2 mo ago   Cholesterol <200 mg/dL 167    Triglyceride <150 mg/dL 149    Cholesterol, HDL, Direct 23 - 92 mg/dL 52    Cholesterol, Non-HDL <160 mg/dL 115    Cholesterol, LDL, Calculated <130 mg/dL 85    Comment: LDL Cholesterol was calculated using the Friedewald equation. Direct measurement of LDL is not indicated for this patient based on [facility]'s analytical algorithm for measurement of LDL Cholesterol. CHOL/HDL Ratio  3.2      Specimen Collected: 04/11/23  8:32 AM    Performed by: [facility] ([facility]) Last Resulted: 04/11/23  2:20 PM   Received From: [facility]  Result Received: 06/12/23 10:24 AM    Received Information   Contains abnormal data COMPREHENSIVE METABOLIC PANEL  Order: [id number]   suggestion  Result Information displayed in this report will not trend and may not trigger automated decision support.       Ref Range & Units 2 mo ago   Glucose 65 - 99 mg/dL 121 High     BUN 7 - 28 mg/dL 26    Creatinine 0.53 - 1.30 mg/dL 0.95    Sodium 135 - 145 mmol/L 141    Potassium 3.5 - 5.2 mmol/L 4.4    Chloride 100 - 109 mmol/L 102    Carbon Dioxide 21 - 31 mmol/L 32 High     Calcium 8.5 - 10.1 mg/dL 9.1    Alkaline Phosphatase 35 - 120 U/L 56 Albumin 3.5 - 5.7 g/dL 3.8    Bilirubin, Total 0.2 - 1.0 mg/dL 0.7    Comment: Eltrombopag and its metabolites may interfere with this assay causing erroneously high patient results. Protein, Total 6.3 - 8.3 g/dL 6.4    AST <41 U/L 18    ALT <56 U/L 25    Anion Gap 3 - 11 7    eGFRcr >59 80    eGFRcr Comment  Interpretive information: calculated GFR    Comment:                                            Units: mL/min per 1.73 square meters   Reported eGFR is based on the CKD-EPI 2021 creatinine equation that does not use a race coefficient and conforms to the NKF-ASN Task Force Recommendations. Note: Calculated GFR may not be an accurate indicator of renal function if the patient's renal function is not in a steady state. GFR Categories in Chronic Kidney Disease (CKD):   GFR        GFR (mL/min/1.73   Category:  square meters): Interpretation:   G1         90 or greater    Normal or high*   G2         60 - 89          Mild decrease*   G3a        45 - 59          Mild to moderate decrease   G3b        30 - 44          Moderate to severe decrease   G4         15 - 29          Severe decrease   G5         14 or less       Kidney failure                                              *In the absence of evidence of kidney damage, neither GFR category G1 or G2 fulfill the criteria for CKD. Kidney Int Suppl 2013, 3: 1-1 50. Specimen Collected: 04/11/23  8:32 AM    Performed by: \[facility]\"" [facility] (HNL1) Last Resulted: 04/11/23  2:20 PM   Received From: [facility]  Result Received: 06/12/23 10:24 AM    Received Information  FASTING STATUS  Order: [id number]   suggestion  Information displayed in this report may not trend or trigger automated decision support.      Component 2 mo ago   Fasting Status:  Fasting      Specimen Collected: 04/11/23  8:32 AM    Performed by: \[facility]\"" LAB MEDICINE Last Resulted: 04/11/23  1:11 PM   Received From: [facility] Result Received: 06/12/23 10:24 AM    Received Information    Suicide/Homicide Risk Assessment:    Risk of Harm to Self:  The following ratings are based on assessment at the time of the interview  Based on today's assessment, Carolina Storm presents the following risk of harm to self: minimal    Risk of Harm to Others: The following ratings are based on assessment at the time of the interview  Based on today's assessment, Carolina Storm presents the following risk of harm to others: minimal    The following interventions are recommended: no intervention changes needed    Assessment/Plan:  Raj He is 81years old adult male patient was seen today for her scheduled follow-up for management of his insomnia, anxiety and depression. Anxiety and depression were partially responding to Lexapro so we added Wellbutrin however patient is not tolerating Wellbutrin and does not feel it is helpful and prefers to take higher dose of Lexapro. We discontinued Wellbutrin and maximize Lexapro to 20 mg. we tried for his insomnia ramelteon but failed and had the best results with clonidine. In the previous visits we decided to switch him from Lexapro to Cymbalta to target fatigue associated with depression. We Tapered Lexapro off and currently is taking the maximum dose of Cymbalta and no more changes is required today     Diagnoses and all orders for this visit:    CHARISMA (generalized anxiety disorder)  -     cloNIDine (CATAPRES) 0.1 mg tablet; Take 1 tablet (0.1 mg total) by mouth every 12 (twelve) hours Hold medication if blood pressure lower than 90/60  -     DULoxetine (CYMBALTA) 60 mg delayed release capsule; Take 2 capsules (120 mg total) by mouth daily    Insomnia, unspecified type  -     cloNIDine (CATAPRES) 0.1 mg tablet;  Take 1 tablet (0.1 mg total) by mouth every 12 (twelve) hours Hold medication if blood pressure lower than 90/60            Treatment Recommendations/Precautions:      Medication changes: I am having Raj He maintain his fluticasone, ibuprofen, timolol, atorvastatin, esomeprazole, Prolensa, Aspirin-Acetaminophen-Caffeine (EXCEDRIN MIGRAINE PO), albuterol, Flovent HFA, Bromfenac Sodium (Once-Daily), omeprazole, cetirizine, amoxicillin-clavulanate, DULoxetine, and cloNIDine.     Current Outpatient Medications:     albuterol (PROVENTIL HFA,VENTOLIN HFA) 90 mcg/act inhaler, , Disp: , Rfl:     amoxicillin-clavulanate (AUGMENTIN) 875-125 mg per tablet, TAKE 1 TAB BY MOUTH EVERY 12 HOURS, Disp: , Rfl:     Aspirin-Acetaminophen-Caffeine (EXCEDRIN MIGRAINE PO), Take 1 tablet by mouth daily as needed, Disp: , Rfl:     atorvastatin (LIPITOR) 40 mg tablet, Take 1 tablet by mouth daily, Disp: , Rfl:     Bromfenac Sodium, Once-Daily, 0.09 % SOLN, INSTILL 1 DROP INTO RIGHT EYE TWICE A DAY, Disp: , Rfl:     cetirizine (ZyrTEC) 5 MG tablet, Take 5 mg by mouth daily, Disp: , Rfl:     cloNIDine (CATAPRES) 0.1 mg tablet, Take 1 tablet (0.1 mg total) by mouth every 12 (twelve) hours Hold medication if blood pressure lower than 90/60, Disp: 180 tablet, Rfl: 0    DULoxetine (CYMBALTA) 60 mg delayed release capsule, Take 2 capsules (120 mg total) by mouth daily, Disp: 90 capsule, Rfl: 1    Flovent  MCG/ACT inhaler, Inhale 2 puffs 2 (two) times a day, Disp: , Rfl:     omeprazole (PriLOSEC) 40 MG capsule, Take 40 mg by mouth daily, Disp: , Rfl:     Prolensa 0.07 % SOLN, , Disp: , Rfl:     timolol (TIMOPTIC) 0.5 % ophthalmic solution, INSTILL ONE DROP INTO BOTH EYES TWICE DAILY, Disp: , Rfl:     esomeprazole (NexIUM) 40 MG capsule, Take 40 mg by mouth, Disp: , Rfl:     fluticasone (FLONASE) 50 mcg/act nasal spray, 2 sprays into each nostril daily, Disp: , Rfl:     ibuprofen (MOTRIN) 600 mg tablet, Take 600 mg by mouth every 6 (six) hours as needed, Disp: , Rfl:   Den Parisi has a current medication list which includes the following prescription(s): albuterol, amoxicillin-clavulanate, aspirin-acetaminophen-caffeine, atorvastatin, bromfenac sodium (once-daily), cetirizine, clonidine, duloxetine, flovent hfa, omeprazole, prolensa, timolol, esomeprazole, fluticasone, and ibuprofen. Aware of 24 hour and weekend coverage for urgent situations accessed by calling [facility] main practice number    Medications Risks/Benefits      Risks, Benefits And Possible Side Effects Of Medications:    Risks, benefits, and possible side effects of medications explained to Javon Frost and he verbalizes understanding and agreement for treatment. Controlled Medication Discussion:     Javon Frost has been filling controlled prescriptions on time as prescribed according to [address] [place] [address] Monitoring Program    Psychotherapy Provided:     Individual psychotherapy provided: Yes  Counseling was provided during the session today for 16 minutes. Medications, treatment progress and treatment plan reviewed with Javon Frost. Medication changes discussed with Javon Frost. Medication education provided to Javon Frost. Importance of medication and treatment compliance reviewed with Javon Frost. Educated on importance of medication and treatment compliance. Importance of follow up with family physician for medical issues reviewed with Javon Frost. Discussed with Javon Frost acceptance of mental illness diagnosis and need for ongoing psychiatric treatment. Importance of follow up for substance abuse issues discussed with Javon Frost. Supportive therapy provided. Cognitive therapy was utilized during the session. Reassurance and supportive therapy provided. Reoriented to reality and reassured. Treatment Plan:    Completed and signed during the session: Not applicable - Treatment Plan not due at this session    Note Share:     This note was not shared with the patient due to reasonable likelihood of causing patient harm    Visit start and stop times:    Start Time:  10:45 AM  Stop Time:  11:15 AM    I spent 30 minutes directly with the patient during this visit    Amber Chavez MD 10/31/23

## 2023-11-09 ENCOUNTER — EVALUATION (OUTPATIENT)
Dept: PHYSICAL THERAPY | Facility: CLINIC | Age: 83
End: 2023-11-09
Payer: MEDICARE

## 2023-11-09 DIAGNOSIS — M77.8 TENDONITIS OF SHOULDER, LEFT: Primary | ICD-10-CM

## 2023-11-09 DIAGNOSIS — M25.512 ACUTE PAIN OF LEFT SHOULDER: ICD-10-CM

## 2023-11-09 PROCEDURE — 97535 SELF CARE MNGMENT TRAINING: CPT

## 2023-11-09 PROCEDURE — 97161 PT EVAL LOW COMPLEX 20 MIN: CPT

## 2023-11-09 PROCEDURE — 97110 THERAPEUTIC EXERCISES: CPT

## 2023-11-09 NOTE — PROGRESS NOTES
PT Evaluation     Today's date: 2023  Patient name: Raj Trevino  : 1940  MRN: [id number]  Referring provider: Wayne Cortez MD  Dx:   Encounter Diagnosis     ICD-10-CM    1. Tendonitis of shoulder, left  M77.8       2. Acute pain of left shoulder  M25.512           Start Time: 1515  Stop Time: 1615  Total time in clinic (min): 60 minutes    Assessment  Assessment details: Patient is a 81 y.o. male with medical diagnosis of left shoulder tendinitis and pain. Following a thorough physical therapy evaluation, the patient demonstrates objective impairments in L sided scapular strength and L shoulder strength (specifically ER). Pain is limiting him from participating at the gym and from fly fishing. History of proximal biceps tear notable c/ some tenderness just superior to muscle belly, but differential diagnosis of infraspinatus tendinosis also relevant considering FATEMEH and objective presentation. Educated regarding shoulder anatomy including muscles and tendons relevant to findings. Provided patient c/ handout c/ HEP. Patient demonstrated each exercise c/ good form and verbalized understanding of expectations c/ HEP. Patient will benefit from skilled physical therapy treatment to address the aforementioned impairments and functional deficits, accomplish patient goals and [facility] to [facility]. Impairments: abnormal or restricted ROM, activity intolerance, impaired physical strength, lacks appropriate home exercise program, pain with function, scapular dyskinesis, poor posture  and poor body mechanics    Goals  STG (3 weeks): L Shoulder ROM WNL. Improve L [address] [place] ER Strength by 1/2 grade. LTG (6 weeks): L Shoulder >=4+/5 Strength. L Scap Strength >=4+/5.   80% improvement in left shoulder pain. Patient will be independent with HEP in 6 weeks to allow independent management of condition.       Plan  Plan details: TE, NMR, TA, MT, self-care, and modalities as needed in order to progress through skilled PT focused on ROM, strength, posture, motor control. Patient would benefit from: skilled physical therapy  Planned modality interventions: cryotherapy and thermotherapy: hydrocollator packs  Planned therapy interventions: manual therapy, neuromuscular re-education, patient education, self care, therapeutic activities, therapeutic exercise and home exercise program  Frequency: 2x week  Duration in weeks: 6  Plan of Care beginning date: 2023  Plan of Care expiration date: 2023  Treatment plan discussed with: patient        Subjective Evaluation    History of Present Illness  Mechanism of injury: IE: Patient is a 81 y.o. male presenting with left shoulder pain. Symptoms began 3-4 weeks ago, was fly fishing and it started to bother him. Noticed it also was hurting c/ lifting up to 90*. Has been resting it and feels it has improved, but is fearful of returning to fly [place] and aggravating it again. Was going to the gym several times a week, but has rested from that as well. TENS and ice at home help symptoms slightly. Patients primary goals for PT are to be able to decrease pain and return fly fishing and the gym.    Patient Goals  Patient goals for therapy: increased strength, decreased pain, return to sport/leisure activities and increased motion  Patient goal: return to fly fishing and gym  Pain  Current pain ratin  At best pain ratin  At worst pain ratin  Quality: dull ache and sharp  Relieving factors: ice  Aggravating factors: overhead activity and lifting  Progression: improved    Hand dominance: left          Objective    Observation:     -Scapular Stability: Stab, HABD, Scap   -R: 5/5, 4+/5, 4+/5   -L: 4+/5, 4/5, 4-/5     TTP: just inferior to bicipital groove, anterior to deltoid insertion     Shoulder ROM (degrees):               IE (R/L)  FLX:   145/140   ABD:   150/136  ER:     65/61  IR:      T7/T7     Shoulder Strength (MMT Grades):               IE (R/L)  FLX:     4+/4  ABD: 4+/4-  ER:      4+/3+  IR:        5/4+     Shoulder Special Tests (L):  Kandy's Bubba: (-)  Empty Can: (-)  Dominga: (-)        Precautions: None      Daily Treatment Diary:      Initial Evaluation Date: 11/09/23  POC Expiration: 12/21/23  Compliance 11/09/23                     Visit Number 1                    Re-Eval  IE                 [address]   to [place]                         Date 11/9       Manuals        Shoulder PROM        GH/Lateral Distraction        Post/Inferior [place]        STM to R Biceps Tendon                        Neuro Re-Ed        SA Punches        Push-up Plus nv       Bilateral ER nv       Seated T-spine Ext        [place]: Row, Ext, HABD, 90/90 ER, Y nv               Ther Ex        Posterior Capsule Stretch        Sleeper Stretch        S/Lying ER & ABD nv       Standing TB ER/IR nv       Standing TB Row & Ext BlTB 3/10       UBE: UE Muscular Endurance        Education Shoulder Anatomy, Mechanics               Ther Activity                        Modalities        CP                 Self Care: Provided patient c/ handout c/ HEP. Patient demonstrated each exercise c/ good form and verbalized understanding of expectations c/ HEP. Access Code: [address] [place] [address]  URL: [email or web address]  Date: 11/09/2023  Prepared by: Thi Laguna    Exercises  - Standing Shoulder Row with Anchored Resistance  - 1 x daily - 7 x weekly - 3 sets - 10 reps  - Shoulder extension with resistance - Neutral  - 1 x daily - 3 sets - 10 reps

## 2023-11-09 NOTE — LETTER
2023    Bonita Granados MD  [address] [place] [address],[address] [address]    Patient: Valerie Bennett   YOB: 1940   Date of Visit: 2023     Encounter Diagnosis     ICD-10-CM    1. Tendonitis of shoulder, left  M77.8       2. Acute pain of left shoulder  M25.512           Dear Dr. Chloé Spicer: Thank you for your recent referral of Valerie Bennett. Please review the attached evaluation summary from Amish's recent visit. Please verify that you agree with the plan of care by signing the attached order. If you have any questions or concerns, please do not hesitate to call. I sincerely appreciate the opportunity to share in the care of one of your patients and hope to have another opportunity to work with you in the near future. Sincerely,    Sakshi No, PT      Referring Provider:      I certify that I have read the below Plan of Care and certify the need for these services furnished under this plan of treatment while under my care. Bonita Granados MD  [address] [place] [address],[address] [address]  Via Fax: [phone]          PT Evaluation     Today's date: 2023  Patient name: Valerie Bennett  : 1940  MRN: [id number]  Referring provider: Bonita Granados MD  Dx:   Encounter Diagnosis     ICD-10-CM    1. Tendonitis of shoulder, left  M77.8       2. Acute pain of left shoulder  M25.512           Start Time: 1515  Stop Time: 1615  Total time in clinic (min): 60 minutes    Assessment  Assessment details: Patient is a 81 y.o. male with medical diagnosis of left shoulder tendinitis and pain. Following a thorough physical therapy evaluation, the patient demonstrates objective impairments in L sided scapular strength and L shoulder strength (specifically ER). Pain is limiting him from participating at the gym and from fly fishing.  History of proximal biceps tear notable c/ some tenderness just superior to muscle belly, but differential diagnosis of infraspinatus tendinosis also relevant considering FATEMEH and objective presentation. Educated regarding shoulder anatomy including muscles and tendons relevant to findings. Provided patient c/ handout c/ HEP. Patient demonstrated each exercise c/ good form and verbalized understanding of expectations c/ HEP. Patient will benefit from skilled physical therapy treatment to address the aforementioned impairments and functional deficits, accomplish patient goals and return patient to OF. Impairments: abnormal or restricted ROM, activity intolerance, impaired physical strength, lacks appropriate home exercise program, pain with function, scapular dyskinesis, poor posture  and poor body mechanics    Goals  STG (3 weeks): L Shoulder ROM WNL. Improve L [address] [place] ER Strength by 1/2 grade. LTG (6 weeks): L Shoulder >=4+/5 Strength. L Scap Strength >=4+/5.   80% improvement in left shoulder pain. Patient will be independent with HEP in 6 weeks to allow independent management of condition. Plan  Plan details: TE, NMR, TA, MT, self-care, and modalities as needed in order to progress through skilled PT focused on ROM, strength, posture, motor control. Patient would benefit from: skilled physical therapy  Planned modality interventions: cryotherapy and thermotherapy: hydrocollator packs  Planned therapy interventions: manual therapy, neuromuscular re-education, patient education, self care, therapeutic activities, therapeutic exercise and home exercise program  Frequency: 2x week  Duration in weeks: 6  Plan of Care beginning date: 11/9/2023  Plan of Care expiration date: 12/21/2023  Treatment plan discussed with: patient        Subjective Evaluation    History of Present Illness  Mechanism of injury: IE: Patient is a 81 y.o. male presenting with left shoulder pain. Symptoms began 3-4 weeks ago, was fly fishing and it started to bother him. Noticed it also was hurting c/ lifting up to 90*.  Has been resting it and feels it has improved, but is fearful of returning to fly [employer] and aggravating it again. Was going to the gym several times a week, but has rested from that as well. TENS and ice at home help symptoms slightly. Patients primary goals for PT are to be able to decrease pain and return fly [employer] and the gym.    Patient Goals  Patient goals for therapy: increased strength, decreased pain, return to sport/leisure activities and increased motion  Patient goal: return to fly fishing and gym  Pain  Current pain ratin  At best pain ratin  At worst pain ratin  Quality: dull ache and sharp  Relieving factors: ice  Aggravating factors: overhead activity and lifting  Progression: improved    Hand dominance: left          Objective    Observation:     -Scapular Stability: Stab, HABD, Scap   -R: 5/5, 4+/5, 4+/5   -L: 4+/5, 4/5, 4-/5     TTP: just inferior to bicipital groove, anterior to deltoid insertion     Shoulder ROM (degrees):               IE (R/L)  FLX:   145/140   ABD:   150/136  ER:     65/61  IR:      T7/T7     Shoulder Strength (MMT Grades):               IE (R/L)  FLX:     4+/4  ABD:    4+/4-  ER:      4+/3+  IR:        5/4+     Shoulder Special Tests (L):  Kandy's Bubba: (-)  Empty Can: (-)  Dominga: (-)        Precautions: None      Daily Treatment Diary:      Initial Evaluation Date: 23  POC Expiration: 23  Compliance 23                     Visit Number 1                    Re-Eval  IE                 [facility]   Foto Captured Y                         Date        Manuals        Shoulder PROM        GH/Lateral Distraction        Post/Inferior [place]        STM to R Biceps Tendon                        Neuro Re-Ed        SA Punches        Push-up Plus nv       Bilateral ER nv       Seated T-spine Ext        [place]: Row, Ext, HABD, 90/90 ER, Y nv               Ther Ex        Posterior Capsule Stretch        Sleeper Stretch        S/Lying ER & ABD nv       Standing TB ER/IR nv       Standing TB Row & Ext BlTB 3/10       UBE: UE Muscular Endurance        Education Shoulder Anatomy, Mechanics               Ther Activity                        Modalities        CP                 Self Care: Provided patient c/ handout c/ HEP. Patient demonstrated each exercise c/ good form and verbalized understanding of expectations c/ HEP. Access Code: [address] [place] [address]  URL: [email or web address]  Date: 11/09/2023  Prepared by: Del Salmon    Exercises  - Standing Shoulder Row with Anchored Resistance  - 1 x daily - 7 x weekly - 3 sets - 10 reps  - Shoulder extension with resistance - Neutral  - 1 x daily - 3 sets - 10 reps

## 2023-11-13 ENCOUNTER — OFFICE VISIT (OUTPATIENT)
Dept: PHYSICAL THERAPY | Facility: CLINIC | Age: 83
End: 2023-11-13
Payer: MEDICARE

## 2023-11-13 DIAGNOSIS — M25.512 ACUTE PAIN OF LEFT SHOULDER: ICD-10-CM

## 2023-11-13 DIAGNOSIS — M77.8 TENDONITIS OF SHOULDER, LEFT: Primary | ICD-10-CM

## 2023-11-13 PROCEDURE — 97112 NEUROMUSCULAR REEDUCATION: CPT

## 2023-11-13 PROCEDURE — 97140 MANUAL THERAPY 1/> REGIONS: CPT

## 2023-11-13 PROCEDURE — 97110 THERAPEUTIC EXERCISES: CPT

## 2023-11-13 NOTE — PROGRESS NOTES
Daily Note     Today's date: 2023  Patient name: Chrys Meckel  : 1940  MRN: [id number]  Referring provider: Foster Yoon MD  Dx:   Encounter Diagnosis     ICD-10-CM    1. Tendonitis of shoulder, left  M77.8       2. Acute pain of left shoulder  M25.512           Start Time: 1000  Stop Time: 1050  Total time in clinic (min): 50 minutes    Subjective: Reports he had some muscle soreness c/ HEP, but otherwise pain consistent in scaption at 90* as it was in IE. Objective: See treatment diary below      Assessment: Tolerated treatment well. Patient demonstrated fatigue post treatment, exhibited good technique with therapeutic exercises, and would benefit from continued PT. ER's fatigued c/ added exercises. Educated patient regarding typical post-exercise soreness and how to assess symptoms post tx. Plan: Continue per plan of care. Precautions: None      Daily Treatment Diary:      Initial Evaluation Date: 23  POC Expiration: 23  Compliance 23                   Visit Number 1 2                   Re-Eval  IE                 [facility]   Foto Captured Y                         Date       Manuals        Shoulder PROM  KS      GH/Lateral Distraction        Posterior [place]  KS Gr II      STM to R Biceps Tendon                        Neuro Re-Ed        SA Punches        Push-up Plus nv 3/10ea      Bilateral ER nv np      Seated T-spine Ext        [place]: Row, Ext, HABD, 90/90 ER, Y nv 5# Row & Ext 3/10ea              Ther Ex        Posterior Capsule Stretch        Sleeper Stretch        S/Lying ER & ABD nv 3# 3/10ea ER only      Standing TB ER/IR nv GTB 3/10      Standing TB Row & Ext BlTB 3/10 nv      UBE: UE Muscular Endurance        Education Shoulder Anatomy, Mechanics               Ther Activity                        Modalities        CP                     Access Code: [email or web address]  URL: [email or web address]  Date: 2023  Prepared by: Sandrita Man Smith    Exercises  - Standing Shoulder Row with Anchored Resistance  - 1 x daily - 7 x weekly - 3 sets - 10 reps  - Shoulder extension with resistance - Neutral  - 1 x daily - 3 sets - 10 reps

## 2023-11-15 ENCOUNTER — OFFICE VISIT (OUTPATIENT)
Dept: PHYSICAL THERAPY | Facility: CLINIC | Age: 83
End: 2023-11-15
Payer: MEDICARE

## 2023-11-15 DIAGNOSIS — M77.8 TENDONITIS OF SHOULDER, LEFT: Primary | ICD-10-CM

## 2023-11-15 DIAGNOSIS — M25.512 ACUTE PAIN OF LEFT SHOULDER: ICD-10-CM

## 2023-11-15 PROCEDURE — 97110 THERAPEUTIC EXERCISES: CPT

## 2023-11-15 PROCEDURE — 97140 MANUAL THERAPY 1/> REGIONS: CPT

## 2023-11-15 PROCEDURE — 97112 NEUROMUSCULAR REEDUCATION: CPT

## 2023-11-15 NOTE — PROGRESS NOTES
Daily Note     Today's date: 11/15/2023  Patient name: Carla Pineda  : 1940  MRN: [id number]  Referring provider: Shasta Villalpando MD  Dx:   Encounter Diagnosis     ICD-10-CM    1. Tendonitis of shoulder, left  M77.8       2. Acute pain of left shoulder  M25.512           Start Time: 1000  Stop Time: 1045  Total time in clinic (min): 45 minutes    Subjective: Reports he feels movement in his right shoulder is improving. Gets sore in scapular muscles after HEP, but notes it more like he did a hard workout soreness. Objective: See treatment diary below      Assessment: Tolerated treatment well. Patient demonstrated fatigue post treatment, exhibited good technique with therapeutic exercises, and would benefit from continued PT. Improved pain-free shoulder PROM and AROM following manual therapy mobilizations. Provided updated HEP and reviewed technique c/ patient for completion at home. Patient verbalized understanding of education provided. Plan: Continue per plan of care.       Precautions: None      Daily Treatment Diary:      Initial Evaluation Date: 23  POC Expiration: 23  Compliance 11/09/23  11/13  11/15                 Visit Number 1 2  3                 Re-Eval  IE                 [facility] Captured Y                         Date 11/9 11/13 11/15     Manuals        Shoulder PROM  KS KS     GH/Lateral Distraction        Posterior [place]  KS Gr II KS Gr III     STM to R Biceps Tendon                        Neuro Re-Ed        SA Punches   5# DB 3/10ea     Push-up Plus nv 3/10ea 3/10ea     Bilateral ER nv np      Seated T-spine Ext        [place]: Row, Ext, HABD, 90/90 ER, Y nv 5# Row & Ext 3/10ea 5# Row & Ext 3/10ea             Ther Ex        Posterior Capsule Stretch        Sleeper Stretch        S/Lying ER & ABD nv 3# 3/10ea ER only 3# 3/10ea ER only     Standing TB ER/IR nv GTB 3/10 GTB 3/10     Standing TB Row & Ext BlTB 3/10 nv BlTB 3/10     UBE: UE Muscular Endurance   2'/2' Education Shoulder Anatomy, Mechanics               Ther Activity                        Modalities        CP                     Access Code: [email or web address]  URL: [email or web address]  Date: 11/15/2023  Prepared by: Del Salmon    Exercises  - Standing Shoulder Row with Anchored Resistance  - 1 x daily - 7 x weekly - 3 sets - 10 reps  - Shoulder extension with resistance - Neutral  - 1 x daily - 3 sets - 10 reps  - Sidelying Shoulder External Rotation  - 1 x daily - 7 x weekly - 3 sets - 10 reps  - Shoulder External Rotation with Anchored Resistance  - 1 x daily - 7 x weekly - 3 sets - 10 reps  - Shoulder Internal Rotation with Resistance  - 1 x daily - 7 x weekly - 3 sets - 10 reps  - Prone Shoulder Row  - 1 x daily - 7 x weekly - 3 sets - 10 reps  - Prone Shoulder Extension - Single Arm  - 1 x daily - 7 x weekly - 3 sets - 10 repsAccess Code:

## 2023-11-16 ENCOUNTER — APPOINTMENT (OUTPATIENT)
Dept: PHYSICAL THERAPY | Facility: CLINIC | Age: 83
End: 2023-11-16
Payer: MEDICARE

## 2023-11-21 ENCOUNTER — OFFICE VISIT (OUTPATIENT)
Dept: PHYSICAL THERAPY | Facility: CLINIC | Age: 83
End: 2023-11-21
Payer: MEDICARE

## 2023-11-21 DIAGNOSIS — M77.8 TENDONITIS OF SHOULDER, LEFT: Primary | ICD-10-CM

## 2023-11-21 DIAGNOSIS — M25.512 ACUTE PAIN OF LEFT SHOULDER: ICD-10-CM

## 2023-11-21 PROCEDURE — 97110 THERAPEUTIC EXERCISES: CPT

## 2023-11-21 PROCEDURE — 97140 MANUAL THERAPY 1/> REGIONS: CPT

## 2023-11-21 PROCEDURE — 97112 NEUROMUSCULAR REEDUCATION: CPT

## 2023-11-21 NOTE — PROGRESS NOTES
Daily Note     Today's date: 2023  Patient name: Daryn Goyal  : 1940  MRN: [id number]  Referring provider: Fermin Brunson MD  Dx:   Encounter Diagnosis     ICD-10-CM    1. Tendonitis of shoulder, left  M77.8       2. Acute pain of left shoulder  M25.512           Start Time: 1425  Stop Time: 1515  Total time in clinic (min): 50 minutes    Subjective: Reports pain intensity and painful arc have lessened. Trialed bench press and some flys at the gym, but had increase in left shoulder pain. Gradually lessened since trial, but still a little elevated. Objective: See treatment diary below      Assessment: Tolerated treatment well. Patient demonstrated fatigue post treatment, exhibited good technique with therapeutic exercises, and would benefit from continued PT. Improved pain intensity following manual therapy mobilizations and STM to region. Provided education regarding tendon healing. Patient verbalized understanding of education provided. Plan: Continue per plan of care.       Precautions: None      Daily Treatment Diary:      Initial Evaluation Date: 23  POC Expiration: 23  Compliance 11/09/23  11/13  11/15  11/21               Visit Number 1 2  3  4               Re-Eval  [address] Captured Y                         Date 11/9 11/13 11/15 11/21    Manuals        Shoulder PROM  KS KS KS    GH/Lateral Distraction        Posterior [place]  KS Gr II KS Gr III KS Gr III    STM to R Biceps Tendon    KS Infra, Supra, Biceps LH                    Neuro Re-Ed        SA Punches   5# DB 3/10ea 5# DB 3/10ea    Push-up Plus nv 3/10ea 3/10ea 3/10ea    Bilateral ER nv np      Seated T-spine Ext        [place]: Row, Ext, HABD, 90/90 ER, Y nv 5# Row & Ext 3/10ea 5# Row & Ext 3/10ea 5# Row & Ext 2/15ea Inc DB           Ther Ex        Posterior Capsule Stretch        Sleeper Stretch    10"/10    S/Lying ER & ABD nv 3# 3/10ea ER only 3# 3/10ea ER only 4# 3/10ea ER only    Standing TB ER/IR nv GTB 3/10 GTB 3/10 nv    Standing TB Row & Ext BlTB 3/10 nv BlTB 3/10 nv    UBE: UE Muscular Endurance   2'/2' 2'/2' Lvl 1.6    Education Shoulder Anatomy, Mechanics   Shoulder Anatomy, Mechanics, Tendon Healing            Ther Activity                        Modalities        CP                     Access Code: [email or web address]  URL: [email or web address]  Date: 11/15/2023  Prepared by: Elias Hung    Exercises  - Standing Shoulder Row with Anchored Resistance  - 1 x daily - 7 x weekly - 3 sets - 10 reps  - Shoulder extension with resistance - Neutral  - 1 x daily - 3 sets - 10 reps  - Sidelying Shoulder External Rotation  - 1 x daily - 7 x weekly - 3 sets - 10 reps  - Shoulder External Rotation with Anchored Resistance  - 1 x daily - 7 x weekly - 3 sets - 10 reps  - Shoulder Internal Rotation with Resistance  - 1 x daily - 7 x weekly - 3 sets - 10 reps  - Prone Shoulder Row  - 1 x daily - 7 x weekly - 3 sets - 10 reps  - Prone Shoulder Extension - Single Arm  - 1 x daily - 7 x weekly - 3 sets - 10 repsAccess Code:

## 2023-11-22 ENCOUNTER — OFFICE VISIT (OUTPATIENT)
Dept: PHYSICAL THERAPY | Facility: CLINIC | Age: 83
End: 2023-11-22
Payer: MEDICARE

## 2023-11-22 DIAGNOSIS — M77.8 TENDONITIS OF SHOULDER, LEFT: Primary | ICD-10-CM

## 2023-11-22 DIAGNOSIS — M25.512 ACUTE PAIN OF LEFT SHOULDER: ICD-10-CM

## 2023-11-22 PROCEDURE — 97140 MANUAL THERAPY 1/> REGIONS: CPT

## 2023-11-22 PROCEDURE — 97112 NEUROMUSCULAR REEDUCATION: CPT

## 2023-11-22 PROCEDURE — 97110 THERAPEUTIC EXERCISES: CPT

## 2023-11-22 NOTE — PROGRESS NOTES
Daily Note     Today's date: 2023  Patient name: Haseeb Zacarias  : 1940  MRN: [id number]  Referring provider: Arpit Leslie MD  Dx:   Encounter Diagnosis     ICD-10-CM    1. Tendonitis of shoulder, left  M77.8       2. Acute pain of left shoulder  M25.512           Start Time: 0915  Stop Time: 1010  Total time in clinic (min): 55 minutes    Subjective: Reports he had some soreness after PT treatment yesterday, but otherwise is feeling good. Objective: See treatment diary below      Assessment: Tolerated treatment well. Patient demonstrated fatigue post treatment, exhibited good technique with therapeutic exercises, and would benefit from continued PT. Improved pain-free ROM c/ test-retest after session c/ both abduction and flexion. Gets some pinching from 60-75* abduction. Plan: Continue per plan of care.       Precautions: None      Daily Treatment Diary:      Initial Evaluation Date: 23  POC Expiration: 23  Compliance 11/09/23  11/13  11/15  11/21  11/22             Visit Number 1 2  3  4  5             Re-Eval  IE                 [address] [facility] Captured Y                         Date 11/9 11/13 11/15 11/21 11/22   Manuals        Shoulder PROM  KS KS KS KS   GH/Lateral Distraction        Posterior [place]  KS Gr II KS Gr III KS Gr III KS Gr III   STM to L Biceps Tendon    KS Infra, Supra, Biceps LH KS Infra, Supra, Biceps LH                   Neuro Re-Ed        SA Punches   5# DB 3/10ea 5# DB 3/10ea 5# DB 3/10ea   Push-up Plus nv 3/10ea 3/10ea 3/10ea 3/10ea   Bilateral ER nv np      Seated T-spine Ext        [place]: Row, Ext, HABD, 90/90 ER, Y nv 5# Row & Ext 3/10ea 5# Row & Ext 3/10ea 5# Row & Ext 2/15ea 10# Row & Ext 2/15ea           Ther Ex        Posterior Capsule Stretch        Sleeper Stretch    10"/10 10"/10   S/Lying ER & ABD nv 3# 3/10ea ER only 3# 3/10ea ER only 4# 3/10ea ER only 4#/0# 3/10ea   Standing TB ER/IR nv GTB 3/10 GTB 3/10 nv np   Standing TB Row & Ext BlTB 3/10 nv BlTB 3/10 nv np   UBE: UE Muscular Endurance   2'/2' 2'/2' Lvl 1.6 2'/2' lvl 1.7   Education Shoulder Anatomy, Mechanics   Shoulder Anatomy, Mechanics, Tendon Healing            Ther Activity                        Modalities        CP                     Access Code: [email or web address]  URL: [email or web address]  Date: 11/15/2023  Prepared by: Lily Crockett    Exercises  - Standing Shoulder Row with Anchored Resistance  - 1 x daily - 7 x weekly - 3 sets - 10 reps  - Shoulder extension with resistance - Neutral  - 1 x daily - 3 sets - 10 reps  - Sidelying Shoulder External Rotation  - 1 x daily - 7 x weekly - 3 sets - 10 reps  - Shoulder External Rotation with Anchored Resistance  - 1 x daily - 7 x weekly - 3 sets - 10 reps  - Shoulder Internal Rotation with Resistance  - 1 x daily - 7 x weekly - 3 sets - 10 reps  - Prone Shoulder Row  - 1 x daily - 7 x weekly - 3 sets - 10 reps  - Prone Shoulder Extension - Single Arm  - 1 x daily - 7 x weekly - 3 sets - 10 repsAccess Code:

## 2023-11-27 ENCOUNTER — DOCTOR'S OFFICE (OUTPATIENT)
Dept: URBAN - NONMETROPOLITAN AREA CLINIC 1 | Facility: CLINIC | Age: 83
Setting detail: OPHTHALMOLOGY
End: 2023-11-27
Payer: COMMERCIAL

## 2023-11-27 DIAGNOSIS — H40.1121: ICD-10-CM

## 2023-11-27 DIAGNOSIS — H35.81: ICD-10-CM

## 2023-11-27 DIAGNOSIS — H35.371: ICD-10-CM

## 2023-11-27 DIAGNOSIS — H40.1113: ICD-10-CM

## 2023-11-27 PROCEDURE — 92134 CPTRZ OPH DX IMG PST SGM RTA: CPT | Performed by: OPHTHALMOLOGY

## 2023-11-27 PROCEDURE — 99213 OFFICE O/P EST LOW 20 MIN: CPT | Performed by: OPHTHALMOLOGY

## 2023-11-27 ASSESSMENT — AXIALLENGTH_DERIVED
OD_AL: 23.1657
OS_AL: 23.0257
OD_AL: 23.1188
OS_AL: 22.8873

## 2023-11-27 ASSESSMENT — SPHEQUIV_DERIVED
OS_SPHEQUIV: -0.375
OS_SPHEQUIV: -0.75
OD_SPHEQUIV: -1.125
OD_SPHEQUIV: -1

## 2023-11-27 ASSESSMENT — REFRACTION_CURRENTRX
OS_CYLINDER: -1.50
OD_SPHERE: -0.50
OS_OVR_VA: 20/
OS_SPHERE: +0.50
OS_ADD: +2.50
OD_OVR_VA: 20/
OD_VPRISM_DIRECTION: PROGS
OD_CYLINDER: -0.25
OD_ADD: +2.50
OS_AXIS: 86
OS_VPRISM_DIRECTION: PROGS
OD_AXIS: 111

## 2023-11-27 ASSESSMENT — REFRACTION_MANIFEST
OS_ADD: +2.50
OD_SPHERE: -1.00
OD_AXIS: 115
OD_CYLINDER: -0.25
OS_CYLINDER: -1.25
OS_VA2: 20/20
OS_VA1: 20/20
OD_ADD: +2.50
OD_VA2: 20/30-2
OU_VA: 20/20
OS_SPHERE: +0.25
OD_VA1: 20/30-2
OS_AXIS: 090

## 2023-11-27 ASSESSMENT — KERATOMETRY
OD_K2POWER_DIOPTERS: 46.00
OS_AXISANGLE_DEGREES: 120
OS_K1POWER_DIOPTERS: 45.75
OD_AXISANGLE_DEGREES: 011
OD_K1POWER_DIOPTERS: 45.75
OS_K2POWER_DIOPTERS: 46.00

## 2023-11-27 ASSESSMENT — REFRACTION_AUTOREFRACTION
OD_SPHERE: -1.00
OS_CYLINDER: -1.00
OS_AXIS: 093
OD_CYLINDER: 0.00
OS_SPHERE: -0.25
OD_AXIS: 180

## 2023-11-27 ASSESSMENT — CONFRONTATIONAL VISUAL FIELD TEST (CVF)
OD_FINDINGS: FULL
OS_FINDINGS: FULL

## 2023-11-27 ASSESSMENT — LID EXAM ASSESSMENTS
OD_BLEPHARITIS: RLL RUL
OS_BLEPHARITIS: LLL LUL

## 2023-11-27 ASSESSMENT — VISUAL ACUITY
OS_BCVA: 20/30-2
OD_BCVA: 20/25

## 2023-11-28 ENCOUNTER — OFFICE VISIT (OUTPATIENT)
Dept: PHYSICAL THERAPY | Facility: CLINIC | Age: 83
End: 2023-11-28
Payer: MEDICARE

## 2023-11-28 DIAGNOSIS — M77.8 TENDONITIS OF SHOULDER, LEFT: Primary | ICD-10-CM

## 2023-11-28 DIAGNOSIS — M25.512 ACUTE PAIN OF LEFT SHOULDER: ICD-10-CM

## 2023-11-28 PROCEDURE — 97110 THERAPEUTIC EXERCISES: CPT

## 2023-11-28 PROCEDURE — 97112 NEUROMUSCULAR REEDUCATION: CPT

## 2023-11-28 PROCEDURE — 97140 MANUAL THERAPY 1/> REGIONS: CPT

## 2023-11-28 NOTE — PROGRESS NOTES
Daily Note     Today's date: 2023  Patient name: Chrys Meckel  : 1940  MRN: [id number]  Referring provider: Foster Yoon MD  Dx:   Encounter Diagnosis     ICD-10-CM    1. Tendonitis of shoulder, left  M77.8       2. Acute pain of left shoulder  M25.512           Start Time: 1300  Stop Time: 1350  Total time in clinic (min): 50 minutes    Subjective: Reports he still has pain when lifting his coffee cup up in the  morning, but has noticed improved in intensity of pain since beginning PT. Objective: See treatment diary below      Assessment: Tolerated treatment well. Patient demonstrated fatigue post treatment, exhibited good technique with therapeutic exercises, and would benefit from continued PT. Demonstrates improved pain-free ROM following manual therapy interventions and addition of pec stretch. Provided copy of updated HEP. Plan: Continue per plan of care.       Precautions: None      Daily Treatment Diary:      Initial Evaluation Date: 23  POC Expiration: 23  Compliance 11/09/23  11/13  11/15  11/21  11/22  11/28           Visit Number 1 2  3  4  5  6           Re-Eval  IE                 [facility]   Foto Captured Y                         Date 11/28 11/13 11/15 11/21 11/22   Manuals        Shoulder PROM KS KS KS KS KS   GH/Lateral Distraction        Posterior [place] KS Gr III KS Gr II KS Gr III KS Gr III KS Gr III   STM to L Biceps Tendon KS Infra, Supra, Biceps LH   KS Infra, Supra, Biceps LH KS Infra, Supra, Biceps LH                   Neuro Re-Ed        SA Punches 5# DB 3/10ea  5# DB 3/10ea 5# DB 3/10ea 5# DB 3/10ea   Push-up Plus nv 3/10ea 3/10ea 3/10ea 3/10ea   [place]: Row, Ext, HABD, 90/90 ER, Y 10# Row & Ext 3/10ea 5# Row & Ext 3/10ea 5# Row & Ext 3/10ea 5# Row & Ext 2/15ea 10# Row & Ext 2/15ea           Ther Ex        Pec Stretch  Doorway 90* 10"/10       Sleeper Stretch nv   10"/10 10"/10   S/Lying ER & ABD 4#/0# 3/10ea 3# 3/10ea ER only 3# 3/10ea ER only 4# 3/10ea ER only 4#/0# 3/10ea   Standing TB ER/IR  GTB 3/10 GTB 3/10 nv np   Standing TB Row & Ext  nv BlTB 3/10 nv np   Shoulder FLX & Scap Painfree ROM 1# 2/10ea       UBE: UE Muscular Endurance 2'/2' lvl 1.7  2'/2' 2'/2' Lvl 1.6 2'/2' lvl 1.7   Education    Shoulder Anatomy, Mechanics, Tendon Healing            Ther Activity                        Modalities        CP                     Access Code: [email or web address]  URL: [email or web address]  Date: 11/15/2023  Prepared by: Paola Shaper    Exercises  - Standing Shoulder Row with Anchored Resistance  - 1 x daily - 7 x weekly - 3 sets - 10 reps  - Shoulder extension with resistance - Neutral  - 1 x daily - 3 sets - 10 reps  - Sidelying Shoulder External Rotation  - 1 x daily - 7 x weekly - 3 sets - 10 reps  - Shoulder External Rotation with Anchored Resistance  - 1 x daily - 7 x weekly - 3 sets - 10 reps  - Shoulder Internal Rotation with Resistance  - 1 x daily - 7 x weekly - 3 sets - 10 reps  - Prone Shoulder Row  - 1 x daily - 7 x weekly - 3 sets - 10 reps  - Prone Shoulder Extension - Single Arm  - 1 x daily - 7 x weekly - 3 sets - 10 repsAccess Code:

## 2023-11-30 ENCOUNTER — OFFICE VISIT (OUTPATIENT)
Dept: PHYSICAL THERAPY | Facility: CLINIC | Age: 83
End: 2023-11-30
Payer: MEDICARE

## 2023-11-30 DIAGNOSIS — M25.512 ACUTE PAIN OF LEFT SHOULDER: ICD-10-CM

## 2023-11-30 DIAGNOSIS — M77.8 TENDONITIS OF SHOULDER, LEFT: Primary | ICD-10-CM

## 2023-11-30 PROCEDURE — 97110 THERAPEUTIC EXERCISES: CPT

## 2023-11-30 PROCEDURE — 97140 MANUAL THERAPY 1/> REGIONS: CPT

## 2023-11-30 PROCEDURE — 97112 NEUROMUSCULAR REEDUCATION: CPT

## 2023-11-30 NOTE — PROGRESS NOTES
Daily Note     Today's date: 2023  Patient name: Too Saba  : 1940  MRN: [id number]  Referring provider: Malcolm Palm MD  Dx:   Encounter Diagnosis     ICD-10-CM    1. Tendonitis of shoulder, left  M77.8       2. Acute pain of left shoulder  M25.512           Start Time: 0930  Stop Time: 1015  Total time in clinic (min): 45 minutes    Subjective: Reports he has more ROM that is pain-free now. Has been performing pec stretch and feels that releases his arm movement a lot following intervention. Objective: See treatment diary below      Assessment: Tolerated treatment well. Patient demonstrated fatigue post treatment, exhibited good technique with therapeutic exercises, and would benefit from continued PT. Demonstrates improved strength c/ side-lying external rotation, as less visible shaking and fatigue present at the end of the repetitions. Requires vc's and demonstration for sleeper stretch technique. Plan: Continue per plan of care.       Precautions: None      Daily Treatment Diary:      Initial Evaluation Date: 23  POC Expiration: 23  Compliance 11/09/23  11/13  11/15  11/21  11/22  11/28  11/30         Visit Number 1 2  3  4  5  6  7         Re-Eval  [address] Captured Y                         Date 11/28 11/30 11/15 11/21 11/22   Manuals        Shoulder PROM KS KS KS KS KS   GH/Lateral Distraction        Posterior [place] KS Gr III KS Gr II KS Gr III KS Gr III KS Gr III   STM to L Biceps Tendon KS Infra, Supra, Biceps LH KS Infra,Supra,Biceps, LH  KS Infra, Supra, Biceps LH KS Infra, Supra, Biceps LH                   Neuro Re-Ed        SA Punches 5# DB 3/10ea 5# DB 3/10ea 5# DB 3/10ea 5# DB 3/10ea 5# DB 3/10ea   Push-up Plus nv 3/10ea 3/10ea 3/10ea 3/10ea   [place]: Row, Ext, HABD, 90/90 ER, Y 10# Row & Ext 3/10ea 10# Row & Ext 3/10ea 5# Row & Ext 3/10ea 5# Row & Ext 2/15ea 10# Row & Ext 2/15ea           Ther Ex        Pec Stretch  Doorway 90* 10"/10 Doorway 90* 10"/10      Sleeper Stretch nv 10"/10  10"/10 10"/10   S/Lying ER & ABD 4#/0# 3/10ea 4#/0# 3/10ea 3# 3/10ea ER only 4# 3/10ea ER only 4#/0# 3/10ea   Standing TB ER/IR  HEP GTB 3/10 nv np   Standing TB Row & Ext  HEP BlTB 3/10 nv np   Shoulder FLX & Scap Painfree ROM 1# 2/10ea Painfree ROM 1# 2/10ea      UBE: UE Muscular Endurance 2'/2' lvl 1.7 2'/2' lvl 1.9 2'/2' 2'/2' Lvl 1.6 2'/2' lvl 1.7   Education    Shoulder Anatomy, Mechanics, Tendon Healing            Ther Activity                        Modalities        CP                     Access Code: [email or web address]  URL: [email or web address]  Date: 11/15/2023  Prepared by: Jatin Pérez    Exercises  - Standing Shoulder Row with Anchored Resistance  - 1 x daily - 7 x weekly - 3 sets - 10 reps  - Shoulder extension with resistance - Neutral  - 1 x daily - 3 sets - 10 reps  - Sidelying Shoulder External Rotation  - 1 x daily - 7 x weekly - 3 sets - 10 reps  - Shoulder External Rotation with Anchored Resistance  - 1 x daily - 7 x weekly - 3 sets - 10 reps  - Shoulder Internal Rotation with Resistance  - 1 x daily - 7 x weekly - 3 sets - 10 reps  - Prone Shoulder Row  - 1 x daily - 7 x weekly - 3 sets - 10 reps  - Prone Shoulder Extension - Single Arm  - 1 x daily - 7 x weekly - 3 sets - 10 repsAccess Code:

## 2023-12-05 ENCOUNTER — OFFICE VISIT (OUTPATIENT)
Dept: PHYSICAL THERAPY | Facility: CLINIC | Age: 83
End: 2023-12-05
Payer: MEDICARE

## 2023-12-05 DIAGNOSIS — M77.8 TENDONITIS OF SHOULDER, LEFT: Primary | ICD-10-CM

## 2023-12-05 DIAGNOSIS — M25.512 ACUTE PAIN OF LEFT SHOULDER: ICD-10-CM

## 2023-12-05 PROCEDURE — 97110 THERAPEUTIC EXERCISES: CPT

## 2023-12-05 PROCEDURE — 97112 NEUROMUSCULAR REEDUCATION: CPT

## 2023-12-05 PROCEDURE — 97140 MANUAL THERAPY 1/> REGIONS: CPT

## 2023-12-05 NOTE — PROGRESS NOTES
Daily Note     Today's date: 2023  Patient name: Marco Bowser  : 1940  MRN: [id number]  Referring provider: Eloise tEienne MD  Dx:   Encounter Diagnosis     ICD-10-CM    1. Tendonitis of shoulder, left  M77.8       2. Acute pain of left shoulder  M25.512           Start Time: 0930  Stop Time: 1015  Total time in clinic (min): 45 minutes    Subjective: Reports he aggravated his left shoulder pain reaching out at the drive through at the [place]. Pain has lessened since then, but still elevated compared to level at last visit. Objective: See treatment diary below      Assessment: Tolerated treatment well. Patient demonstrated fatigue post treatment, exhibited good technique with therapeutic exercises, and would benefit from continued PT. Responded well to manual therapy interventions c/ improved shoulder ROM and less pain t/o elevation ROM. Plan: Continue per plan of care.       Precautions: None      Daily Treatment Diary:      Initial Evaluation Date: 23  POC Expiration: 23  Compliance 11/09/23  11/13  11/15  11/21  11/22  11/28  11/30  12/5       Visit Number 1 2  3  4  5  6  7  8       Re-Eval  IE                 [facility]   Foto Captured Y                         Date    Manuals        Shoulder PROM KS KS KS KS KS   GH/Lateral Distraction        Posterior [place] KS Gr III KS Gr II KS Gr III KS Gr III KS Gr III   STM to L Biceps Tendon KS Infra, Supra, Biceps LH KS Infra,Supra,Biceps, LH KS Infra,Supra,Biceps, LH KS Infra, Supra, Biceps LH KS Infra, Supra, Biceps LH                   Neuro Re-Ed        SA Punches 5# DB 3/10ea 5# DB 3/10ea 5# DB 3/10ea 5# DB 3/10ea 5# DB 3/10ea   Push-up Plus nv 3/10ea 3/10ea 3/10ea 3/10ea   Blackburns: Row, Ext, HABD, 90/90 ER, Y 10# Row & Ext 3/10ea 10# Row & Ext 3/10ea 10# Row & Ext 3/10ea 5# Row & Ext 2/15ea 10# Row & Ext 2/15ea           Ther Ex        Pec Stretch  Doorway 90* 10"/10 Doorway 90* 10"/10 Doorway 90* 10"/10 Sleeper Stretch nv 10"/10 10"/10 10"/10 10"/10   S/Lying ER & ABD 4#/0# 3/10ea 4#/0# 3/10ea 4#/0# 3/10ea 4# 3/10ea ER only 4#/0# 3/10ea   Standing TB ER/IR  HEP  nv np   Standing TB Row & Ext  HEP  nv np   Shoulder FLX & Scap Painfree ROM 1# 2/10ea Painfree ROM 1# 2/10ea Painfree ROM 1# 2/10ea     UBE: UE Muscular Endurance 2'/2' lvl 1.7 2'/2' lvl 1.9 2'/2' Lvl 1.7 2'/2' Lvl 1.6 2'/2' lvl 1.7   Education    Shoulder Anatomy, Mechanics, Tendon Healing            Ther Activity                        Modalities        CP                     Access Code: [address] [facility]  URL: [email or web address]  Date: 11/15/2023  Prepared by: Del Salmon    Exercises  - Standing Shoulder Row with Anchored Resistance  - 1 x daily - 7 x weekly - 3 sets - 10 reps  - Shoulder extension with resistance - Neutral  - 1 x daily - 3 sets - 10 reps  - Sidelying Shoulder External Rotation  - 1 x daily - 7 x weekly - 3 sets - 10 reps  - Shoulder External Rotation with Anchored Resistance  - 1 x daily - 7 x weekly - 3 sets - 10 reps  - Shoulder Internal Rotation with Resistance  - 1 x daily - 7 x weekly - 3 sets - 10 reps  - Prone Shoulder Row  - 1 x daily - 7 x weekly - 3 sets - 10 reps  - Prone Shoulder Extension - Single Arm  - 1 x daily - 7 x weekly - 3 sets - 10 repsAccess Code:

## 2023-12-07 ENCOUNTER — EVALUATION (OUTPATIENT)
Dept: PHYSICAL THERAPY | Facility: CLINIC | Age: 83
End: 2023-12-07
Payer: MEDICARE

## 2023-12-07 DIAGNOSIS — M25.512 ACUTE PAIN OF LEFT SHOULDER: ICD-10-CM

## 2023-12-07 DIAGNOSIS — M77.8 TENDONITIS OF SHOULDER, LEFT: Primary | ICD-10-CM

## 2023-12-07 PROCEDURE — 97112 NEUROMUSCULAR REEDUCATION: CPT

## 2023-12-07 PROCEDURE — 97110 THERAPEUTIC EXERCISES: CPT

## 2023-12-07 PROCEDURE — 97140 MANUAL THERAPY 1/> REGIONS: CPT

## 2023-12-07 NOTE — PROGRESS NOTES
PT Re-Evaluation     Today's date: 2023  Patient name: Conner Ramos  : 1940  MRN: [id number]  Referring provider: Ginger Snell MD  Dx:   Encounter Diagnosis     ICD-10-CM    1. Tendonitis of shoulder, left  M77.8       2. Acute pain of left shoulder  M25.512           Start Time: 0930  Stop Time: 1015  Total time in clinic (min): 45 minutes    Assessment  Assessment details: Patient is a 81 y.o. male with medical diagnosis of left shoulder tendinitis and pain. Patient continues to demonstrate progress in pain and function as evidenced by improved shoulder AROM, increased scapular stability and increased L shoulder strength. Continues to have pain in mid range of AROM shoulder elevation c/ functional lifting and carrying, though intensity has lessened. Functionally also unable to go fishing due to pain in L shoulder (Patient is LHD). Had exacerbation over the weekend c/ significant increase in shoulder pain, but is back to above baseline prior to IE. Patient's LTGs are progressing, but not fully met at this time. Patient will continue to benefit from skilled PT interventions to address remaining impairments and functional limitations. Impairments: abnormal or restricted ROM, activity intolerance, impaired physical strength, lacks appropriate home exercise program, pain with function, scapular dyskinesis, poor posture  and poor body mechanics    Goals  STG (3 weeks): L Shoulder ROM WNL. Partially met. Improve L [address] [place] ER Strength by 1/2 grade. Partially met. LTG (6 weeks): Progressing. L Shoulder >=4+/5 Strength. L Scap Strength >=4+/5.   80% improvement in left shoulder pain. Patient will be independent with HEP in 6 weeks to allow independent management of condition. Plan  Plan details: TE, NMR, TA, MT, self-care, and modalities as needed in order to progress through skilled PT focused on ROM, strength, posture, motor control.     Patient would benefit from: skilled physical therapy  Planned modality interventions: cryotherapy and thermotherapy: hydrocollator packs  Planned therapy interventions: manual therapy, neuromuscular re-education, patient education, self care, therapeutic activities, therapeutic exercise and home exercise program  Frequency: 2x week  Duration in weeks: 6  Plan of Care beginning date: 2023  Plan of Care expiration date: 2024  Treatment plan discussed with: patient        Subjective Evaluation    History of Present Illness  Mechanism of injury: IE: Patient is a 81 y.o. male presenting with left shoulder pain. Symptoms began 3-4 weeks ago, was fly fishing and it started to bother him. Noticed it also was hurting c/ lifting up to 90*. Has been resting it and feels it has improved, but is fearful of returning to fly fishing and aggravating it again. Was going to the gym several times a week, but has rested from that as well. TENS and ice at home help symptoms slightly. Patients primary goals for PT are to be able to decrease pain and return fly fishing and the gym. UPDATE RE : Patient reports 30-40% improvement since beginning PT. L shoulder has more pain-free ROM. Able to get quart of milk out of refrigerator and bring his coffee to hip mouth without shoulder pain. Greater strength c/ biceps curls motions. Able to carry things. Still has pain c/ shoulder abduction c/ elbow flexed.    Patient Goals  Patient goals for therapy: increased strength, decreased pain, return to sport/leisure activities and increased motion  Patient goal: return to fly fishing and gym  Pain  Current pain rating: 3  At best pain ratin  At worst pain rating: 3  Quality: dull ache and sharp  Relieving factors: ice  Aggravating factors: overhead activity and lifting  Progression: improved    Hand dominance: left          Objective    Flowsheet Rows      Flowsheet Row Most Recent Value   PT/OT G-Codes    Current Score 78   Projected Score 72        Observation:     -Scapular Stability: Stab, HABD, Scap   RE (12/7)   -L: 4+/5, 4/5, 4/5  IE   -R: 5/5, 4+/5, 4+/5   -L: 4+/5, 4/5, 4-/5     TTP: just inferior to bicipital groove, anterior to deltoid insertion     Shoulder ROM (degrees):               IE (R/L) RE (12/7, L)  FLX:   145/140  145  ABD:   150/136 150  ER:     65/61  61  IR:      T7/T7  T7     Shoulder Strength (MMT Grades):               IE (R/L) R (12/7, L)  FLX:     4+/4  4+  ABD:    4+/4-  4  ER:      4+/3+  4-  IR:        5/4+  4+     Shoulder Special Tests (L):  Kandy's Bubba: (-)  Empty Can: (-)  Dominga: (-)        Precautions: None      Daily Treatment Diary:      Initial Evaluation Date: 11/09/23  Re-evaluation: 12/7/23  POC Expiration: 1/18/24  Compliance 11/09/23  11/13  11/15  11/21  11/22  11/28  11/30  12/5  12/7     Visit Number 1 2  3  4  5  6  7  8  9     Re-Eval  IE                 [facility]   Foto Captured Y                         Date 11/28 11/30 12/5 12/7 11/22   Manuals        Shoulder PROM KS KS KS KS KS   GH/Lateral Distraction        Posterior [place] KS Gr III KS Gr II KS Gr III KS Gr III KS Gr III   STM to L Biceps Tendon KS Infra, Supra, Biceps LH KS Infra,Supra,Biceps, LH KS Infra,Supra,Biceps, LH KS Infra, Supra, Biceps LH KS Infra, Supra, Biceps LH       Assessment            Neuro Re-Ed        SA Punches 5# DB 3/10ea 5# DB 3/10ea 5# DB 3/10ea nv 5# DB 3/10ea   Push-up Plus nv 3/10ea 3/10ea 3/10ea 3/10ea   Blackburns: Row, Ext, HABD, 90/90 ER, Y 10# Row & Ext 3/10ea 10# Row & Ext 3/10ea 10# Row & Ext 3/10ea 10# Row & Ext 2/15ea 10# Row & Ext 2/15ea           Ther Ex        Pec Stretch  Doorway 90* 10"/10 Doorway 90* 10"/10 Doorway 90* 10"/10 nv    Sleeper Stretch nv 10"/10 10"/10 nv 10"/10   S/Lying ER & ABD 4#/0# 3/10ea 4#/0# 3/10ea 4#/0# 3/10ea 4# 3/10ea ER only 4#/0# 3/10ea   Standing TB ER/IR  HEP   np   Standing TB Row & Ext  HEP   np   Shoulder FLX & Scap Painfree ROM 1# 2/10ea Painfree ROM 1# 2/10ea Painfree ROM 1# 2/10ea Pain free ROM 1# 2/10ea    UBE: UE Muscular Endurance 2'/2' lvl 1.7 2'/2' lvl 1.9 2'/2' Lvl 1.7 2'/2' Lvl 1.6 2'/2' lvl 1.7   Education                Ther Activity                        Modalities        CP                     Access Code: [facility]  URL: [email or web address]  Date: 11/09/2023  Prepared by: Stephen Gandhi    Exercises  - Standing Shoulder Row with Anchored Resistance  - 1 x daily - 7 x weekly - 3 sets - 10 reps  - Shoulder extension with resistance - Neutral  - 1 x daily - 3 sets - 10 reps

## 2023-12-07 NOTE — LETTER
2023    Arpit Leslie MD  [address] [place] [address],[address] [id number]    Patient: Haseeb Zacarias   YOB: 1940   Date of Visit: 2023     Encounter Diagnosis     ICD-10-CM    1. Tendonitis of shoulder, left  M77.8       2. Acute pain of left shoulder  M25.512           Dear Dr. Shaista Christianson: Thank you for your recent referral of Haseeb Zacarias. Please review the attached evaluation summary from Amish's recent visit. Please verify that you agree with the plan of care by signing the attached order. If you have any questions or concerns, please do not hesitate to call. I sincerely appreciate the opportunity to share in the care of one of your patients and hope to have another opportunity to work with you in the near future. Sincerely,    Rai Whitaker, PT      Referring Provider:      I certify that I have read the below Plan of Care and certify the need for these services furnished under this plan of treatment while under my care. Arpit Leslie MD  [address] [place] [address],[address] [address]  Via Fax: [phone]          PT Re-Evaluation     Today's date: 2023  Patient name: Haseeb Zacarias  : 1940  MRN: [id number]  Referring provider: Arpit Leslie MD  Dx:   Encounter Diagnosis     ICD-10-CM    1. Tendonitis of shoulder, left  M77.8       2. Acute pain of left shoulder  M25.512           Start Time: 0930  Stop Time: 1015  Total time in clinic (min): 45 minutes    Assessment  Assessment details: Patient is a 81 y.o. male with medical diagnosis of left shoulder tendinitis and pain. Patient continues to demonstrate progress in pain and function as evidenced by improved shoulder AROM, increased scapular stability and increased L shoulder strength. Continues to have pain in mid range of AROM shoulder elevation c/ functional lifting and carrying, though intensity has lessened.  Functionally also unable to go fishing due to pain in L shoulder (Patient is LHD). Had exacerbation over the weekend c/ significant increase in shoulder pain, but is back to above baseline prior to IE. Patient's LTGs are progressing, but not fully met at this time. Patient will continue to benefit from skilled PT interventions to address remaining impairments and functional limitations. Impairments: abnormal or restricted ROM, activity intolerance, impaired physical strength, lacks appropriate home exercise program, pain with function, scapular dyskinesis, poor posture  and poor body mechanics    Goals  STG (3 weeks): L Shoulder ROM WNL. Partially met. Improve L [address] [place] ER Strength by 1/2 grade. Partially met. LTG (6 weeks): Progressing. L Shoulder >=4+/5 Strength. L Scap Strength >=4+/5.   80% improvement in left shoulder pain. Patient will be independent with HEP in 6 weeks to allow independent management of condition. Plan  Plan details: TE, NMR, TA, MT, self-care, and modalities as needed in order to progress through skilled PT focused on ROM, strength, posture, motor control. Patient would benefit from: skilled physical therapy  Planned modality interventions: cryotherapy and thermotherapy: hydrocollator packs  Planned therapy interventions: manual therapy, neuromuscular re-education, patient education, self care, therapeutic activities, therapeutic exercise and home exercise program  Frequency: 2x week  Duration in weeks: 6  Plan of Care beginning date: 12/7/2023  Plan of Care expiration date: 1/18/2024  Treatment plan discussed with: patient        Subjective Evaluation    History of Present Illness  Mechanism of injury: IE: Patient is a 81 y.o. male presenting with left shoulder pain. Symptoms began 3-4 weeks ago, was fly fishing and it started to bother him. Noticed it also was hurting c/ lifting up to 90*. Has been resting it and feels it has improved, but is fearful of returning to fly fishing and aggravating it again.   Was going to the gym several times a week, but has rested from that as well. TENS and ice at home help symptoms slightly. Patients primary goals for PT are to be able to decrease pain and return fly fishing and the gym. UPDATE RE : Patient reports 30-40% improvement since beginning PT. L shoulder has more pain-free ROM. Able to get quart of milk out of refrigerator and bring his coffee to hip mouth without shoulder pain. Greater strength c/ biceps curls motions. Able to carry things. Still has pain c/ shoulder abduction c/ elbow flexed.    Patient Goals  Patient goals for therapy: increased strength, decreased pain, return to sport/leisure activities and increased motion  Patient goal: return to fly fishing and gym  Pain  Current pain rating: 3  At best pain ratin  At worst pain rating: 3  Quality: dull ache and sharp  Relieving factors: ice  Aggravating factors: overhead activity and lifting  Progression: improved    Hand dominance: left          Objective    Flowsheet Rows      Flowsheet Row Most Recent Value   PT/OT G-Codes    Current Score 78   Projected Score 72        Observation:     -Scapular Stability: Stab, HABD, Scap   RE ()   -L: 4+/5, 4/5, 4/5  IE   -R: 5/5, 4+/5, 4+/5   -L: 4+/5, 4/5, 4-/5     TTP: just inferior to bicipital groove, anterior to deltoid insertion     Shoulder ROM (degrees):               IE (R/L) RE (, L)  FLX:   145/140  145  ABD:   150/136 150  ER:     65/61  61  IR:      T7/T7  T7     Shoulder Strength (MMT Grades):               IE (R/L) R (, L)  FLX:     4+/4  4+  ABD:    4+/4-  4  ER:      4+/3+  4-  IR:        5/4+  4+     Shoulder Special Tests (L):  Kandy's Bubba: (-)  Empty Can: (-)  Dominga: (-)        Precautions: None      Daily Treatment Diary:      Initial Evaluation Date: 23  Re-evaluation: 23  POC Expiration: 24  Compliance 11/09/23  11/13  11/15  11/21  11/22  11/28  11/30  12/5  12/7     Visit Number 1 2  3  4  5  6  7  8  9     Re-Eval  IE [facility]   Foto Captured Y                         Date 11/28 11/30 12/5 12/7 11/22   Manuals        Shoulder PROM KS KS KS KS KS   GH/Lateral Distraction        Posterior [place] KS Gr III KS Gr II KS Gr III KS Gr III KS Gr III   STM to L Biceps Tendon KS Infra, Supra, Biceps LH KS Infra,Supra,Biceps, LH KS Infra,Supra,Biceps, LH KS Infra, Supra, Biceps LH KS Infra, Supra, Biceps LH       Assessment            Neuro Re-Ed        SA Punches 5# DB 3/10ea 5# DB 3/10ea 5# DB 3/10ea nv 5# DB 3/10ea   Push-up Plus nv 3/10ea 3/10ea 3/10ea 3/10ea   Blackburns: Row, Ext, HABD, 90/90 ER, Y 10# Row & Ext 3/10ea 10# Row & Ext 3/10ea 10# Row & Ext 3/10ea 10# Row & Ext 2/15ea 10# Row & Ext 2/15ea           Ther Ex        Pec Stretch  Doorway 90* 10"/10 Doorway 90* 10"/10 Doorway 90* 10"/10 nv    Sleeper Stretch nv 10"/10 10"/10 nv 10"/10   S/Lying ER & ABD 4#/0# 3/10ea 4#/0# 3/10ea 4#/0# 3/10ea 4# 3/10ea ER only 4#/0# 3/10ea   Standing TB ER/IR  HEP   np   Standing TB Row & Ext  HEP   np   Shoulder FLX & Scap Painfree ROM 1# 2/10ea Painfree ROM 1# 2/10ea Painfree ROM 1# 2/10ea Pain free ROM 1# 2/10ea    UBE: UE Muscular Endurance 2'/2' lvl 1.7 2'/2' lvl 1.9 2'/2' Lvl 1.7 2'/2' Lvl 1.6 2'/2' lvl 1.7   Education                Ther Activity                        Modalities        CP                     Access Code: [address] [place] [address]  URL: [email or web address]  Date: 11/09/2023  Prepared by: Linus Araiza    Exercises  - Standing Shoulder Row with Anchored Resistance  - 1 x daily - 7 x weekly - 3 sets - 10 reps  - Shoulder extension with resistance - Neutral  - 1 x daily - 3 sets - 10 reps

## 2023-12-08 DIAGNOSIS — F41.1 GAD (GENERALIZED ANXIETY DISORDER): ICD-10-CM

## 2023-12-08 RX ORDER — DULOXETIN HYDROCHLORIDE 60 MG/1
120 CAPSULE, DELAYED RELEASE ORAL DAILY
Qty: 180 CAPSULE | Refills: 1 | Status: SHIPPED | OUTPATIENT
Start: 2023-12-08

## 2023-12-12 ENCOUNTER — OFFICE VISIT (OUTPATIENT)
Dept: PHYSICAL THERAPY | Facility: CLINIC | Age: 83
End: 2023-12-12
Payer: MEDICARE

## 2023-12-12 DIAGNOSIS — M25.512 ACUTE PAIN OF LEFT SHOULDER: ICD-10-CM

## 2023-12-12 DIAGNOSIS — M77.8 TENDONITIS OF SHOULDER, LEFT: Primary | ICD-10-CM

## 2023-12-12 PROCEDURE — 97112 NEUROMUSCULAR REEDUCATION: CPT

## 2023-12-12 PROCEDURE — 97140 MANUAL THERAPY 1/> REGIONS: CPT

## 2023-12-12 PROCEDURE — 97110 THERAPEUTIC EXERCISES: CPT

## 2023-12-12 NOTE — PROGRESS NOTES
Daily Note     Today's date: 2023  Patient name: Seymour Killian  : 1940  MRN: [id number]  Referring provider: Cruz Dancer, MD  Dx:   Encounter Diagnosis     ICD-10-CM    1. Tendonitis of shoulder, left  M77.8       2. Acute pain of left shoulder  M25.512           Start Time: 924  Stop Time: 1015  Total time in clinic (min): 51 minutes    Subjective: Reports his L shoulder was feeling "outstanding" until he had to load his bike into his vehicle on Saturday. Pain increased to 4-5/10 and persisted c/ movements, pain intensity has improved since exacerbation but still persists greater than pre-exacerbation. Objective: See treatment diary below      Assessment: Tolerated treatment well. Patient demonstrated fatigue post treatment, exhibited good technique with therapeutic exercises, and would benefit from continued PT. Pain-free L shoulder elevation in flexion, scaption and abduction following manual therapy interventions performed this visit. Plan: Continue per plan of care.       Precautions: None      Daily Treatment Diary:      Initial Evaluation Date: 23  Re-evaluation: 23  POC Expiration: 24  Compliance 11/09/23  11/13  11/15  11/21  11/22  11/28  11/30  12/5  12/7 12/12   Visit Number 1 2  3  4  5  6  7  8  9 10   Re-Eval  IE               RE    Foto Captured Y                         Date    Manuals        Shoulder PROM KS KS KS KS KS   GH/Lateral Distraction        Posterior [place] KS Gr III KS Gr II KS Gr III KS Gr III KS Gr III   STM to L Biceps Tendon KS Infra, Supra, Biceps LH KS Infra,Supra,Biceps, LH KS Infra,Supra,Biceps, LH KS Infra, Supra, Biceps LH KS Infra, Supra, Biceps LH, subscap       Assessment            Neuro Re-Ed        SA Punches 5# DB 3/10ea 5# DB 3/10ea 5# DB 3/10ea nv np   Push-up Plus nv 3/10ea 3/10ea 3/10ea nv   Blackburns: Row, Ext, HABD, 90/90 ER, Y 10# Row & Ext 3/10ea 10# Row & Ext 3/10ea 10# Row & Ext 3/10ea 10# IAB & Ext 2/15ea 10# Row & Ext 2/15ea           Ther Ex        Pec Stretch  Doorway 90* 10"/10 Doorway 90* 10"/10 Doorway 90* 10"/10 nv nv   Sleeper Stretch nv 10"/10 10"/10 nv 10"/10   S/Lying ER & ABD 4#/0# 3/10ea 4#/0# 3/10ea 4#/0# 3/10ea 4# 3/10ea ER only 4#/0# 3/10ea   Standing TB ER/IR  HEP      Standing TB Row & Ext  HEP      Shoulder FLX & Scap Painfree ROM 1# 2/10ea Painfree ROM 1# 2/10ea Painfree ROM 1# 2/10ea Pain free ROM 1# 2/10ea Pain free ROM 1# 2/10ea +ABD   UBE: UE Muscular Endurance 2'/2' lvl 1.7 2'/2' lvl 1.9 2'/2' Lvl 1.7 2'/2' Lvl 1.6 2'/2' lvl 1.8   Education                Ther Activity                        Modalities        CP                     Access Code: [address] [place] [address]  URL: [email or web address]  Date: 11/09/2023  Prepared by: Sakshi No    Exercises  - Standing Shoulder Row with Anchored Resistance  - 1 x daily - 7 x weekly - 3 sets - 10 reps  - Shoulder extension with resistance - Neutral  - 1 x daily - 3 sets - 10 reps

## 2023-12-14 ENCOUNTER — OFFICE VISIT (OUTPATIENT)
Dept: PHYSICAL THERAPY | Facility: CLINIC | Age: 83
End: 2023-12-14
Payer: MEDICARE

## 2023-12-14 DIAGNOSIS — M77.8 TENDONITIS OF SHOULDER, LEFT: Primary | ICD-10-CM

## 2023-12-14 DIAGNOSIS — M25.512 ACUTE PAIN OF LEFT SHOULDER: ICD-10-CM

## 2023-12-14 PROCEDURE — 97110 THERAPEUTIC EXERCISES: CPT

## 2023-12-14 PROCEDURE — 97140 MANUAL THERAPY 1/> REGIONS: CPT

## 2023-12-14 PROCEDURE — 97112 NEUROMUSCULAR REEDUCATION: CPT

## 2023-12-14 NOTE — PROGRESS NOTES
Daily Note     Today's date: 2023  Patient name: Jacob Fernandez  : 1940  MRN: [id number]  Referring provider: Adalgisa Galicia MD  Dx:   Encounter Diagnosis     ICD-10-CM    1. Tendonitis of shoulder, left  M77.8       2. Acute pain of left shoulder  M25.512           Start Time: 0845  Stop Time: 09  Total time in clinic (min): 45 minutes    Subjective: Reports holding his cell phone to his ear with his left arm sometimes bothers him. Objective: See treatment diary below      Assessment: Tolerated treatment well. Patient demonstrated fatigue post treatment, exhibited good technique with therapeutic exercises, and would benefit from continued PT. 1/10 pain in mid range c/ elevation of left shoulder following manual therapy interventions. Plan: Continue per plan of care.       Precautions: None      Daily Treatment Diary:      Initial Evaluation Date: 23  Re-evaluation: 23  POC Expiration: 24  Compliance 12/14  11/13  11/15  11/21  11/22  11/28  11/30  12/5  12/7 12/12   Visit Number 11 2  3  4  5  6  7  8  9 10   Re-Eval                 RE    Foto Captured Y                         Date    Manuals        Shoulder PROM KS KS KS KS KS   GH/Lateral Distraction        Posterior [place] KS Gr III KS Gr II KS Gr III KS Gr III KS Gr III   STM to L Biceps Tendon KS Infra, Supra, Biceps LH KS Infra,Supra,Biceps, LH KS Infra,Supra,Biceps, LH KS Infra, Supra, Biceps LH KS Infra, Supra, Biceps LH, subscap       Assessment            Neuro Re-Ed        SA Punches  5# DB 3/10ea 5# DB 3/10ea nv np   Push-up Plus 3/10ea 3/10ea 3/10ea 3/10ea nv   Blackburns: Row, Ext, HABD, 90/90 ER, Y 10# Row & Ext 3/10ea 10# Row & Ext 3/10ea 10# Row & Ext 3/10ea 10# Row & Ext 2/15ea 10# Row & Ext 2/15ea           Ther Ex        Pec Stretch  nv Doorway 90* 10"/10 Doorway 90* 10"/10 nv nv   Sleeper Stretch 10"/10 10"/10 10"/10 nv 10"/10   S/Lying ER & ABD 4#/0# 3/10ea 4#/0# 3/10ea 4#/0# 3/10ea 4# 3/10ea ER only 4#/0# 3/10ea   Standing TB ER/IR  HEP      Standing TB Row & Ext  HEP      Shoulder FLX & Scap Painfree ROM 1# 3/10ea Painfree ROM 1# 2/10ea Painfree ROM 1# 2/10ea Pain free ROM 1# 2/10ea Pain free ROM 1# 2/10ea +ABD   UBE: UE Muscular Endurance 2'/2' lvl 1.8 2'/2' lvl 1.9 2'/2' Lvl 1.7 2'/2' Lvl 1.6 2'/2' lvl 1.8   Education                Ther Activity                        Modalities        CP                     Access Code: [address] [place] [address]  URL: [email or web address]  Date: 11/09/2023  Prepared by: Lily Crockett    Exercises  - Standing Shoulder Row with Anchored Resistance  - 1 x daily - 7 x weekly - 3 sets - 10 reps  - Shoulder extension with resistance - Neutral  - 1 x daily - 3 sets - 10 reps

## 2023-12-19 ENCOUNTER — OFFICE VISIT (OUTPATIENT)
Dept: PHYSICAL THERAPY | Facility: CLINIC | Age: 83
End: 2023-12-19
Payer: MEDICARE

## 2023-12-19 DIAGNOSIS — M77.8 TENDONITIS OF SHOULDER, LEFT: Primary | ICD-10-CM

## 2023-12-19 DIAGNOSIS — M25.512 ACUTE PAIN OF LEFT SHOULDER: ICD-10-CM

## 2023-12-19 PROCEDURE — 97112 NEUROMUSCULAR REEDUCATION: CPT

## 2023-12-19 PROCEDURE — 97110 THERAPEUTIC EXERCISES: CPT

## 2023-12-19 PROCEDURE — 97140 MANUAL THERAPY 1/> REGIONS: CPT

## 2023-12-19 NOTE — PROGRESS NOTES
"Daily Note     Today's date: 2023  Patient name: Amish Pulido  : 1940  MRN: [id number]  Referring provider: Daniel Malone MD  Dx:   Encounter Diagnosis     ICD-10-CM    1. Tendonitis of shoulder, left  M77.8       2. Acute pain of left shoulder  M25.512           Start Time: 0930  Stop Time: 1015  Total time in clinic (min): 45 minutes    Subjective: Reports he is feeling a lot better, has limitation in reaching behind his back.       Objective: See treatment diary below      Assessment: Tolerated treatment well. Patient demonstrated fatigue post treatment, exhibited good technique with therapeutic exercises, and would benefit from continued PT. Continues to improve in L shoulder AROM following manual therapy interventions. Strength improving, though still has 1-2/10 pain 90* c/ flexion and hand in neutral, pain eliminated c/ shoulder ER.       Plan: Continue per plan of care.      Precautions: None      Daily Treatment Diary:      Initial Evaluation Date: 23  Re-evaluation: 23  POC Expiration: 24  Compliance 12/14 12/19  11/15  11/21  11/22  11/28  11/30  12/5  12/7 12/12   Visit Number 11 12  3  4  5  6  7  8  9 10   Re-Eval                 RE    Foto Captured Y                         Date    Manuals        Shoulder PROM KS KS KS KS KS   GH/Lateral Distraction        Posterior [place] KS Gr III KS Gr II KS Gr III KS Gr III KS Gr III   STM to L Biceps Tendon KS Infra, Supra, Biceps LH KS Infra,Supra,Biceps, LH KS Infra,Supra,Biceps, LH KS Infra, Supra, Biceps LH KS Infra, Supra, Biceps LH, subscap       Assessment            Neuro Re-Ed        SA Punches  HEP 5# DB 3/10ea nv np   Push-up Plus 3/10ea 3/10ea 3/10ea 3/10ea nv   Blackburns: Row, Ext, HABD, 90/90 ER, Y 10# Row & Ext 3/10ea 10# Row & Ext 3/10ea 10# Row & Ext 3/10ea 10# Row & Ext 2/15ea 10# Row & Ext 2/15ea           Ther Ex        Pec Stretch  nv  Doorway 90* 10\"/10 nv nv   Sleeper Stretch 10\"/10 " "10\"/10 10\"/10 nv 10\"/10   S/Lying ER & ABD 4#/0# 3/10ea 4#/0# 3/10ea 4#/0# 3/10ea 4# 3/10ea ER only 4#/0# 3/10ea   Standing TB ER/IR  HEP      Standing TB Row & Ext  HEP      Shoulder FLX & Scap Painfree ROM 1# 3/10ea Painfree ROM 1# 2/10ea Painfree ROM 1# 2/10ea Pain free ROM 1# 2/10ea Pain free ROM 1# 2/10ea +ABD   UBE: UE Muscular Endurance 2'/2' lvl 1.8 2'/2' lvl 1.9 2'/2' Lvl 1.7 2'/2' Lvl 1.6 2'/2' lvl 1.8   Education                Ther Activity                        Modalities        CP                     Access Code: [email or web address]  URL: [email or web address]  Date: 11/09/2023  Prepared by: Sonali Peña    Exercises  - Standing Shoulder Row with Anchored Resistance  - 1 x daily - 7 x weekly - 3 sets - 10 reps  - Shoulder extension with resistance - Neutral  - 1 x daily - 3 sets - 10 reps           "

## 2023-12-21 ENCOUNTER — OFFICE VISIT (OUTPATIENT)
Dept: PHYSICAL THERAPY | Facility: CLINIC | Age: 83
End: 2023-12-21
Payer: MEDICARE

## 2023-12-21 DIAGNOSIS — M25.512 ACUTE PAIN OF LEFT SHOULDER: ICD-10-CM

## 2023-12-21 DIAGNOSIS — M77.8 TENDONITIS OF SHOULDER, LEFT: Primary | ICD-10-CM

## 2023-12-21 PROCEDURE — 97140 MANUAL THERAPY 1/> REGIONS: CPT

## 2023-12-21 PROCEDURE — 97112 NEUROMUSCULAR REEDUCATION: CPT

## 2023-12-21 PROCEDURE — 97110 THERAPEUTIC EXERCISES: CPT

## 2023-12-21 NOTE — PROGRESS NOTES
"Daily Note     Today's date: 2023  Patient name: Amish Pulido  : 1940  MRN: [id number]  Referring provider: Daniel Malone MD  Dx:   Encounter Diagnosis     ICD-10-CM    1. Tendonitis of shoulder, left  M77.8       2. Acute pain of left shoulder  M25.512                      Subjective: Patient reports very happy with progress. Reports he gets minimal discomfort with a few minor movements.       Objective: See treatment diary below      Assessment: Amish Pulido tolerated treatment well. Shoulder ROM has greatly improved. Strength is steadily improving. Continued treatment indicated.       Plan: Continue per plan of care.      Precautions: None      Daily Treatment Diary:      Initial Evaluation Date: 23  Re-evaluation: 23  POC Expiration: 24  Compliance    Visit Number 11 12 13  4  5  6  7  8  9 10   Re-Eval                RE    Foto Captured Y                        Date    Manuals        Shoulder PROM KS KS SJ KS KS   GH/Lateral Distraction        Posterior [place] KS Gr III KS Gr II KS Gr II KS Gr III KS Gr III   STM to L Biceps Tendon KS Infra, Supra, Biceps LH KS Infra,Supra,Biceps, LH Infra,Supra,Biceps, LH KS Infra, Supra, Biceps LH KS Infra, Supra, Biceps LH, subscap       Assessment            Neuro Re-Ed        SA Punches  HEP - nv np   Push-up Plus 3/10ea 3/10ea 3x10ea 3/10ea nv   Blackburns: Row, Ext, HABD, 90/90 ER, Y 10# Row & Ext 3/10ea 10# Row & Ext 3/10ea 10# Row & Ext 3x10ea 10# Row & Ext 2/15ea 10# Row & Ext 2/15ea           Ther Ex        Pec Stretch  nv   nv nv   Sleeper Stretch 10\"/10 10\"/10 10\"x10 nv 10\"/10   S/Lying ER & ABD 4#/0# 3/10ea 4#/0# 3/10ea 4#/0# 3x10ea 4# 3/10ea ER only 4#/0# 3/10ea   Standing TB ER/IR  HEP -     Standing TB Row & Ext  HEP -     Shoulder FLX & Scap Painfree ROM 1# 3/10ea Painfree ROM 1# 2/10ea Painfree ROM 1# 2/10ea Pain free ROM 1# 2/10ea Pain free " ROM 1# 2/10ea +ABD   UBE: UE Muscular Endurance 2'/2' lvl 1.8 2'/2' lvl 1.9 2'/2' lvl 2.0 2'/2' Lvl 1.6 2'/2' lvl 1.8   Education                Ther Activity                        Modalities        CP                     Access Code: [email or web address]  URL: [email or web address]  Date: 11/09/2023  Prepared by: Sonali Peña    Exercises  - Standing Shoulder Row with Anchored Resistance  - 1 x daily - 7 x weekly - 3 sets - 10 reps  - Shoulder extension with resistance - Neutral  - 1 x daily - 3 sets - 10 reps

## 2023-12-26 ENCOUNTER — OFFICE VISIT (OUTPATIENT)
Dept: PHYSICAL THERAPY | Facility: CLINIC | Age: 83
End: 2023-12-26
Payer: MEDICARE

## 2023-12-26 DIAGNOSIS — M25.512 ACUTE PAIN OF LEFT SHOULDER: ICD-10-CM

## 2023-12-26 DIAGNOSIS — M77.8 TENDONITIS OF SHOULDER, LEFT: Primary | ICD-10-CM

## 2023-12-26 PROCEDURE — 97110 THERAPEUTIC EXERCISES: CPT

## 2023-12-26 PROCEDURE — 97140 MANUAL THERAPY 1/> REGIONS: CPT

## 2023-12-26 PROCEDURE — 97112 NEUROMUSCULAR REEDUCATION: CPT

## 2023-12-26 NOTE — PROGRESS NOTES
Daily Note     Today's date: 2023  Patient name: Amish Pulido  : 1940  MRN: [id number]  Referring provider: Daniel Malone MD  Dx:   Encounter Diagnosis     ICD-10-CM    1. Tendonitis of shoulder, left  M77.8       2. Acute pain of left shoulder  M25.512           Start Time: 1100  Stop Time: 1145  Total time in clinic (min): 45 minutes    Subjective: Reports no pain taking items in and out of the fridge, and bringing coffee cup up to his mouth. Still has difficulty and some pain c/ brining LUE up back.       Objective: See treatment diary below      Assessment: Tolerated treatment well. Patient demonstrated fatigue post treatment, exhibited good technique with therapeutic exercises, and would benefit from continued PT. Demonstrates improved behind back mobility c/ towel stretch and continued progression in strength of RTC, though still requires vc's for appropriate technique c/ side-lying exercises.       Plan: Continue per plan of care.      Precautions: None      Daily Treatment Diary:      Initial Evaluation Date: 23  Re-evaluation: 23  POC Expiration: 24  Compliance    Visit Number 11 12 13  14  5  6  7  8  9 10   Re-Eval                RE    Foto Captured Y                        Date    Manuals        Shoulder PROM KS KS SJ KS KS   GH/Lateral Distraction        Posterior [place] KS Gr III KS Gr II KS Gr II KS Gr III KS Gr III   STM to L Biceps Tendon KS Infra, Supra, Biceps LH KS Infra,Supra,Biceps, LH Infra,Supra,Biceps, LH KS Infra, Supra, Biceps LH KS Infra, Supra, Biceps LH, subscap                   Neuro Re-Ed        SA Punches  HEP - nv np   Push-up Plus 3/10ea 3/10ea 3x10ea 3/10ea nv   Blackburns: Row, Ext, HABD, 90/90 ER, Y 10# Row & Ext 3/10ea 10# Row & Ext 3/10ea 10# Row & Ext 3x10ea 10# Row & Ext 2/15ea 10# Row & Ext 2/15ea           Ther Ex        Pec Stretch  nv    nv   Sleeper  "Stretch 10\"/10 10\"/10 10\"x10 10\"/10 10\"/10   S/Lying ER & ABD 4#/0# 3/10ea 4#/0# 3/10ea 4#/0# 3x10ea 4#/0# 3/10ea 4#/0# 3/10ea   Standing TB ER/IR  HEP -     Standing TB Row & Ext  HEP -     Shoulder FLX & Scap Painfree ROM 1# 3/10ea Painfree ROM 1# 2/10ea Painfree ROM 1# 2/10ea Pain free ROM 1# 2/10ea Pain free ROM 1# 2/10ea +ABD   UBE: UE Muscular Endurance 2'/2' lvl 1.8 2'/2' lvl 1.9 2'/2' lvl 2.0 2'/2' Lvl 2.0 2'/2' lvl 1.8   Education                Ther Activity                        Modalities        CP                     Access Code: [email or web address]  URL: [email or web address]  Date: 11/09/2023  Prepared by: Sonali Peña    Exercises  - Standing Shoulder Row with Anchored Resistance  - 1 x daily - 7 x weekly - 3 sets - 10 reps  - Shoulder extension with resistance - Neutral  - 1 x daily - 3 sets - 10 reps               "

## 2023-12-28 ENCOUNTER — OFFICE VISIT (OUTPATIENT)
Dept: PHYSICAL THERAPY | Facility: CLINIC | Age: 83
End: 2023-12-28
Payer: MEDICARE

## 2023-12-28 DIAGNOSIS — M77.8 TENDONITIS OF SHOULDER, LEFT: Primary | ICD-10-CM

## 2023-12-28 DIAGNOSIS — M25.512 ACUTE PAIN OF LEFT SHOULDER: ICD-10-CM

## 2023-12-28 PROCEDURE — 97110 THERAPEUTIC EXERCISES: CPT

## 2023-12-28 PROCEDURE — 97112 NEUROMUSCULAR REEDUCATION: CPT

## 2023-12-28 PROCEDURE — 97140 MANUAL THERAPY 1/> REGIONS: CPT

## 2023-12-28 NOTE — PROGRESS NOTES
"Daily Note     Today's date: 2023  Patient name: Amish Pulido  : 1940  MRN: [id number]  Referring provider: Daniel Malone MD  Dx:   Encounter Diagnosis     ICD-10-CM    1. Tendonitis of shoulder, left  M77.8       2. Acute pain of left shoulder  M25.512           Start Time: 1300  Stop Time: 1345  Total time in clinic (min): 45 minutes    Subjective: Reports he is feeling achy today, but tried the motion he uses for fly fishing and was pain-free.      Objective: See treatment diary below      Assessment: Tolerated treatment well. Patient demonstrated fatigue post treatment, exhibited good technique with therapeutic exercises, and would benefit from continued PT. AROM near full pain-free. Able to increase resistance c/ standing shoulder elevation and side-lying abduction s/ increase in pain this visit.       Plan: Continue per plan of care.      Precautions: None      Daily Treatment Diary:      Initial Evaluation Date: 23  Re-evaluation: 23  POC Expiration: 24  Compliance    Visit Number 11 12 13  14  15  6  7  8  9 10   Re-Eval                RE    Foto Captured Y                        Date    Manuals        Shoulder PROM KS KS SJ KS KS   GH/Lateral Distraction        Posterior [place] KS Gr III KS Gr II KS Gr II KS Gr III KS Gr III   STM to L Biceps Tendon KS Infra, Supra, Biceps LH KS Infra,Supra,Biceps, LH Infra,Supra,Biceps, LH KS Infra, Supra, Biceps LH KS Infra, Supra, Biceps LH, subscap                   Neuro Re-Ed        SA Punches  HEP - nv    Push-up Plus 3/10ea 3/10ea 3x10ea 3/10ea nv   Black[place]urns: Row, Ext, HABD, 90/90 ER, Y 10# Row & Ext 3/10ea 10# Row & Ext 3/10ea 10# Row & Ext 3x10ea 10# Row & Ext 2/15ea 10# Row & Ext 2/15ea           Ther Ex        Pec Stretch  nv       Sleeper Stretch 10\"/10 10\"/10 10\"x10 10\"/10 10\"/10   S/Lying ER & ABD 4#/0# 3/10ea 4#/0# 3/10ea 4#/0# 3x10ea " 4#/0# 3/10ea 4#/1# 3/10ea   Standing TB ER/IR  HEP -     Standing TB Row & Ext  HEP -     Shoulder FLX & Scap Painfree ROM 1# 3/10ea Painfree ROM 1# 2/10ea Painfree ROM 1# 2/10ea Pain free ROM 1# 2/10ea Pain free ROM 2# 2/10ea +ABD   UBE: UE Muscular Endurance 2'/2' lvl 1.8 2'/2' lvl 1.9 2'/2' lvl 2.0 2'/2' Lvl 2.0 2'/2' lvl 1.8   Education                Ther Activity                        Modalities        CP                     Access Code: [email or web address]  URL: [email or web address]  Date: 11/09/2023  Prepared by: Sonali Peña    Exercises  - Standing Shoulder Row with Anchored Resistance  - 1 x daily - 7 x weekly - 3 sets - 10 reps  - Shoulder extension with resistance - Neutral  - 1 x daily - 3 sets - 10 reps

## 2024-01-02 ENCOUNTER — OFFICE VISIT (OUTPATIENT)
Dept: PHYSICAL THERAPY | Facility: CLINIC | Age: 84
End: 2024-01-02
Payer: MEDICARE

## 2024-01-02 DIAGNOSIS — M25.512 ACUTE PAIN OF LEFT SHOULDER: ICD-10-CM

## 2024-01-02 DIAGNOSIS — M77.8 TENDONITIS OF SHOULDER, LEFT: Primary | ICD-10-CM

## 2024-01-02 PROCEDURE — 97110 THERAPEUTIC EXERCISES: CPT

## 2024-01-02 PROCEDURE — 97140 MANUAL THERAPY 1/> REGIONS: CPT

## 2024-01-02 PROCEDURE — 97112 NEUROMUSCULAR REEDUCATION: CPT

## 2024-01-02 NOTE — PROGRESS NOTES
"Daily Note     Today's date: 2024  Patient name: Amish Pulido  : 1940  MRN: [id number]  Referring provider: Daniel Malone MD  Dx:   Encounter Diagnosis     ICD-10-CM    1. Tendonitis of shoulder, left  M77.8       2. Acute pain of left shoulder  M25.512           Start Time: 0930  Stop Time: 1015  Total time in clinic (min): 45 minutes    Subjective: Reports he still has pain when sleeping on L side and when reaching behind back, but otherwise is able to reach forward and out to the side s/ pain.       Objective: See treatment diary below      Assessment: Tolerated treatment well. Patient demonstrated fatigue post treatment, exhibited good technique with therapeutic exercises, and would benefit from continued PT. Reviewed alternate bent over technique for blackburns at home or other gym. Patient verbalized understanding.       Plan: Continue per plan of care.      Precautions: None      Daily Treatment Diary:      Initial Evaluation Date: 23  Re-evaluation: 23  POC Expiration: 24  Compliance    Visit Number 11 12 13  14  15  16  7  8  9 10   Re-Eval                RE    Foto Captured Y                        Date    Manuals        Shoulder PROM KS KS SJ KS KS   GH/Lateral Distraction        Posterior [place] KS Gr III KS Gr II KS Gr II KS Gr III KS Gr III   STM to L Biceps Tendon KS Infra, Supra, Biceps LH, Subscap KS Infra,Supra,Biceps, LH Infra,Supra,Biceps, LH KS Infra, Supra, Biceps LH KS Infra, Supra, Biceps LH, subscap                   Neuro Re-Ed        SA Punches  HEP - nv    Push-up Plus 3/10ea Low [place] 3/10ea 3x10ea 3/10ea nv   Blackburns: Row, Ext, HABD, 90/90 ER, Y 10# Row & Ext 2/15ea 10# Row & Ext 3/10ea 10# Row & Ext 3x10ea 10# Row & Ext 2/15ea 10# Row & Ext 2/15ea           Ther Ex        Pec Stretch         Sleeper Stretch 10\"/10 10\"/10 10\"x10 10\"/10 10\"/10   S/Lying ER & ABD 4#/1# " 3/10ea 4#/0# 3/10ea 4#/0# 3x10ea 4#/0# 3/10ea 4#/1# 3/10ea   Standing TB ER/IR  HEP -     Standing TB Row & Ext  HEP -     Shoulder FLX & Scap Painfree ROM 2# 3/10ea Painfree ROM 1# 2/10ea Painfree ROM 1# 2/10ea Pain free ROM 1# 2/10ea Pain free ROM 2# 2/10ea +ABD   UBE: UE Muscular Endurance 2'/2' lvl 2.0 2'/2' lvl 1.9 2'/2' lvl 2.0 2'/2' Lvl 2.0 2'/2' lvl 1.8   Education                Ther Activity                        Modalities        CP                     Access Code: [email or web address]  URL: [email or web address]  Date: 11/09/2023  Prepared by: Sonali Peña    Exercises  - Standing Shoulder Row with Anchored Resistance  - 1 x daily - 7 x weekly - 3 sets - 10 reps  - Shoulder extension with resistance - Neutral  - 1 x daily - 3 sets - 10 reps

## 2024-01-04 ENCOUNTER — OFFICE VISIT (OUTPATIENT)
Dept: PHYSICAL THERAPY | Facility: CLINIC | Age: 84
End: 2024-01-04
Payer: MEDICARE

## 2024-01-04 DIAGNOSIS — M77.8 TENDONITIS OF SHOULDER, LEFT: Primary | ICD-10-CM

## 2024-01-04 DIAGNOSIS — M25.512 ACUTE PAIN OF LEFT SHOULDER: ICD-10-CM

## 2024-01-04 PROCEDURE — 97110 THERAPEUTIC EXERCISES: CPT

## 2024-01-04 PROCEDURE — 97112 NEUROMUSCULAR REEDUCATION: CPT

## 2024-01-04 PROCEDURE — 97140 MANUAL THERAPY 1/> REGIONS: CPT

## 2024-01-04 NOTE — PROGRESS NOTES
"Daily Note     Today's date: 2024  Patient name: Amish Pulido  : 1940  MRN: [id number]  Referring provider: Daniel Malone MD  Dx:   Encounter Diagnosis     ICD-10-CM    1. Tendonitis of shoulder, left  M77.8       2. Acute pain of left shoulder  M25.512           Start Time: 0930  Stop Time: 1025  Total time in clinic (min): 55 minutes    Subjective: Reports he is feeling a lot better, still only has pain and stiffness c/ behind his back. Elevation is pain-free at this point.       Objective: See treatment diary below      Assessment: Tolerated treatment well. Patient demonstrated fatigue post treatment, exhibited good technique with therapeutic exercises, and would benefit from continued PT. Demonstrates full AROM and pain-free resisted elevation. IR to T8 after stretching c/ cane.       Plan: Continue per plan of care.      Precautions: None      Daily Treatment Diary:      Initial Evaluation Date: 23  Re-evaluation: 23  POC Expiration: 24  Compliance    Visit Number 11 12 13  14  15  16  17   9 10   Re-Eval               RE    Foto Captured Y                       Date    Manuals        Shoulder PROM KS KS SJ KS KS   GH/Lateral Distraction        Posterior [place] KS Gr III KS Gr II KS Gr II KS Gr III KS Gr III   STM to L Biceps Tendon KS Infra, Supra, Biceps LH, Subscap KS Infra,Supra,Biceps, LH Infra,Supra,Biceps, LH KS Infra, Supra, Biceps LH KS Infra, Supra, Biceps LH, subscap                   Neuro Re-Ed        SA Punches   - nv    Push-up Plus 3/10ea Low Plinth 3/10ea Low Plinth 3x10ea 3/10ea nv   Black[place]urns: Row, Ext, HABD, 90/90 ER, Y 10# Row & Ext 2/15ea 10# Row & Ext 3/10ea 10# Row & Ext 3x10ea 10# Row & Ext 2/15ea 10# Row & Ext 2/15ea           Ther Ex        Pec Stretch   10\"/10      Sleeper Stretch 10\"/10 10\"/10 10\"x10 10\"/10 10\"/10   S/Lying ER & ABD 4#/1# 3/10ea 4#/2# 3/10ea 4#/0# 3x10ea 4#/0# " "3/10ea 4#/1# 3/10ea   Cane IR  10\"/10 -     Standing TB Row & Ext   -     Shoulder FLX & Scap Painfree ROM 2# 3/10ea Pain-free ROM 2# 2/10ea Painfree ROM 1# 2/10ea Pain free ROM 1# 2/10ea Pain free ROM 2# 2/10ea +ABD   UBE: UE Muscular Endurance 2'/2' lvl 2.0 2'/2' lvl 2.0 2'/2' lvl 2.0 2'/2' Lvl 2.0 2'/2' lvl 1.8   Education                Ther Activity                        Modalities        CP                                        "

## 2024-01-08 ENCOUNTER — OFFICE VISIT (OUTPATIENT)
Dept: PSYCHIATRY | Facility: CLINIC | Age: 84
End: 2024-01-08
Payer: MEDICARE

## 2024-01-08 VITALS
BODY MASS INDEX: 24.34 KG/M2 | HEART RATE: 62 BPM | WEIGHT: 170 LBS | HEIGHT: 70 IN | DIASTOLIC BLOOD PRESSURE: 80 MMHG | SYSTOLIC BLOOD PRESSURE: 149 MMHG

## 2024-01-08 DIAGNOSIS — F41.1 GAD (GENERALIZED ANXIETY DISORDER): ICD-10-CM

## 2024-01-08 DIAGNOSIS — J32.0 MAXILLARY SINUSITIS, UNSPECIFIED CHRONICITY: ICD-10-CM

## 2024-01-08 DIAGNOSIS — G47.00 INSOMNIA, UNSPECIFIED TYPE: ICD-10-CM

## 2024-01-08 DIAGNOSIS — F33.0 MAJOR DEPRESSIVE DISORDER, RECURRENT, MILD (HCC): Primary | ICD-10-CM

## 2024-01-08 PROCEDURE — 90833 PSYTX W PT W E/M 30 MIN: CPT | Performed by: STUDENT IN AN ORGANIZED HEALTH CARE EDUCATION/TRAINING PROGRAM

## 2024-01-08 PROCEDURE — 99214 OFFICE O/P EST MOD 30 MIN: CPT | Performed by: STUDENT IN AN ORGANIZED HEALTH CARE EDUCATION/TRAINING PROGRAM

## 2024-01-08 RX ORDER — AMOXICILLIN AND CLAVULANATE POTASSIUM 875; 125 MG/1; MG/1
1 TABLET, FILM COATED ORAL EVERY 12 HOURS SCHEDULED
Qty: 20 TABLET | Refills: 0 | Status: SHIPPED | OUTPATIENT
Start: 2024-01-08 | End: 2024-01-18

## 2024-01-08 RX ORDER — KETOCONAZOLE 20 MG/G
CREAM TOPICAL DAILY
COMMUNITY
Start: 2023-11-01

## 2024-01-08 RX ORDER — DULOXETIN HYDROCHLORIDE 60 MG/1
120 CAPSULE, DELAYED RELEASE ORAL DAILY
Qty: 180 CAPSULE | Refills: 1 | Status: SHIPPED | OUTPATIENT
Start: 2024-01-08

## 2024-01-08 RX ORDER — CLONIDINE HYDROCHLORIDE 0.1 MG/1
0.1 TABLET ORAL EVERY 12 HOURS SCHEDULED
Qty: 180 TABLET | Refills: 0 | Status: SHIPPED | OUTPATIENT
Start: 2024-01-08 | End: 2024-04-07

## 2024-01-08 RX ORDER — AZITHROMYCIN 250 MG/1
250 TABLET, FILM COATED ORAL EVERY 24 HOURS
COMMUNITY
Start: 2024-01-03

## 2024-01-08 NOTE — BH TREATMENT PLAN
TREATMENT PLAN (Medication Management Only)        [facility]    Name and Date of Birth:  Amish Pulido 83 y.o. 1940  Date of Treatment Plan: January 8, 2024  Diagnosis/Diagnoses:    1. Major depressive disorder, recurrent, mild (HCC)    2. CHARISMA (generalized anxiety disorder)    3. Insomnia, unspecified type    4. Maxillary sinusitis, unspecified chronicity      Strengths/Personal Resources for Self-Care: supportive family, supportive friends, taking medications as prescribed, ability to adapt to life changes, ability to communicate needs, ability to communicate well, ability to listen, ability to reason, ability to understand psychiatric illness, average or above intelligence, family ties, financial means, financial security, general fund of knowledge, good physical health, good understanding of illness, independence, motivation for treatment, ability to negotiate basic needs, Hinduism affiliation, being resoureceful, self-reliance, sense of humor, special hobby/interest, stable employment, strong sung, well educated, willingness to work on problems, work skills.  Area/Areas of need (in own words): anxiety symptoms, depressive symptoms  1. Long Term Goal: maintain acceptable anxiety level.  Target Date:6 months - 7/8/2024  Person/Persons responsible for completion of goal: Amish  2.  Short Term Objective (s) - How will we reach this goal?:   A. Provider new recommended medication/dosage changes and/or continue medication(s): Medication changes: I have discontinued Amish Pulido's esomeprazole and cetirizine. I have also changed his DULoxetine and amoxicillin-clavulanate. Additionally, I am having him maintain his fluticasone, ibuprofen, timolol, atorvastatin, Prolensa, Aspirin-Acetaminophen-Caffeine (EXCEDRIN MIGRAINE PO), albuterol, Flovent HFA, Bromfenac Sodium (Once-Daily), omeprazole, azithromycin, ketoconazole, and cloNIDine..  B. Avoid alcohol .  C. Attend  medication management appointments regularly.  Target Date:6 months - 7/8/2024  Person/Persons Responsible for Completion of Goal: Amish  Progress Towards Goals: starting treatment  Treatment Modality: medication management with psychotherapy every 3 months  Review due 180 days from date of this plan: 6 months - 7/8/2024  Expected length of service: maintenance  My Physician/PA/NP and I have developed this plan together and I agree to work on the goals and objectives. I understand the treatment goals that were developed for my treatment.

## 2024-01-08 NOTE — PSYCH
MEDICATION MANAGEMENT NOTE        [facility]      Name and Date of Birth:  Amish Pulido 83 y.o. 1940 MRN: [id number]    Date of Visit: January 8, 2024    Reason for Visit:   Chief Complaint   Patient presents with    Follow-up       SUBJECTIVE:    Amish is seen today for a follow up for Generalized Anxiety Disorder and insomnia. He continues to do relatively well since the last visit. He reports that on and off anxiety symptoms have been fairly stable, reports That morning fatigue improved .  Patient is currently on Clonidine 0.1-0.2 mg at bedtime and Cymbalta 60 mg BID for last 2 month. He is no longer on Lexapro.  He reported that clonidine has been working well for his insomnia but he feels groggy a little bit in the morning.  He reports being worried about his daughter recently after having a seizure knowing that she had a brain tumor which was operated on the same week.  His daughter moved to live with them for the next few weeks until she is able to take care of herself.  He continues to be active but was able to lose weight.  He also currently doing physical therapy for his shoulder.    He denies any suicidal ideation, intent or plan at present; denies any homicidal ideation, intent or plan at present.    He denies any auditory hallucinations, denies any visual hallucinations, denies any overt delusions.    He reports some tiredness.      HPI ROS Appetite Changes and Sleep:     He reports adequate number of sleep hours, normal appetite, normal energy level      Review Of Systems:      Constitutional negative   ENT sinus pain   Cardiovascular negative   Respiratory negative   Gastrointestinal negative   Genitourinary negative   Musculoskeletal negative   Integumentary negative   Neurological headache   Endocrine negative   Other Symptoms none, all other systems are negative         Past Medical History:    Past Medical History:   Diagnosis Date    GERD  (gastroesophageal reflux disease)     Glaucoma     High cholesterol         Past Surgical History:   Procedure Laterality Date    EYE SURGERY      HERNIA REPAIR       Allergies   Allergen Reactions    Cat Hair Extract Cough    Horse-Derived Products Cough    Pollen Extract Cough       Substance Abuse History:    Social History     Substance and Sexual Activity   Alcohol Use Not Currently     Social History     Substance and Sexual Activity   Drug Use Never       Social History:    Social History     Socioeconomic History    Marital status: Unknown     Spouse name: Not on file    Number of children: Not on file    Years of education: Not on file    Highest education level: Not on file   Occupational History    Not on file   Tobacco Use    Smoking status: Never    Smokeless tobacco: Never   Vaping Use    Vaping status: Never Used   Substance and Sexual Activity    Alcohol use: Not Currently    Drug use: Never    Sexual activity: Not Currently   Other Topics Concern    Not on file   Social History Narrative    Not on file     Social Determinants of Health     Financial Resource Strain: Low Risk  (12/10/2022)    Received from [facility]    Overall Financial Resource Strain (CARDIA)     Difficulty of Paying Living Expenses: Not hard at all   Recent Concern: Financial Resource Strain - Medium Risk (10/19/2022)    Overall Financial Resource Strain (CARDIA)     Difficulty of Paying Living Expenses: Somewhat hard   Food Insecurity: No Food Insecurity (12/10/2022)    Received from [facility]    Hunger Vital Sign     Worried About Running Out of Food in the Last Year: Never true     Ran Out of Food in the Last Year: Never true   Transportation Needs: No Transportation Needs (12/10/2022)    Received from [facility]    PRAPARE - Transportation     Lack of Transportation (Medical): No     Lack of Transportation (Non-Medical): No   Physical Activity: Sufficiently Active  (12/10/2022)    Received from [facility]    Exercise Vital Sign     Days of Exercise per Week: 5 days     Minutes of Exercise per Session: 30 min   Stress: No Stress Concern Present (12/10/2022)    Received from [facility]    [place] [employer] - Occupational Stress Questionnaire     Feeling of Stress : Only a little   Social Connections: Moderately Isolated (12/10/2022)    Received from [facility]    Social Connection and Isolation Panel [NHANES]     Frequency of Communication with Friends and Family: More than three times a week     Frequency of Social Gatherings with Friends and Family: Once a week     Attends Hinduism Services: Never     Active Member of Clubs or Organizations: No     Attends Club or Organization Meetings: Patient refused     Marital Status:    Intimate Partner Violence: Not At Risk (12/10/2022)    Received from [facility]    Humiliation, Afraid, Rape, and Kick questionnaire     Fear of Current or Ex-Partner: No     Emotionally Abused: No     Physically Abused: No     Sexually Abused: No   Housing Stability: Low Risk  (12/10/2022)    Received from [facility]    Housing Stability Vital Sign     Unable to Pay for Housing in the Last Year: No     Number of Places Lived in the Last Year: 1     Unstable Housing in the Last Year: No       Family Psychiatric History:     Family History   Problem Relation Age of Onset    Completed Suicide  Mother     Cancer Father        History Review: The following portions of the patient's history were reviewed and updated as appropriate: allergies, current medications, past family history, past medical history, past social history, past surgical history and problem list.         OBJECTIVE:     Vital signs in last 24 hours:    There were no vitals filed for this visit.      Mental Status Evaluation:    Appearance age appropriate, casually dressed    Behavior cooperative, calm   Speech normal rate, normal volume, normal pitch   Mood improved, anxious   Affect normal range and intensity, appropriate   Thought Processes organized, goal directed   Associations intact associations   Thought Content no overt delusions   Perceptual Disturbances: no auditory hallucinations, no visual hallucinations   Abnormal Thoughts  Risk Potential Suicidal ideation - None  Homicidal ideation - None  Potential for aggression - No   Orientation oriented to person, place, time/date and situation   Memory recent and remote memory grossly intact   Consciousness alert and awake   Attention Span Concentration Span attention span and concentration are age appropriate   Intellect appears to be of average intelligence   Insight intact   Judgement intact   Muscle Strength and  Gait normal muscle strength and normal muscle tone, normal gait and normal balance   Motor activity no abnormal movements   Language no difficulty naming common objects, no difficulty repeating a phrase, no difficulty writing a sentence   Fund of Knowledge adequate knowledge of current events  adequate fund of knowledge regarding past history  adequate fund of knowledge regarding vocabulary    Pain none   Pain Scale 0       Laboratory Results: I have personally reviewed all pertinent laboratory/tests results    Most Recent Labs:   Collected 4/11/2023  8:32 AM          Component Ref Range & Units 2 mo ago   Hemoglobin A1C <5.7 % 5.8 High     Comment: Reference Range   Non-diabetic                     <5.7   Pre-diabetic                     5.7-6.4   Diabetic                         >=6.5   ADA target for diabetic control  <=7   eAG, EST AVG Glucose mg/dL 120         View Full Report        Specimen Collected: 04/11/23  8:32 AM    Performed By: [facility] ([email or web address]) Last Resulted: 04/11/23  3:14 PM   Received From: [facility]  Result Received: 06/12/23 10:24 AM       Received Information                 TSH, 3RD GENERATION  Order: [id number]   suggestion  Result Information displayed in this report will not trend and may not trigger automated decision support.      Ref Range & Units 2 mo ago   Thyroid Stimulating Hormone 0.45 - 5.33 uIU/mL 2.04      Specimen Collected: 04/11/23  8:32 AM    Performed by: [facility] ([facility]NL1) Last Resulted: 04/11/23  2:16 PM   Received From: [facility]  Result Received: 06/12/23 10:24 AM    Received Information  LIPID PANEL  Order: [id number]   suggestion  Result Information displayed in this report will not trend and may not trigger automated decision support.      Ref Range & Units 2 mo ago   Cholesterol <200 mg/dL 167    Triglyceride <150 mg/dL 149    Cholesterol, HDL, Direct 23 - 92 mg/dL 52    Cholesterol, Non-HDL <160 mg/dL 115    Cholesterol, LDL, Calculated <130 mg/dL 85    Comment: LDL Cholesterol was calculated using the Friedewald equation. Direct measurement of LDL is not indicated for this patient based on [facility]'s analytical algorithm for measurement of LDL Cholesterol.   CHOL/HDL Ratio  3.2      Specimen Collected: 04/11/23  8:32 AM    Performed by: [facility] ([email or web address]) Last Resulted: 04/11/23  2:20 PM   Received From: [facility]  Result Received: 06/12/23 10:24 AM    Received Information   Contains abnormal data COMPREHENSIVE METABOLIC PANEL  Order: [id number]   suggestion  Result Information displayed in this report will not trend and may not trigger automated decision support.      Ref Range & Units 2 mo ago   Glucose 65 - 99 mg/dL 121 High     BUN 7 - 28 mg/dL 26    Creatinine 0.53 - 1.30 mg/dL 0.95    Sodium 135 - 145 mmol/L 141    Potassium 3.5 - 5.2 mmol/L 4.4    Chloride 100 - 109 mmol/L 102    Carbon Dioxide 21 - 31 mmol/L 32 High     Calcium 8.5 - 10.1 mg/dL 9.1    Alkaline Phosphatase 35 - 120 U/L 56    Albumin 3.5 - 5.7 g/dL 3.8    Bilirubin, Total 0.2 - 1.0 mg/dL 0.7    Comment: Eltrombopag and its metabolites may interfere  with this assay causing erroneously high patient results.   Protein, Total 6.3 - 8.3 g/dL 6.4    AST <41 U/L 18    ALT <56 U/L 25    Anion Gap 3 - 11 7    eGFRcr >59 80    eGFRcr Comment  Interpretive information: calculated GFR    Comment:                                            Units: mL/min per 1.73 square meters   Reported eGFR is based on the CKD-EPI 2021 creatinine equation that does not use a race coefficient and conforms to the NKF-ASN Task Force Recommendations.   Note: Calculated GFR may not be an accurate indicator of renal function if the patient's renal function is not in a steady state.                                              GFR Categories in Chronic Kidney Disease (CKD):   GFR        GFR (mL/min/1.73   Category:  square meters):  Interpretation:   G1         90 or greater    Normal or high*   G2         60 - 89          Mild decrease*   G3a        45 - 59          Mild to moderate decrease   G3b        30 - 44          Moderate to severe decrease   G4         15 - 29          Severe decrease   G5         14 or less       Kidney failure                                              *In the absence of evidence of kidney damage, neither GFR category G1 or G2 fulfill the criteria for CKD. Kidney Int Suppl 2013, 3: 1-1 50.     Specimen Collected: 04/11/23  8:32 AM    Performed by: [facility] ([facility]1) Last Resulted: 04/11/23  2:20 PM   Received From: [facility]  Result Received: 06/12/23 10:24 AM    Received Information  FASTING STATUS  Order: [id number]   suggestion  Information displayed in this report may not trend or trigger automated decision support.     Component 2 mo ago   Fasting Status:  Fasting      Specimen Collected: 04/11/23  8:32 AM    Performed by: [facility]IC[facility] Last Resulted: 04/11/23  1:11 PM   Received From: [facility]  Result Received: 06/12/23 10:24 AM    Received Information    Suicide/Homicide Risk Assessment:    Risk of Harm to Self:  The  following ratings are based on assessment at the time of the interview  Based on today's assessment, Amish presents the following risk of harm to self: minimal    Risk of Harm to Others:  The following ratings are based on assessment at the time of the interview  Based on today's assessment, Amish presents the following risk of harm to others: minimal    The following interventions are recommended: no intervention changes needed    Assessment/Plan:  Amish Pulido is 83 years old adult male patient was seen today for her scheduled follow-up for management of his insomnia, anxiety and depression. Anxiety and depression were partially responding to Lexapro so we added Wellbutrin however patient is not tolerating Wellbutrin and does not feel it is helpful and prefers to take higher dose of Lexapro.  We discontinued Wellbutrin and maximize Lexapro to 20 mg. we tried for his insomnia ramelteon but failed and had the best results with clonidine.  In the previous visits we decided to switch him from Lexapro to Cymbalta to target fatigue associated with depression. We Tapered Lexapro off and currently is taking the maximum dose of Cymbalta and no more changes is required today     Diagnoses and all orders for this visit:    Maxillary sinusitis, unspecified chronicity  -     amoxicillin-clavulanate (AUGMENTIN) 875-125 mg per tablet; Take 1 tablet by mouth every 12 (twelve) hours for 10 days    CHARISMA (generalized anxiety disorder)  -     cloNIDine (CATAPRES) 0.1 mg tablet; Take 1 tablet (0.1 mg total) by mouth every 12 (twelve) hours Hold medication if blood pressure lower than 90/60  -     DULoxetine (CYMBALTA) 60 mg delayed release capsule; Take 2 capsules (120 mg total) by mouth daily    Insomnia, unspecified type  -     cloNIDine (CATAPRES) 0.1 mg tablet; Take 1 tablet (0.1 mg total) by mouth every 12 (twelve) hours Hold medication if blood pressure lower than 90/60    Major depressive disorder, recurrent, mild (HCC)    Other  orders  -     azithromycin (ZITHROMAX) 250 mg tablet; Take 250 mg by mouth every 24 hours  -     ketoconazole (NIZORAL) 2 % cream; Apply topically daily              Treatment Recommendations/Precautions:      Medication changes: I am having Amish Pulido maintain his fluticasone, ibuprofen, timolol, atorvastatin, esomeprazole, Prolensa, Aspirin-Acetaminophen-Caffeine (EXCEDRIN MIGRAINE PO), albuterol, Flovent HFA, Bromfenac Sodium (Once-Daily), omeprazole, cetirizine, amoxicillin-clavulanate, cloNIDine, and DULoxetine.    Current Outpatient Medications:     albuterol (PROVENTIL HFA,VENTOLIN HFA) 90 mcg/act inhaler, , Disp: , Rfl:     amoxicillin-clavulanate (AUGMENTIN) 875-125 mg per tablet, TAKE 1 TAB BY MOUTH EVERY 12 HOURS, Disp: , Rfl:     Aspirin-Acetaminophen-Caffeine (EXCEDRIN MIGRAINE PO), Take 1 tablet by mouth daily as needed, Disp: , Rfl:     atorvastatin (LIPITOR) 40 mg tablet, Take 1 tablet by mouth daily, Disp: , Rfl:     Bromfenac Sodium, Once-Daily, 0.09 % SOLN, INSTILL 1 DROP INTO RIGHT EYE TWICE A DAY, Disp: , Rfl:     cetirizine (ZyrTEC) 5 MG tablet, Take 5 mg by mouth daily, Disp: , Rfl:     cloNIDine (CATAPRES) 0.1 mg tablet, Take 1 tablet (0.1 mg total) by mouth every 12 (twelve) hours Hold medication if blood pressure lower than 90/60, Disp: 180 tablet, Rfl: 0    DULoxetine (CYMBALTA) 60 mg delayed release capsule, TAKE 2 CAPSULES BY MOUTH DAILY, Disp: 180 capsule, Rfl: 1    esomeprazole (NexIUM) 40 MG capsule, Take 40 mg by mouth, Disp: , Rfl:     Flovent  MCG/ACT inhaler, Inhale 2 puffs 2 (two) times a day, Disp: , Rfl:     fluticasone (FLONASE) 50 mcg/act nasal spray, 2 sprays into each nostril daily, Disp: , Rfl:     ibuprofen (MOTRIN) 600 mg tablet, Take 600 mg by mouth every 6 (six) hours as needed, Disp: , Rfl:     omeprazole (PriLOSEC) 40 MG capsule, Take 40 mg by mouth daily, Disp: , Rfl:     Prolensa 0.07 % SOLN, , Disp: , Rfl:     timolol (TIMOPTIC) 0.5 % ophthalmic solution,  INSTILL ONE DROP INTO BOTH EYES TWICE DAILY, Disp: , Rfl:   Amish has a current medication list which includes the following prescription(s): albuterol, amoxicillin-clavulanate, aspirin-acetaminophen-caffeine, atorvastatin, bromfenac sodium (once-daily), cetirizine, clonidine, duloxetine, esomeprazole, flovent hfa, fluticasone, ibuprofen, omeprazole, prolensa, and timolol.  Aware of 24 hour and weekend coverage for urgent situations accessed by calling [facility] main practice number    Medications Risks/Benefits      Risks, Benefits And Possible Side Effects Of Medications:    Risks, benefits, and possible side effects of medications explained to Amish and he verbalizes understanding and agreement for treatment.    Controlled Medication Discussion:     Amish has been filling controlled prescriptions on time as prescribed according to [place] Prescription Drug Monitoring Program    Psychotherapy Provided:     Individual psychotherapy provided: Yes  Counseling was provided during the session today for 16 minutes.  Medications, treatment progress and treatment plan reviewed with Amish.  Medication changes discussed with Amish.  Medication education provided to Amish.  Importance of medication and treatment compliance reviewed with Amish.  Educated on importance of medication and treatment compliance.  Importance of follow up with family physician for medical issues reviewed with Amish.  Discussed with Amish acceptance of mental illness diagnosis and need for ongoing psychiatric treatment.  Importance of follow up for substance abuse issues discussed with Amish.  Supportive therapy provided.   Cognitive therapy was utilized during the session.  Reassurance and supportive therapy provided.   Reoriented to reality and reassured.      Treatment Plan:    Completed and signed during the session: Not applicable - Treatment Plan not due at this session    Note Share:    This note was not shared with the  patient due to reasonable likelihood of causing patient harm    Visit start and stop times:    Start Time:  10:30 AM  Stop Time:  10:55 AM    I spent 25 minutes directly with the patient during this visit    Malinda Mcmahon MD 01/08/24

## 2024-01-09 ENCOUNTER — OFFICE VISIT (OUTPATIENT)
Dept: PHYSICAL THERAPY | Facility: CLINIC | Age: 84
End: 2024-01-09
Payer: MEDICARE

## 2024-01-09 DIAGNOSIS — M25.512 ACUTE PAIN OF LEFT SHOULDER: ICD-10-CM

## 2024-01-09 DIAGNOSIS — M77.8 TENDONITIS OF SHOULDER, LEFT: Primary | ICD-10-CM

## 2024-01-09 PROCEDURE — 97112 NEUROMUSCULAR REEDUCATION: CPT

## 2024-01-09 PROCEDURE — 97110 THERAPEUTIC EXERCISES: CPT

## 2024-01-09 PROCEDURE — 97140 MANUAL THERAPY 1/> REGIONS: CPT

## 2024-01-09 NOTE — PROGRESS NOTES
Daily Note     Today's date: 2024  Patient name: Amish Pluido  : 1940  MRN: [id number]  Referring provider: Daniel Malone MD  Dx:   Encounter Diagnosis     ICD-10-CM    1. Tendonitis of shoulder, left  M77.8       2. Acute pain of left shoulder  M25.512           Start Time: 1345  Stop Time: 1430  Total time in clinic (min): 45 minutes    Subjective: Reports he was able to shovel the snow on Saturday. Had some mild pain in left shoulder c/ shoveling, but transient and stopped after he was done shoveling. No aggravation of pain c/ shoulder elevation after activity.       Objective: See treatment diary below      Assessment: Tolerated treatment well. Patient demonstrated fatigue post treatment, exhibited good technique with therapeutic exercises, and would benefit from continued PT. Demonstrates good technique c/ prone rows, will trial increase in weight at nv up to 12#. Tolerated increase in weight c/ standing flexion and abduction c/ good technique, though initially required cues to extend elbow and avoid shoulder hike.       Plan: Continue per plan of care. Re-eval next visit.      Precautions: None      Daily Treatment Diary:      Initial Evaluation Date: 23  Re-evaluation: 23  POC Expiration: 24  Compliance    Visit Number 11 12 13  14  15  16  17 18  9 10   Re-Eval               RE    Foto Captured Y                       Date    Manuals        Shoulder PROM KS KS KS KS KS   GH/Lateral Distraction        Posterior [place] KS Gr III KS Gr II KS Gr II KS Gr III KS Gr III   STM to L Biceps Tendon KS Infra, Supra, Biceps LH, Subscap KS Infra,Supra,Biceps, LH Infra,Supra,Biceps, LH KS KS Infra, Supra, Biceps LH KS Infra, Supra, Biceps LH, subscap                   Neuro Re-Ed        SA Punches   - nv    Push-up Plus 3/10ea Low Plinth nv 3x10ea 3/10ea nv   [place]: Row, Ext, HABD, 90/90 ER, Y 10# Row & Ext 2/15ea  "10# Row & Ext 3/10ea 10# Row & Ext 3x10ea 10# Row & Ext 2/15ea 10# Row & Ext 2/15ea           Ther Ex        Pec Stretch         Sleeper Stretch 10\"/10 10\"/10 10\"x10 10\"/10 10\"/10   S/Lying ER & ABD 4#/1# 3/10ea 4#/2# 3/10ea 4#/0# 3x10ea 4#/0# 3/10ea 4#/1# 3/10ea   Cane IR  10\"/10 -     Standing TB Row & Ext   -     Shoulder FLX & Scap Painfree ROM 2# 3/10ea Pain-free ROM 3# 2/10ea Painfree ROM 1# 2/10ea Pain free ROM 1# 2/10ea Pain free ROM 2# 2/10ea +ABD   UBE: UE Muscular Endurance 2'/2' lvl 2.0 2'/2' lvl 2.0 2'/2' lvl 2.0 2'/2' Lvl 2.0 2'/2' lvl 1.8   Education                Ther Activity                        Modalities        CP                                          "

## 2024-01-11 ENCOUNTER — EVALUATION (OUTPATIENT)
Dept: PHYSICAL THERAPY | Facility: CLINIC | Age: 84
End: 2024-01-11
Payer: MEDICARE

## 2024-01-11 DIAGNOSIS — M77.8 TENDONITIS OF SHOULDER, LEFT: Primary | ICD-10-CM

## 2024-01-11 DIAGNOSIS — M25.512 ACUTE PAIN OF LEFT SHOULDER: ICD-10-CM

## 2024-01-11 PROCEDURE — 97110 THERAPEUTIC EXERCISES: CPT

## 2024-01-11 PROCEDURE — 97140 MANUAL THERAPY 1/> REGIONS: CPT

## 2024-01-11 PROCEDURE — 97112 NEUROMUSCULAR REEDUCATION: CPT

## 2024-01-15 ENCOUNTER — DOCTOR'S OFFICE (OUTPATIENT)
Dept: URBAN - NONMETROPOLITAN AREA CLINIC 1 | Facility: CLINIC | Age: 84
Setting detail: OPHTHALMOLOGY
End: 2024-01-15
Payer: COMMERCIAL

## 2024-01-15 DIAGNOSIS — H35.81: ICD-10-CM

## 2024-01-15 DIAGNOSIS — H43.812: ICD-10-CM

## 2024-01-15 DIAGNOSIS — H40.1113: ICD-10-CM

## 2024-01-15 DIAGNOSIS — H40.1121: ICD-10-CM

## 2024-01-15 DIAGNOSIS — H35.371: ICD-10-CM

## 2024-01-15 DIAGNOSIS — H40.61X3: ICD-10-CM

## 2024-01-15 PROCEDURE — 99214 OFFICE O/P EST MOD 30 MIN: CPT | Performed by: OPHTHALMOLOGY

## 2024-01-15 PROCEDURE — 92134 CPTRZ OPH DX IMG PST SGM RTA: CPT | Performed by: OPHTHALMOLOGY

## 2024-01-15 ASSESSMENT — REFRACTION_MANIFEST
OD_AXIS: 115
OS_ADD: +2.50
OU_VA: 20/20
OD_ADD: +2.50
OD_VA2: 20/30-2
OD_SPHERE: -1.00
OS_VA2: 20/20
OS_SPHERE: +0.25
OD_CYLINDER: -0.25
OD_VA1: 20/30-2
OS_CYLINDER: -1.25
OS_VA1: 20/20
OS_AXIS: 090

## 2024-01-15 ASSESSMENT — REFRACTION_CURRENTRX
OS_CYLINDER: -1.50
OS_AXIS: 86
OS_SPHERE: +0.50
OS_ADD: +2.50
OD_SPHERE: -0.50
OD_VPRISM_DIRECTION: PROGS
OS_VPRISM_DIRECTION: PROGS
OD_AXIS: 111
OD_CYLINDER: -0.25
OD_ADD: +2.50
OS_OVR_VA: 20/
OD_OVR_VA: 20/

## 2024-01-15 ASSESSMENT — LID EXAM ASSESSMENTS
OS_BLEPHARITIS: LLL LUL
OD_BLEPHARITIS: RLL RUL

## 2024-01-15 ASSESSMENT — REFRACTION_AUTOREFRACTION
OS_SPHERE: -0.25
OS_CYLINDER: -1.00
OD_CYLINDER: 0.00
OD_SPHERE: -1.00
OS_AXIS: 093
OD_AXIS: 180

## 2024-01-15 ASSESSMENT — SPHEQUIV_DERIVED
OD_SPHEQUIV: -1.125
OD_SPHEQUIV: -1
OS_SPHEQUIV: -0.75
OS_SPHEQUIV: -0.375

## 2024-01-15 ASSESSMENT — CONFRONTATIONAL VISUAL FIELD TEST (CVF)
OD_FINDINGS: FULL
OS_FINDINGS: FULL

## 2024-02-05 ENCOUNTER — DOCTOR'S OFFICE (OUTPATIENT)
Dept: URBAN - NONMETROPOLITAN AREA CLINIC 1 | Facility: CLINIC | Age: 84
Setting detail: OPHTHALMOLOGY
End: 2024-02-05
Payer: COMMERCIAL

## 2024-02-05 VITALS — HEIGHT: 60 IN

## 2024-02-05 DIAGNOSIS — H40.1113: ICD-10-CM

## 2024-02-05 DIAGNOSIS — H40.61X3: ICD-10-CM

## 2024-02-05 DIAGNOSIS — H43.812: ICD-10-CM

## 2024-02-05 DIAGNOSIS — H35.371: ICD-10-CM

## 2024-02-05 DIAGNOSIS — H40.1121: ICD-10-CM

## 2024-02-05 DIAGNOSIS — H35.81: ICD-10-CM

## 2024-02-05 PROCEDURE — 92250 FUNDUS PHOTOGRAPHY W/I&R: CPT | Performed by: OPHTHALMOLOGY

## 2024-02-05 PROCEDURE — 92235 FLUORESCEIN ANGRPH MLTIFRAME: CPT | Performed by: OPHTHALMOLOGY

## 2024-02-05 PROCEDURE — 92012 INTRM OPH EXAM EST PATIENT: CPT | Performed by: OPHTHALMOLOGY

## 2024-02-05 ASSESSMENT — CONFRONTATIONAL VISUAL FIELD TEST (CVF)
OS_FINDINGS: FULL
OD_FINDINGS: FULL

## 2024-02-05 ASSESSMENT — LID EXAM ASSESSMENTS
OS_BLEPHARITIS: LLL LUL
OD_BLEPHARITIS: RLL RUL

## 2024-03-11 ENCOUNTER — DOCTOR'S OFFICE (OUTPATIENT)
Dept: URBAN - NONMETROPOLITAN AREA CLINIC 1 | Facility: CLINIC | Age: 84
Setting detail: OPHTHALMOLOGY
End: 2024-03-11
Payer: MEDICARE

## 2024-03-11 DIAGNOSIS — H35.371: ICD-10-CM

## 2024-03-11 DIAGNOSIS — H35.81: ICD-10-CM

## 2024-03-11 DIAGNOSIS — H40.1121: ICD-10-CM

## 2024-03-11 DIAGNOSIS — H40.1113: ICD-10-CM

## 2024-03-11 PROCEDURE — 92012 INTRM OPH EXAM EST PATIENT: CPT | Performed by: OPHTHALMOLOGY

## 2024-03-11 PROCEDURE — 92134 CPTRZ OPH DX IMG PST SGM RTA: CPT | Performed by: OPHTHALMOLOGY

## 2024-03-11 ASSESSMENT — LID EXAM ASSESSMENTS
OS_BLEPHARITIS: LLL LUL
OD_BLEPHARITIS: RLL RUL

## 2024-04-08 ENCOUNTER — DOCTOR'S OFFICE (OUTPATIENT)
Dept: URBAN - NONMETROPOLITAN AREA CLINIC 1 | Facility: CLINIC | Age: 84
Setting detail: OPHTHALMOLOGY
End: 2024-04-08
Payer: MEDICARE

## 2024-04-08 DIAGNOSIS — H40.1121: ICD-10-CM

## 2024-04-08 DIAGNOSIS — H35.81: ICD-10-CM

## 2024-04-08 DIAGNOSIS — H35.371: ICD-10-CM

## 2024-04-08 DIAGNOSIS — H04.121: ICD-10-CM

## 2024-04-08 DIAGNOSIS — H40.1113: ICD-10-CM

## 2024-04-08 DIAGNOSIS — H04.122: ICD-10-CM

## 2024-04-08 PROCEDURE — 83861 MICROFLUID ANALY TEARS: CPT | Mod: QW,RT | Performed by: OPHTHALMOLOGY

## 2024-04-08 PROCEDURE — 92134 CPTRZ OPH DX IMG PST SGM RTA: CPT | Performed by: OPHTHALMOLOGY

## 2024-04-08 PROCEDURE — 92202 OPSCPY EXTND ON/MAC DRAW: CPT | Performed by: OPHTHALMOLOGY

## 2024-04-08 PROCEDURE — 83861 MICROFLUID ANALY TEARS: CPT | Mod: QW,LT | Performed by: OPHTHALMOLOGY

## 2024-04-08 PROCEDURE — 99214 OFFICE O/P EST MOD 30 MIN: CPT | Performed by: OPHTHALMOLOGY

## 2024-04-08 ASSESSMENT — LID EXAM ASSESSMENTS
OS_BLEPHARITIS: LLL LUL
OD_BLEPHARITIS: RLL RUL

## 2024-04-15 ENCOUNTER — OFFICE VISIT (OUTPATIENT)
Dept: PSYCHIATRY | Facility: CLINIC | Age: 84
End: 2024-04-15
Payer: MEDICARE

## 2024-04-15 VITALS — DIASTOLIC BLOOD PRESSURE: 76 MMHG | SYSTOLIC BLOOD PRESSURE: 146 MMHG | HEART RATE: 54 BPM

## 2024-04-15 DIAGNOSIS — F41.1 GAD (GENERALIZED ANXIETY DISORDER): ICD-10-CM

## 2024-04-15 DIAGNOSIS — G47.00 INSOMNIA, UNSPECIFIED TYPE: ICD-10-CM

## 2024-04-15 PROBLEM — E78.01 FAMILIAL HYPERCHOLESTEROLEMIA: Status: ACTIVE | Noted: 2018-04-16

## 2024-04-15 PROBLEM — A69.20 LYME DISEASE: Status: RESOLVED | Noted: 2021-06-29 | Resolved: 2024-04-15

## 2024-04-15 PROBLEM — K40.90 HERNIA, INGUINAL, RIGHT: Status: RESOLVED | Noted: 2021-01-12 | Resolved: 2024-04-15

## 2024-04-15 PROBLEM — K76.89 LIVER CYST: Status: RESOLVED | Noted: 2021-03-22 | Resolved: 2024-04-15

## 2024-04-15 PROBLEM — K21.9 GASTROESOPHAGEAL REFLUX DISEASE WITHOUT ESOPHAGITIS: Status: ACTIVE | Noted: 2019-09-08

## 2024-04-15 PROBLEM — M19.90 OSTEOARTHRITIS: Status: ACTIVE | Noted: 2017-04-10

## 2024-04-15 PROBLEM — J01.00 ACUTE NON-RECURRENT MAXILLARY SINUSITIS: Status: RESOLVED | Noted: 2022-01-24 | Resolved: 2024-04-15

## 2024-04-15 PROCEDURE — 99214 OFFICE O/P EST MOD 30 MIN: CPT | Performed by: STUDENT IN AN ORGANIZED HEALTH CARE EDUCATION/TRAINING PROGRAM

## 2024-04-15 PROCEDURE — 90833 PSYTX W PT W E/M 30 MIN: CPT | Performed by: STUDENT IN AN ORGANIZED HEALTH CARE EDUCATION/TRAINING PROGRAM

## 2024-04-15 RX ORDER — DULOXETIN HYDROCHLORIDE 60 MG/1
120 CAPSULE, DELAYED RELEASE ORAL DAILY
Qty: 180 CAPSULE | Refills: 1 | Status: SHIPPED | OUTPATIENT
Start: 2024-04-15

## 2024-04-15 RX ORDER — CLONIDINE HYDROCHLORIDE 0.1 MG/1
0.1 TABLET ORAL EVERY 12 HOURS SCHEDULED
Qty: 180 TABLET | Refills: 1 | Status: SHIPPED | OUTPATIENT
Start: 2024-04-15 | End: 2024-07-14

## 2024-04-16 NOTE — PSYCH
MEDICATION MANAGEMENT NOTE        [facility] - PSYCHIATRIC ASSOCIATES      Name and Date of Birth:  Amish Pulido 83 y.o. 1940 MRN: [id number]    Date of Visit: April 16, 2024    Reason for Visit:   Chief Complaint   Patient presents with    Follow-up       SUBJECTIVE:    Amish is seen today for a follow up for Generalized Anxiety Disorder and insomnia. He continues to do relatively well since the last visit. He reports that on and off anxiety symptoms have been fairly stable, reports That morning fatigue improved .  Patient is currently on Clonidine 0.1-0.2 mg at bedtime and Cymbalta 120 mg for last 5 month.  He reported that clonidine has been working well for his insomnia.  He reports improvement of his energy after taking Cymbalta in the morning instead of afternoon time    He denies any suicidal ideation, intent or plan at present; denies any homicidal ideation, intent or plan at present.    He denies any auditory hallucinations, denies any visual hallucinations, denies any overt delusions.    He reports some tiredness.      HPI ROS Appetite Changes and Sleep:     He reports adequate number of sleep hours, normal appetite, normal energy level      Review Of Systems:      Constitutional negative   ENT sinus pain   Cardiovascular negative   Respiratory negative   Gastrointestinal negative   Genitourinary negative   Musculoskeletal negative   Integumentary negative   Neurological headache   Endocrine negative   Other Symptoms none, all other systems are negative         Past Medical History:    Past Medical History:   Diagnosis Date    Acute non-recurrent maxillary sinusitis 01/24/2022    Formatting of this note might be different from the original.   He is currently on Augmentin 875 twice daily he has 2 more days to go and he is feeling better.      Last Assessment & Plan:    Formatting of this note might be different from the original.   He will complete his Augmentin and I did  discuss with him regarding taking a probiotic anytime he is on an antibiotic to help him with preventin    Allergic rhinitis 01/28/2008    Formatting of this note might be different from the original.   On flutiicasone         Last Assessment & Plan:    Formatting of this note might be different from the original.   Continue same medication    Dyslipidemia, goal to be determined 01/28/2008    GERD (gastroesophageal reflux disease)     Glaucoma     Hernia, inguinal, right 01/12/2021    High cholesterol     Insomnia 04/10/2017    Formatting of this note might be different from the original.   Uses clonazepam prn      Last Assessment & Plan:    Formatting of this note might be different from the original.   Continue same medication    Liver cyst 03/22/2021    Formatting of this note might be different from the original.   Had an ultrasound done in January 2021      Last Assessment & Plan:    Formatting of this note might be different from the original.   Will be getting repeat ultrasound in July and following up with gastroenterology.    Lyme disease 06/29/2021    Last Assessment & Plan:    Formatting of this note might be different from the original.   Given prescription for doxycycline 100 mg twice a day for 21 days.  We will start off today.  He was also instructed to stop his Augmentin.  He relates that there is an plenty of fluids.  He was instructed to stay active and he does not need to overdo it.  I reviewed the labs with patient.  Answered all ques        Past Surgical History:   Procedure Laterality Date    EYE SURGERY      HERNIA REPAIR       Allergies   Allergen Reactions    Cat Hair Extract Cough    Horse-Derived Products Cough    Pollen Extract Cough       Substance Abuse History:    Social History     Substance and Sexual Activity   Alcohol Use Not Currently     Social History     Substance and Sexual Activity   Drug Use Never       Social History:    Social History     Socioeconomic History    Marital  status: Unknown     Spouse name: Not on file    Number of children: Not on file    Years of education: Not on file    Highest education level: Not on file   Occupational History    Not on file   Tobacco Use    Smoking status: Never    Smokeless tobacco: Never   Vaping Use    Vaping status: Never Used   Substance and Sexual Activity    Alcohol use: Not Currently    Drug use: Never    Sexual activity: Not Currently   Other Topics Concern    Not on file   Social History Narrative    Not on file     Social Determinants of Health     Financial Resource Strain: Low Risk  (12/10/2022)    Received from [facility]    Overall Financial Resource Strain (CARDIA)     Difficulty of Paying Living Expenses: Not hard at all   Recent Concern: Financial Resource Strain - Medium Risk (10/19/2022)    Overall Financial Resource Strain (CARDIA)     Difficulty of Paying Living Expenses: Somewhat hard   Food Insecurity: No Food Insecurity (12/10/2022)    Received from [facility]    Hunger Vital Sign     Worried About Running Out of Food in the Last Year: Never true     Ran Out of Food in the Last Year: Never true   Transportation Needs: No Transportation Needs (12/10/2022)    Received from [facility]    PRAPARE - Transportation     Lack of Transportation (Medical): No     Lack of Transportation (Non-Medical): No   Physical Activity: Sufficiently Active (12/10/2022)    Received from [facility]    Exercise Vital Sign     Days of Exercise per Week: 5 days     Minutes of Exercise per Session: 30 min   Stress: No Stress Concern Present (12/10/2022)    Received from [facility]    [place] [employer] - Occupational Stress Questionnaire     Feeling of Stress : Only a little   Social Connections: Moderately Isolated (12/10/2022)    Received from [facility]    Social Connection and Isolation Panel [NHANES]     Frequency of  Communication with Friends and Family: More than three times a week     Frequency of Social Gatherings with Friends and Family: Once a week     Attends Pentec[employer]al Services: Never     Active Member of Clubs or Organizations: No     Attends Club or Organization Meetings: Patient declined     Marital Status:    Intimate Partner Violence: Not At Risk (12/10/2022)    Received from [facility]    Humiliation, Afraid, Rape, and Kick questionnaire     Fear of Current or Ex-Partner: No     Emotionally Abused: No     Physically Abused: No     Sexually Abused: No   Housing Stability: Low Risk  (12/10/2022)    Received from [facility]    Housing Stability Vital Sign     Unable to Pay for Housing in the Last Year: No     Number of Places Lived in the Last Year: 1     Unstable Housing in the Last Year: No       Family Psychiatric History:     Family History   Problem Relation Age of Onset    Completed Suicide  Mother     Cancer Father        History Review: The following portions of the patient's history were reviewed and updated as appropriate: allergies, current medications, past family history, past medical history, past social history, past surgical history and problem list.         OBJECTIVE:     Vital signs in last 24 hours:    Vitals:    04/15/24 1042   BP: 146/76   BP Location: Right arm   Patient Position: Sitting   Cuff Size: Standard   Pulse: (!) 54         Mental Status Evaluation:    Appearance age appropriate, casually dressed   Behavior cooperative, calm   Speech normal rate, normal volume, normal pitch   Mood improved, anxious   Affect normal range and intensity, appropriate   Thought Processes organized, goal directed   Associations intact associations   Thought Content no overt delusions   Perceptual Disturbances: no auditory hallucinations, no visual hallucinations   Abnormal Thoughts  Risk Potential Suicidal ideation - None  Homicidal ideation - None  Potential for  aggression - No   Orientation oriented to person, place, time/date and situation   Memory recent and remote memory grossly intact   Consciousness alert and awake   Attention Span Concentration Span attention span and concentration are age appropriate   Intellect appears to be of average intelligence   Insight intact   Judgement intact   Muscle Strength and  Gait normal muscle strength and normal muscle tone, normal gait and normal balance   Motor activity no abnormal movements   Language no difficulty naming common objects, no difficulty repeating a phrase, no difficulty writing a sentence   Fund of Knowledge adequate knowledge of current events  adequate fund of knowledge regarding past history  adequate fund of knowledge regarding vocabulary    Pain none   Pain Scale 0       Laboratory Results: I have personally reviewed all pertinent laboratory/tests results    Most Recent Labs:   Collected 4/11/2023  8:32 AM          Component Ref Range & Units 2 mo ago   Hemoglobin A1C <5.7 % 5.8 High     Comment: Reference Range   Non-diabetic                     <5.7   Pre-diabetic                     5.7-6.4   Diabetic                         >=6.5   ADA target for diabetic control  <=7   eAG, EST AVG Glucose mg/dL 120         View Full Report        Specimen Collected: 04/11/23  8:32 AM    Performed By: [facility] CORE LAB (HNL1) Last Resulted: 04/11/23  3:14 PM   Received From: [facility]  Result Received: 06/12/23 10:24 AM       Received Information                TSH, 3RD GENERATION  Order: [id number]   suggestion  Result Information displayed in this report will not trend and may not trigger automated decision support.      Ref Range & Units 2 mo ago   Thyroid Stimulating Hormone 0.45 - 5.33 uIU/mL 2.04      Specimen Collected: 04/11/23  8:32 AM    Performed by: [facility] CORE LAB (HNL1) Last Resulted: 04/11/23  2:16 PM   Received From: [facility]  Result Received: 06/12/23 10:24 AM    Received  Information  LIPID PANEL  Order: [id number]   suggestion  Result Information displayed in this report will not trend and may not trigger automated decision support.      Ref Range & Units 2 mo ago   Cholesterol <200 mg/dL 167    Triglyceride <150 mg/dL 149    Cholesterol, HDL, Direct 23 - 92 mg/dL 52    Cholesterol, Non-HDL <160 mg/dL 115    Cholesterol, LDL, Calculated <130 mg/dL 85    Comment: LDL Cholesterol was calculated using the Friedewald equation. Direct measurement of LDL is not indicated for this patient based on [facility]'s analytical algorithm for measurement of LDL Cholesterol.   CHOL/HDL Ratio  3.2      Specimen Collected: 04/11/23  8:32 AM    Performed by: [facility] CORE LAB (HNL1) Last Resulted: 04/11/23  2:20 PM   Received From: [facility]  Result Received: 06/12/23 10:24 AM    Received Information   Contains abnormal data COMPREHENSIVE METABOLIC PANEL  Order: [id number]   suggestion  Result Information displayed in this report will not trend and may not trigger automated decision support.      Ref Range & Units 2 mo ago   Glucose 65 - 99 mg/dL 121 High     BUN 7 - 28 mg/dL 26    Creatinine 0.53 - 1.30 mg/dL 0.95    Sodium 135 - 145 mmol/L 141    Potassium 3.5 - 5.2 mmol/L 4.4    Chloride 100 - 109 mmol/L 102    Carbon Dioxide 21 - 31 mmol/L 32 High     Calcium 8.5 - 10.1 mg/dL 9.1    Alkaline Phosphatase 35 - 120 U/L 56    Albumin 3.5 - 5.7 g/dL 3.8    Bilirubin, Total 0.2 - 1.0 mg/dL 0.7    Comment: Eltrombopag and its metabolites may interfere with this assay causing erroneously high patient results.   Protein, Total 6.3 - 8.3 g/dL 6.4    AST <41 U/L 18    ALT <56 U/L 25    Anion Gap 3 - 11 7    eGFRcr >59 80    eGFRcr Comment  Interpretive information: calculated GFR    Comment:                                            Units: mL/min per 1.73 square meters   Reported eGFR is based on the CKD-EPI 2021 creatinine equation that does not use a race coefficient and conforms to the NKF-ASN Task  Force Recommendations.   Note: Calculated GFR may not be an accurate indicator of renal function if the patient's renal function is not in a steady state.                                              GFR Categories in Chronic Kidney Disease (CKD):   GFR        GFR (mL/min/1.73   Category:  square meters):  Interpretation:   G1         90 or greater    Normal or high*   G2         60 - 89          Mild decrease*   G3a        45 - 59          Mild to moderate decrease   G3b        30 - 44          Moderate to severe decrease   G4         15 - 29          Severe decrease   G5         14 or less       Kidney failure                                              *In the absence of evidence of kidney damage, neither GFR category G1 or G2 fulfill the criteria for CKD. Kidney Int Suppl 2013, 3: 1-1 50.     Specimen Collected: 04/11/23  8:32 AM    Performed by: [facility] CORE LAB (HNL1) Last Resulted: 04/11/23  2:20 PM   Received From: [facility]  Result Received: 06/12/23 10:24 AM    Received Information  FASTING STATUS  Order: [id number]   suggestion  Information displayed in this report may not trend or trigger automated decision support.     Component 2 mo ago   Fasting Status:  Fasting      Specimen Collected: 04/11/23  8:32 AM    Performed by: [facility] LAB MEDICINE Last Resulted: 04/11/23  1:11 PM   Received From: [facility]  Result Received: 06/12/23 10:24 AM    Received Information    Suicide/Homicide Risk Assessment:    Risk of Harm to Self:  The following ratings are based on assessment at the time of the interview  Based on today's assessment, Amish presents the following risk of harm to self: minimal    Risk of Harm to Others:  The following ratings are based on assessment at the time of the interview  Based on today's assessment, Amish presents the following risk of harm to others: minimal    The following interventions are recommended: no intervention changes needed    Assessment/Plan:   Amish Pulido is 83 years old adult male patient was seen today for her scheduled follow-up for management of his insomnia, anxiety and depression. Anxiety and depression were partially responding to Lexapro so we added Wellbutrin however patient is not tolerating Wellbutrin and does not feel it is helpful and prefers to take higher dose of Lexapro.  We discontinued Wellbutrin and maximize Lexapro to 20 mg. we tried for his insomnia ramelteon but failed and had the best results with clonidine.  In the previous visits we decided to switch him from Lexapro to Cymbalta to target fatigue associated with depression. We Tapered Lexapro off and currently is taking the maximum dose of Cymbalta and no more changes is required today     Diagnoses and all orders for this visit:    CHARISMA (generalized anxiety disorder)  -     cloNIDine (CATAPRES) 0.1 mg tablet; Take 1 tablet (0.1 mg total) by mouth every 12 (twelve) hours Hold medication if blood pressure lower than 90/60  -     DULoxetine (CYMBALTA) 60 mg delayed release capsule; Take 2 capsules (120 mg total) by mouth daily    Insomnia, unspecified type  -     cloNIDine (CATAPRES) 0.1 mg tablet; Take 1 tablet (0.1 mg total) by mouth every 12 (twelve) hours Hold medication if blood pressure lower than 90/60              Treatment Recommendations/Precautions:      Medication changes: I have discontinued Amish Pulido's azithromycin. I am also having him maintain his fluticasone, ibuprofen, timolol, atorvastatin, Prolensa, Aspirin-Acetaminophen-Caffeine (EXCEDRIN MIGRAINE PO), albuterol, Flovent HFA, Bromfenac Sodium (Once-Daily), omeprazole, ketoconazole, cloNIDine, and DULoxetine.    Current Outpatient Medications:     albuterol (PROVENTIL HFA,VENTOLIN HFA) 90 mcg/act inhaler, , Disp: , Rfl:     Aspirin-Acetaminophen-Caffeine (EXCEDRIN MIGRAINE PO), Take 1 tablet by mouth daily as needed, Disp: , Rfl:     atorvastatin (LIPITOR) 40 mg tablet, Take 1 tablet by mouth daily, Disp: , Rfl:      Bromfenac Sodium, Once-Daily, 0.09 % SOLN, INSTILL 1 DROP INTO RIGHT EYE TWICE A DAY, Disp: , Rfl:     cloNIDine (CATAPRES) 0.1 mg tablet, Take 1 tablet (0.1 mg total) by mouth every 12 (twelve) hours Hold medication if blood pressure lower than 90/60, Disp: 180 tablet, Rfl: 1    DULoxetine (CYMBALTA) 60 mg delayed release capsule, Take 2 capsules (120 mg total) by mouth daily, Disp: 180 capsule, Rfl: 1    Flovent  MCG/ACT inhaler, Inhale 2 puffs 2 (two) times a day, Disp: , Rfl:     ketoconazole (NIZORAL) 2 % cream, Apply topically daily, Disp: , Rfl:     omeprazole (PriLOSEC) 40 MG capsule, Take 40 mg by mouth daily, Disp: , Rfl:     Prolensa 0.07 % SOLN, , Disp: , Rfl:     timolol (TIMOPTIC) 0.5 % ophthalmic solution, INSTILL ONE DROP INTO BOTH EYES TWICE DAILY, Disp: , Rfl:     fluticasone (FLONASE) 50 mcg/act nasal spray, 2 sprays into each nostril daily, Disp: , Rfl:     ibuprofen (MOTRIN) 600 mg tablet, Take 600 mg by mouth every 6 (six) hours as needed, Disp: , Rfl:   Amish has a current medication list which includes the following prescription(s): albuterol, aspirin-acetaminophen-caffeine, atorvastatin, bromfenac sodium (once-daily), clonidine, duloxetine, flovent hfa, ketoconazole, omeprazole, prolensa, timolol, fluticasone, and ibuprofen.  Aware of 24 hour and weekend coverage for urgent situations accessed by calling [facility] main practice number    Medications Risks/Benefits      Risks, Benefits And Possible Side Effects Of Medications:    Risks, benefits, and possible side effects of medications explained to Amish and he verbalizes understanding and agreement for treatment.    Controlled Medication Discussion:     Amish has been filling controlled prescriptions on time as prescribed according to [place] Prescription Drug Monitoring Program    Psychotherapy Provided:     Individual psychotherapy provided: Yes  Counseling was provided during the session today for 16  minutes.  Medications, treatment progress and treatment plan reviewed with Amish.  Medication changes discussed with Amish.  Medication education provided to Amish.  Importance of medication and treatment compliance reviewed with Amish.  Educated on importance of medication and treatment compliance.  Importance of follow up with family physician for medical issues reviewed with Amish.  Discussed with Amish acceptance of mental illness diagnosis and need for ongoing psychiatric treatment.  Importance of follow up for substance abuse issues discussed with Amish.  Supportive therapy provided.   Cognitive therapy was utilized during the session.  Reassurance and supportive therapy provided.   Reoriented to reality and reassured.      Treatment Plan:    Completed and signed during the session: Not applicable - Treatment Plan not due at this session    Note Share:    This note was not shared with the patient due to reasonable likelihood of causing patient harm    Visit start and stop times:    Start Time:  10:30 AM  Stop Time:  10:55 AM    I spent 25 minutes directly with the patient during this visit    Malinda Mcmahon MD 04/16/24

## 2024-05-13 ENCOUNTER — DOCTOR'S OFFICE (OUTPATIENT)
Dept: URBAN - NONMETROPOLITAN AREA CLINIC 1 | Facility: CLINIC | Age: 84
Setting detail: OPHTHALMOLOGY
End: 2024-05-13
Payer: MEDICARE

## 2024-05-13 DIAGNOSIS — H35.81: ICD-10-CM

## 2024-05-13 DIAGNOSIS — H40.1121: ICD-10-CM

## 2024-05-13 DIAGNOSIS — H04.122: ICD-10-CM

## 2024-05-13 DIAGNOSIS — H35.371: ICD-10-CM

## 2024-05-13 DIAGNOSIS — H40.1113: ICD-10-CM

## 2024-05-13 DIAGNOSIS — H04.121: ICD-10-CM

## 2024-05-13 PROCEDURE — 83861 MICROFLUID ANALY TEARS: CPT | Mod: QW,LT | Performed by: OPHTHALMOLOGY

## 2024-05-13 PROCEDURE — 92134 CPTRZ OPH DX IMG PST SGM RTA: CPT | Performed by: OPHTHALMOLOGY

## 2024-05-13 PROCEDURE — 99213 OFFICE O/P EST LOW 20 MIN: CPT | Performed by: OPHTHALMOLOGY

## 2024-05-13 PROCEDURE — 83861 MICROFLUID ANALY TEARS: CPT | Mod: QW,RT | Performed by: OPHTHALMOLOGY

## 2024-05-13 ASSESSMENT — LID EXAM ASSESSMENTS
OS_BLEPHARITIS: LLL LUL
OD_BLEPHARITIS: RLL RUL

## 2024-05-13 ASSESSMENT — CONFRONTATIONAL VISUAL FIELD TEST (CVF)
OD_FINDINGS: FULL
OS_FINDINGS: FULL

## 2024-06-10 ENCOUNTER — DOCTOR'S OFFICE (OUTPATIENT)
Dept: URBAN - NONMETROPOLITAN AREA CLINIC 1 | Facility: CLINIC | Age: 84
Setting detail: OPHTHALMOLOGY
End: 2024-06-10
Payer: MEDICARE

## 2024-06-10 DIAGNOSIS — H35.81: ICD-10-CM

## 2024-06-10 PROCEDURE — 92235 FLUORESCEIN ANGRPH MLTIFRAME: CPT | Performed by: OPHTHALMOLOGY

## 2024-06-10 PROCEDURE — 99213 OFFICE O/P EST LOW 20 MIN: CPT | Performed by: OPHTHALMOLOGY

## 2024-06-10 ASSESSMENT — LID EXAM ASSESSMENTS
OS_BLEPHARITIS: LLL LUL
OD_BLEPHARITIS: RLL RUL

## 2024-06-10 ASSESSMENT — CONFRONTATIONAL VISUAL FIELD TEST (CVF)
OD_FINDINGS: FULL
OS_FINDINGS: FULL

## 2024-07-29 ENCOUNTER — DOCTOR'S OFFICE (OUTPATIENT)
Dept: URBAN - NONMETROPOLITAN AREA CLINIC 1 | Facility: CLINIC | Age: 84
Setting detail: OPHTHALMOLOGY
End: 2024-07-29
Payer: MEDICARE

## 2024-07-29 DIAGNOSIS — H40.1121: ICD-10-CM

## 2024-07-29 DIAGNOSIS — H40.1113: ICD-10-CM

## 2024-07-29 DIAGNOSIS — H04.121: ICD-10-CM

## 2024-07-29 DIAGNOSIS — H35.371: ICD-10-CM

## 2024-07-29 DIAGNOSIS — H35.81: ICD-10-CM

## 2024-07-29 DIAGNOSIS — H04.122: ICD-10-CM

## 2024-07-29 PROCEDURE — 83861 MICROFLUID ANALY TEARS: CPT | Mod: QW,LT | Performed by: OPHTHALMOLOGY

## 2024-07-29 PROCEDURE — 99214 OFFICE O/P EST MOD 30 MIN: CPT | Performed by: OPHTHALMOLOGY

## 2024-07-29 PROCEDURE — 83861 MICROFLUID ANALY TEARS: CPT | Mod: QW,RT | Performed by: OPHTHALMOLOGY

## 2024-07-29 PROCEDURE — 92134 CPTRZ OPH DX IMG PST SGM RTA: CPT | Performed by: OPHTHALMOLOGY

## 2024-07-29 ASSESSMENT — CONFRONTATIONAL VISUAL FIELD TEST (CVF)
OS_FINDINGS: FULL
OD_FINDINGS: FULL

## 2024-07-29 ASSESSMENT — LID EXAM ASSESSMENTS
OD_BLEPHARITIS: RLL RUL
OS_BLEPHARITIS: LLL LUL

## 2024-08-19 ENCOUNTER — DOCTOR'S OFFICE (OUTPATIENT)
Dept: URBAN - NONMETROPOLITAN AREA CLINIC 1 | Facility: CLINIC | Age: 84
Setting detail: OPHTHALMOLOGY
End: 2024-08-19
Payer: MEDICARE

## 2024-08-19 DIAGNOSIS — H04.121: ICD-10-CM

## 2024-08-19 DIAGNOSIS — H04.122: ICD-10-CM

## 2024-08-19 DIAGNOSIS — H35.81: ICD-10-CM

## 2024-08-19 DIAGNOSIS — H35.371: ICD-10-CM

## 2024-08-19 PROCEDURE — 99213 OFFICE O/P EST LOW 20 MIN: CPT | Performed by: OPHTHALMOLOGY

## 2024-08-19 ASSESSMENT — LID EXAM ASSESSMENTS
OS_BLEPHARITIS: LLL LUL
OD_BLEPHARITIS: RLL RUL

## 2024-09-16 ENCOUNTER — RX ONLY (RX ONLY)
Age: 84
End: 2024-09-16

## 2024-09-16 ENCOUNTER — DOCTOR'S OFFICE (OUTPATIENT)
Dept: URBAN - NONMETROPOLITAN AREA CLINIC 1 | Facility: CLINIC | Age: 84
Setting detail: OPHTHALMOLOGY
End: 2024-09-16
Payer: MEDICARE

## 2024-09-16 DIAGNOSIS — H35.371: ICD-10-CM

## 2024-09-16 DIAGNOSIS — H04.121: ICD-10-CM

## 2024-09-16 DIAGNOSIS — H35.81: ICD-10-CM

## 2024-09-16 DIAGNOSIS — H04.122: ICD-10-CM

## 2024-09-16 PROCEDURE — 92134 CPTRZ OPH DX IMG PST SGM RTA: CPT | Performed by: OPHTHALMOLOGY

## 2024-09-16 PROCEDURE — 99214 OFFICE O/P EST MOD 30 MIN: CPT | Performed by: OPHTHALMOLOGY

## 2024-09-16 ASSESSMENT — REFRACTION_CURRENTRX
OS_VPRISM_DIRECTION: PROGS
OD_SPHERE: -0.50
OS_ADD: +2.50
OD_AXIS: 111
OS_SPHERE: +0.50
OS_AXIS: 86
OS_CYLINDER: -1.50
OD_CYLINDER: -0.25
OD_OVR_VA: 20/
OD_ADD: +2.50
OS_OVR_VA: 20/
OD_VPRISM_DIRECTION: PROGS

## 2024-09-16 ASSESSMENT — REFRACTION_AUTOREFRACTION
OD_CYLINDER: 0.00
OS_CYLINDER: -1.00
OS_SPHERE: -0.25
OS_AXIS: 093
OD_SPHERE: -1.00
OD_AXIS: 180

## 2024-09-16 ASSESSMENT — REFRACTION_MANIFEST
OU_VA: 20/20
OS_SPHERE: +0.25
OD_CYLINDER: -0.25
OS_VA2: 20/20
OS_ADD: +2.50
OS_CYLINDER: -1.25
OS_VA1: 20/20
OD_AXIS: 115
OD_VA1: 20/30-2
OD_SPHERE: -1.00
OS_AXIS: 090
OD_VA2: 20/30-2
OD_ADD: +2.50

## 2024-09-16 ASSESSMENT — KERATOMETRY
OD_K2POWER_DIOPTERS: 46.00
OS_K1POWER_DIOPTERS: 45.75
OD_AXISANGLE_DEGREES: 011
OD_K1POWER_DIOPTERS: 45.75
OS_K2POWER_DIOPTERS: 46.00
OS_AXISANGLE_DEGREES: 120

## 2024-09-16 ASSESSMENT — VISUAL ACUITY
OD_BCVA: 20/25+2
OS_BCVA: 20/40

## 2024-09-16 ASSESSMENT — LID EXAM ASSESSMENTS
OD_BLEPHARITIS: RLL RUL
OS_BLEPHARITIS: LLL LUL

## 2024-09-16 ASSESSMENT — CONFRONTATIONAL VISUAL FIELD TEST (CVF)
OS_FINDINGS: FULL
OD_FINDINGS: FULL

## 2024-09-18 ENCOUNTER — TELEPHONE (OUTPATIENT)
Age: 84
End: 2024-09-18

## 2024-09-18 NOTE — TELEPHONE ENCOUNTER
Patient is calling regarding cancelling an appointment.    Date/Time: 10/7 @10:30    Reason: N/A    Patient was rescheduled: YES [x] NO []  If yes, when was Patient reschedule for: 10/21 @2:30    Patient requesting call back to reschedule: YES [] NO [x]

## 2024-10-07 ENCOUNTER — DOCTOR'S OFFICE (OUTPATIENT)
Dept: URBAN - NONMETROPOLITAN AREA CLINIC 1 | Facility: CLINIC | Age: 84
Setting detail: OPHTHALMOLOGY
End: 2024-10-07
Payer: MEDICARE

## 2024-10-07 DIAGNOSIS — H04.121: ICD-10-CM

## 2024-10-07 DIAGNOSIS — H35.371: ICD-10-CM

## 2024-10-07 DIAGNOSIS — H35.81: ICD-10-CM

## 2024-10-07 DIAGNOSIS — H04.122: ICD-10-CM

## 2024-10-07 PROCEDURE — 92235 FLUORESCEIN ANGRPH MLTIFRAME: CPT | Performed by: OPHTHALMOLOGY

## 2024-10-07 PROCEDURE — 99213 OFFICE O/P EST LOW 20 MIN: CPT | Performed by: OPHTHALMOLOGY

## 2024-10-07 PROCEDURE — 92250 FUNDUS PHOTOGRAPHY W/I&R: CPT | Performed by: OPHTHALMOLOGY

## 2024-10-07 ASSESSMENT — REFRACTION_MANIFEST
OD_VA2: 20/30-2
OS_VA1: 20/20
OD_SPHERE: -1.00
OS_AXIS: 090
OD_CYLINDER: -0.25
OD_ADD: +2.50
OS_SPHERE: +0.25
OD_AXIS: 115
OS_CYLINDER: -1.25
OU_VA: 20/20
OD_VA1: 20/30-2
OS_VA2: 20/20
OS_ADD: +2.50

## 2024-10-07 ASSESSMENT — REFRACTION_AUTOREFRACTION
OS_SPHERE: -0.25
OD_CYLINDER: 0.00
OD_SPHERE: -1.00
OS_AXIS: 093
OD_AXIS: 180
OS_CYLINDER: -1.00

## 2024-10-07 ASSESSMENT — REFRACTION_CURRENTRX
OD_ADD: +2.50
OS_VPRISM_DIRECTION: PROGS
OS_ADD: +2.50
OD_CYLINDER: -0.25
OD_VPRISM_DIRECTION: PROGS
OS_AXIS: 86
OD_AXIS: 111
OD_SPHERE: -0.50
OS_CYLINDER: -1.50
OS_SPHERE: +0.50
OS_OVR_VA: 20/
OD_OVR_VA: 20/

## 2024-10-07 ASSESSMENT — KERATOMETRY
OS_K2POWER_DIOPTERS: 46.00
OD_AXISANGLE_DEGREES: 011
OD_K2POWER_DIOPTERS: 46.00
OS_K1POWER_DIOPTERS: 45.75
OS_AXISANGLE_DEGREES: 120
OD_K1POWER_DIOPTERS: 45.75

## 2024-10-07 ASSESSMENT — LID EXAM ASSESSMENTS
OS_BLEPHARITIS: LLL LUL
OD_BLEPHARITIS: RLL RUL

## 2024-10-07 ASSESSMENT — CONFRONTATIONAL VISUAL FIELD TEST (CVF)
OD_FINDINGS: FULL
OS_FINDINGS: FULL

## 2024-10-07 ASSESSMENT — VISUAL ACUITY
OD_BCVA: 20/25+1
OS_BCVA: 20/40-2

## 2024-10-21 ENCOUNTER — OFFICE VISIT (OUTPATIENT)
Dept: PSYCHIATRY | Facility: CLINIC | Age: 84
End: 2024-10-21
Payer: MEDICARE

## 2024-10-21 VITALS — SYSTOLIC BLOOD PRESSURE: 125 MMHG | HEART RATE: 59 BPM | DIASTOLIC BLOOD PRESSURE: 68 MMHG

## 2024-10-21 DIAGNOSIS — F41.1 GAD (GENERALIZED ANXIETY DISORDER): ICD-10-CM

## 2024-10-21 DIAGNOSIS — G47.00 INSOMNIA, UNSPECIFIED TYPE: ICD-10-CM

## 2024-10-21 PROCEDURE — 99214 OFFICE O/P EST MOD 30 MIN: CPT | Performed by: STUDENT IN AN ORGANIZED HEALTH CARE EDUCATION/TRAINING PROGRAM

## 2024-10-21 PROCEDURE — 90833 PSYTX W PT W E/M 30 MIN: CPT | Performed by: STUDENT IN AN ORGANIZED HEALTH CARE EDUCATION/TRAINING PROGRAM

## 2024-10-21 RX ORDER — DULOXETIN HYDROCHLORIDE 60 MG/1
60 CAPSULE, DELAYED RELEASE ORAL 2 TIMES DAILY
Qty: 180 CAPSULE | Refills: 1 | Status: SHIPPED | OUTPATIENT
Start: 2024-10-21 | End: 2025-01-19

## 2024-10-21 RX ORDER — CLONIDINE HYDROCHLORIDE 0.1 MG/1
0.1 TABLET ORAL EVERY 12 HOURS SCHEDULED
Qty: 180 TABLET | Refills: 1 | Status: SHIPPED | OUTPATIENT
Start: 2024-10-21 | End: 2025-01-19

## 2024-10-21 NOTE — BH TREATMENT PLAN
TREATMENT PLAN (Medication Management Only)        [facility] - PSYCHIATRIC [facility]OCI[facility]    Name and Date of Birth:  Amish Pulido 83 y.o. 1940  Date of Treatment Plan: October 21, 2024  Diagnosis/Diagnoses:    1. CHARISMA (generalized anxiety disorder)    2. Insomnia, unspecified type      Strengths/Personal Resources for Self-Care: supportive family, supportive friends, taking medications as prescribed, ability to adapt to life changes, ability to communicate needs, ability to communicate well, ability to listen, ability to reason, ability to understand psychiatric illness, average or above intelligence, family ties, financial means, financial security, general fund of knowledge, good physical health, good understanding of illness, independence, motivation for treatment, ability to negotiate basic needs, Protestant affiliation, being resoureceful, self-reliance, sense of humor, special hobby/interest, stable employment, strong sung, well educated, willingness to work on problems, work skills.  Area/Areas of need (in own words): anxiety symptoms, depressive symptoms  1. Long Term Goal: maintain acceptable anxiety level.  Target Date:6 months - 4/21/2025  Person/Persons responsible for completion of goal: Amish  2.  Short Term Objective (s) - How will we reach this goal?:   A. Provider new recommended medication/dosage changes and/or continue medication(s): Medication changes: I have changed Amish Pulido's DULoxetine. I am also having him maintain his fluticasone, ibuprofen, timolol, atorvastatin, Prolensa, Aspirin-Acetaminophen-Caffeine (EXCEDRIN MIGRAINE PO), albuterol, Flovent HFA, Bromfenac Sodium (Once-Daily), omeprazole, ketoconazole, metFORMIN, and cloNIDine..  B. Avoid alcohol .  C. Attend medication management appointments regularly.  Target Date:6 months - 4/21/2025  Person/Persons Responsible for Completion of Goal: Amish  Progress Towards Goals: starting treatment  Treatment Modality:  medication management with psychotherapy every 6 months, medication education at every visit  Review due 180 days from date of this plan: 6 months - 4/21/2025  Expected length of service: maintenance  My Physician/PA/NP and I have developed this plan together and I agree to work on the goals and objectives. I understand the treatment goals that were developed for my treatment.

## 2024-10-21 NOTE — BH CRISIS PLAN
Client Name: Amish Pulido       Client YOB: 1940    Haylie Safety Plan      Creation Date: 10/21/24    Created By: Malinda Mcmahon MD       Step 1: Warning Signs:   Warning Signs   dizzness   over crowds            Step 2: Internal Coping Strategies:   Internal Coping Strategies   walking   get oudoors            Step 3: People and social settings that provide distraction:   Name Contact Information   wife    daughter     Places   Home           Step 4: People whom I can ask for help during a crisis:      Name Contact Information    Wife       Step 5: Professionals or agencies I can contact during a crisis:      Clinican/Agency Name Phone Emergency Contact    Dr. Mcmahon        [facility] Emergency Department Emergency Department Phone Emergency Department Address    [facility]          Crisis Phone Numbers:   Suicide Prevention Lifeline: Call or Text  458 Crisis Text Line: Text HOME to 027-823   Please note: Some [facility] do not have a separate number for Child/Adolescent specific crisis. If your county is not listed under Child/Adolescent, please call the adult number for your county      Adult Crisis Numbers: Child/Adolescent Crisis Numbers   [facility]: [phone] [facility]: [phone]   [facility]: [phone] [facility]: [phone]   [facility]: [phone] [place]: [phone]   [facility]: [phone] [place]: [phone]   Critical access hospital/[facility]: [phone]   [facility]: [phone]   [facility]: [phone]   [facility]: [phone] (daytime) [phone] (after hours, weekends, holidays)      Step 6: Making the environment safer (plan for lethal means safety):   Patient did not identify any lethal methods: Yes     Optional: What is most important to me and worth living for?   My family      Haylie Safety Plan. Maribell Bocanegra and Rj Ibarra. Used with  permission of the authors.

## 2024-10-21 NOTE — PSYCH
MEDICATION MANAGEMENT NOTE        [facility]      Name and Date of Birth:  Amish Pulido 83 y.o. 1940 MRN: [id number]    Date of Visit: October 21, 2024    Reason for Visit:   Chief Complaint   Patient presents with    Follow-up       SUBJECTIVE:    Amish is seen today for a follow up for Generalized Anxiety Disorder and insomnia. He continues to do relatively well since the last visit. He reports that on and off anxiety symptoms have been fairly stable, reports That morning fatigue improved .  Patient is currently on Clonidine 0.1-0.2 mg at bedtime and Cymbalta 120 mg.  He reported that clonidine has been working well for his insomnia.  He reports improvement of his energy after taking Cymbalta in the morning instead of afternoon time    He denies any suicidal ideation, intent or plan at present; denies any homicidal ideation, intent or plan at present.    He denies any auditory hallucinations, denies any visual hallucinations, denies any overt delusions.    He reports some tiredness.      HPI ROS Appetite Changes and Sleep:     He reports adequate number of sleep hours, normal appetite, normal energy level      Review Of Systems:      Constitutional negative   ENT sinus pain   Cardiovascular negative   Respiratory negative   Gastrointestinal negative   Genitourinary negative   Musculoskeletal negative   Integumentary negative   Neurological headache   Endocrine negative   Other Symptoms none, all other systems are negative         Past Medical History:    Past Medical History:   Diagnosis Date    Acute non-recurrent maxillary sinusitis 01/24/2022    Formatting of this note might be different from the original.   He is currently on Augmentin 875 twice daily he has 2 more days to go and he is feeling better.      Last Assessment & Plan:    Formatting of this note might be different from the original.   He will complete his Augmentin and I did discuss with him  regarding taking a probiotic anytime he is on an antibiotic to help him with preventin    Allergic rhinitis 01/28/2008    Formatting of this note might be different from the original.   On flutiicasone         Last Assessment & Plan:    Formatting of this note might be different from the original.   Continue same medication    Dyslipidemia, goal to be determined 01/28/2008    GERD (gastroesophageal reflux disease)     Glaucoma     Hernia, inguinal, right 01/12/2021    High cholesterol     Insomnia 04/10/2017    Formatting of this note might be different from the original.   Uses clonazepam prn      Last Assessment & Plan:    Formatting of this note might be different from the original.   Continue same medication    Liver cyst 03/22/2021    Formatting of this note might be different from the original.   Had an ultrasound done in January 2021      Last Assessment & Plan:    Formatting of this note might be different from the original.   Will be getting repeat ultrasound in July and following up with gastroenterology.    Lyme disease 06/29/2021    Last Assessment & Plan:    Formatting of this note might be different from the original.   Given prescription for doxycycline 100 mg twice a day for 21 days.  We will start off today.  He was also instructed to stop his Augmentin.  He relates that there is an plenty of fluids.  He was instructed to stay active and he does not need to overdo it.  I reviewed the labs with patient.  Answered all ques        Past Surgical History:   Procedure Laterality Date    EYE SURGERY      HERNIA REPAIR       Allergies   Allergen Reactions    Cat Hair Extract Cough    Horse-Derived Products Cough    Pollen Extract Cough       Substance Abuse History:    Social History     Substance and Sexual Activity   Alcohol Use Not Currently     Social History     Substance and Sexual Activity   Drug Use Never       Social History:    Social History     Socioeconomic History    Marital status:  /Civil Union     Spouse name: Not on file    Number of children: Not on file    Years of education: Not on file    Highest education level: Not on file   Occupational History    Not on file   Tobacco Use    Smoking status: Never    Smokeless tobacco: Never   Vaping Use    Vaping status: Never Used   Substance and Sexual Activity    Alcohol use: Not Currently    Drug use: Never    Sexual activity: Not Currently   Other Topics Concern    Not on file   Social History Narrative    Not on file     Social Determinants of Health     Financial Resource Strain: Low Risk  (12/10/2022)    [facility]    Overall Financial Resource Strain (CARDIA)     Difficulty of Paying Living Expenses: Not hard at all   Recent Concern: Financial Resource Strain - Medium Risk (10/19/2022)    Overall Financial Resource Strain (CARDIA)     Difficulty of Paying Living Expenses: Somewhat hard   Food Insecurity: No Food Insecurity (12/10/2022)    [facility]    Hunger Vital Sign     Worried About Running Out of Food in the Last Year: Never true     Ran Out of Food in the Last Year: Never true   Transportation Needs: No Transportation Needs (12/10/2022)    [facility]    PRAPARE - Transportation     Lack of Transportation (Medical): No     Lack of Transportation (Non-Medical): No   Physical Activity: Sufficiently Active (12/10/2022)    Received from [facility]    Exercise Vital Sign     Days of Exercise per Week: 5 days     Minutes of Exercise per Session: 30 min   Stress: No Stress Concern Present (12/10/2022)    Received from [facility], [facility]    [place] - Occupational Stress Questionnaire     Feeling of Stress : Only a little   Social Connections: Unknown (6/18/2024)     Received from [employer]     How often do you feel lonely or isolated from those around you? (Adult - for ages 18 years and over): Not on file   Intimate Partner Violence: Not At Risk (12/10/2022)    [facility]    Humiliation, Afraid, Rape, and Kick questionnaire     Fear of Current or Ex-Partner: No     Emotionally Abused: No     Physically Abused: No     Sexually Abused: No   Housing Stability: Not on file       Family Psychiatric History:     Family History   Problem Relation Age of Onset    Completed Suicide  Mother     Cancer Father        History Review: The following portions of the patient's history were reviewed and updated as appropriate: allergies, current medications, past family history, past medical history, past social history, past surgical history and problem list.         OBJECTIVE:     Vital signs in last 24 hours:    Vitals:    10/21/24 1206   BP: 125/68   BP Location: Right arm   Patient Position: Sitting   Cuff Size: Standard   Pulse: 59         Mental Status Evaluation:    Appearance age appropriate, casually dressed   Behavior cooperative, calm   Speech normal rate, normal volume, normal pitch   Mood improved, anxious   Affect normal range and intensity, appropriate   Thought Processes organized, goal directed   Associations intact associations   Thought Content no overt delusions   Perceptual Disturbances: no auditory hallucinations, no visual hallucinations   Abnormal Thoughts  Risk Potential Suicidal ideation - None  Homicidal ideation - None  Potential for aggression - No   Orientation oriented to person, place, time/date and situation   Memory recent and remote memory grossly intact   Consciousness alert and awake   Attention Span Concentration Span attention span and concentration are age appropriate   Intellect appears to be of average intelligence   Insight intact   Judgement intact   Muscle Strength  and  Gait normal muscle strength and normal muscle tone, normal gait and normal balance   Motor activity no abnormal movements   Language no difficulty naming common objects, no difficulty repeating a phrase, no difficulty writing a sentence   Fund of Knowledge adequate knowledge of current events  adequate fund of knowledge regarding past history  adequate fund of knowledge regarding vocabulary    Pain none   Pain Scale 0       Laboratory Results: I have personally reviewed all pertinent laboratory/tests results     Latest Reference Range & Units 06/17/24 09:23   Sodium 135 - 145 mmol/L 140 (E)   Potassium 3.5 - 5.2 mmol/L 4.5 (E)   Chloride 100 - 109 mmol/L 101 (E)   Carbon Dioxide 21 - 31 mmol/L 32 (H) (E)   ANION GAP 3 - 11  7 (E)   BUN 7 - 28 mg/dL 23 (E)   Creatinine 0.53 - 1.30 mg/dL 0.85 (E)   GLUCOSE 65 - 99 mg/dL 155 (H) (E)   Calcium 8.5 - 10.1 mg/dL 9.3 (E)   AST <41 U/L 12 (E)   ALT <56 U/L 11 (E)   ALK PHOS 35 - 120 U/L 62 (E)   Total Protein 6.3 - 8.3 g/dL 6.4 (E)   Albumin 3.5 - 5.7 g/dL 4.0 (E)   Total Bilirubin 0.2 - 1.0 mg/dL 0.8 (E)   GFR, Calculated >59  86 (E)   Hemoglobin A1C <5.7 % 6.6 (H) (E)   eAG, EST AVG Glucose mg/dL 143 (E)   eGFR Comment  Interpretive information: calculated GFR (E)   (H): Data is abnormally high  (E): External lab result  Suicide/Homicide Risk Assessment:    Risk of Harm to Self:  The following ratings are based on assessment at the time of the interview  Based on today's assessment, Amish presents the following risk of harm to self: minimal    Risk of Harm to Others:  The following ratings are based on assessment at the time of the interview  Based on today's assessment, Amish presents the following risk of harm to others: minimal    The following interventions are recommended: no intervention changes needed    Assessment/Plan:  Amish Pulido is 83 years old adult male patient was seen today for her scheduled follow-up for management of his insomnia, anxiety and  depression. Anxiety and depression were partially responding to Lexapro so we added Wellbutrin however patient is not tolerating Wellbutrin and does not feel it is helpful and prefers to take higher dose of Lexapro.  We discontinued Wellbutrin and maximize Lexapro to 20 mg. we tried for his insomnia ramelteon but failed and had the best results with clonidine.  In the previous visits we decided to switch him from Lexapro to Cymbalta to target fatigue associated with depression. We Tapered Lexapro off and currently is taking the maximum dose of Cymbalta and no more changes is required today     Diagnoses and all orders for this visit:    CHARISMA (generalized anxiety disorder)  -     cloNIDine (CATAPRES) 0.1 mg tablet; Take 1 tablet (0.1 mg total) by mouth every 12 (twelve) hours Hold medication if blood pressure lower than 90/60  -     DULoxetine (CYMBALTA) 60 mg delayed release capsule; Take 1 capsule (60 mg total) by mouth 2 (two) times a day    Insomnia, unspecified type  -     cloNIDine (CATAPRES) 0.1 mg tablet; Take 1 tablet (0.1 mg total) by mouth every 12 (twelve) hours Hold medication if blood pressure lower than 90/60    Other orders  -     metFORMIN (GLUCOPHAGE) 500 mg tablet; Take 500 mg by mouth daily with breakfast                Treatment Recommendations/Precautions:      Medication changes: I am having Amish Pulido maintain his fluticasone, ibuprofen, timolol, atorvastatin, Prolensa, Aspirin-Acetaminophen-Caffeine (EXCEDRIN MIGRAINE PO), albuterol, Flovent HFA, Bromfenac Sodium (Once-Daily), omeprazole, ketoconazole, cloNIDine, DULoxetine, and metFORMIN.    Current Outpatient Medications:     albuterol (PROVENTIL HFA,VENTOLIN HFA) 90 mcg/act inhaler, , Disp: , Rfl:     Aspirin-Acetaminophen-Caffeine (EXCEDRIN MIGRAINE PO), Take 1 tablet by mouth daily as needed, Disp: , Rfl:     atorvastatin (LIPITOR) 40 mg tablet, Take 1 tablet by mouth daily, Disp: , Rfl:     Bromfenac Sodium, Once-Daily, 0.09 % SOLN,  INSTILL 1 DROP INTO RIGHT EYE TWICE A DAY, Disp: , Rfl:     DULoxetine (CYMBALTA) 60 mg delayed release capsule, Take 2 capsules (120 mg total) by mouth daily, Disp: 180 capsule, Rfl: 1    Flovent  MCG/ACT inhaler, Inhale 2 puffs 2 (two) times a day, Disp: , Rfl:     ketoconazole (NIZORAL) 2 % cream, Apply topically daily, Disp: , Rfl:     metFORMIN (GLUCOPHAGE) 500 mg tablet, Take 500 mg by mouth daily with breakfast, Disp: , Rfl:     omeprazole (PriLOSEC) 40 MG capsule, Take 40 mg by mouth daily, Disp: , Rfl:     Prolensa 0.07 % SOLN, , Disp: , Rfl:     timolol (TIMOPTIC) 0.5 % ophthalmic solution, INSTILL ONE DROP INTO BOTH EYES TWICE DAILY, Disp: , Rfl:     cloNIDine (CATAPRES) 0.1 mg tablet, Take 1 tablet (0.1 mg total) by mouth every 12 (twelve) hours Hold medication if blood pressure lower than 90/60, Disp: 180 tablet, Rfl: 1    fluticasone (FLONASE) 50 mcg/act nasal spray, 2 sprays into each nostril daily, Disp: , Rfl:     ibuprofen (MOTRIN) 600 mg tablet, Take 600 mg by mouth every 6 (six) hours as needed, Disp: , Rfl:   Amish has a current medication list which includes the following prescription(s): albuterol, aspirin-acetaminophen-caffeine, atorvastatin, bromfenac sodium (once-daily), duloxetine, flovent hfa, ketoconazole, metformin, omeprazole, prolensa, timolol, clonidine, fluticasone, and ibuprofen.  Aware of 24 hour and weekend coverage for urgent situations accessed by calling [facility] main practice number    Medications Risks/Benefits      Risks, Benefits And Possible Side Effects Of Medications:    Risks, benefits, and possible side effects of medications explained to Amish and he verbalizes understanding and agreement for treatment.    Controlled Medication Discussion:     Amish has been filling controlled prescriptions on time as prescribed according to [place] Prescription Drug Monitoring Program    Psychotherapy Provided:     Individual psychotherapy provided:  Yes  Counseling was provided during the session today for 16 minutes.  Medications, treatment progress and treatment plan reviewed with Amish.  Medication changes discussed with Amish.  Medication education provided to Amish.  Importance of medication and treatment compliance reviewed with Amish.  Educated on importance of medication and treatment compliance.  Importance of follow up with family physician for medical issues reviewed with Amish.  Discussed with Amish acceptance of mental illness diagnosis and need for ongoing psychiatric treatment.  Importance of follow up for substance abuse issues discussed with Amish.  Supportive therapy provided.   Cognitive therapy was utilized during the session.  Reassurance and supportive therapy provided.   Reoriented to reality and reassured.      Treatment Plan:    Completed and signed during the session: Yes - with Amish    Note Share:    This note was not shared with the patient due to reasonable likelihood of causing patient harm    Visit start and stop times:    Start Time:  10:30 AM  Stop Time:  10:55 AM    I spent 25 minutes directly with the patient during this visit    Malinda Mcmahon MD 10/21/24

## 2024-12-04 ENCOUNTER — DOCTOR'S OFFICE (OUTPATIENT)
Dept: URBAN - NONMETROPOLITAN AREA CLINIC 1 | Facility: CLINIC | Age: 84
Setting detail: OPHTHALMOLOGY
End: 2024-12-04
Payer: COMMERCIAL

## 2024-12-04 DIAGNOSIS — H52.223: ICD-10-CM

## 2024-12-04 DIAGNOSIS — H52.13: ICD-10-CM

## 2024-12-04 DIAGNOSIS — H52.4: ICD-10-CM

## 2024-12-04 PROCEDURE — 92012 INTRM OPH EXAM EST PATIENT: CPT | Performed by: OPTOMETRIST

## 2024-12-04 ASSESSMENT — REFRACTION_MANIFEST
OS_VA2: 20/20
OS_ADD: +2.75
OS_SPHERE: +0.25
OD_VA2: 20/30-2
OD_CYLINDER: -0.25
OD_AXIS: 115
OS_VA1: 20/20
OU_VA: 20/20
OD_SPHERE: -1.00
OS_CYLINDER: -1.25
OD_VA1: 20/30-2
OS_AXIS: 090
OD_ADD: +2.75

## 2024-12-04 ASSESSMENT — REFRACTION_CURRENTRX
OD_SPHERE: -1.00
OS_SPHERE: +0.50
OS_OVR_VA: 20/
OS_VPRISM_DIRECTION: PROGS
OD_VPRISM_DIRECTION: PROGS
OD_ADD: +2.25
OS_AXIS: 095
OD_OVR_VA: 20/
OD_AXIS: 110
OD_CYLINDER: -0.25
OS_ADD: +2.25
OS_CYLINDER: -1.25

## 2024-12-04 ASSESSMENT — VISUAL ACUITY
OS_BCVA: 20/40-1
OD_BCVA: 20/25

## 2024-12-04 ASSESSMENT — REFRACTION_AUTOREFRACTION
OS_AXIS: 080
OS_SPHERE: 0.00
OD_SPHERE: -1.00
OD_AXIS: 114
OS_CYLINDER: -1.50
OD_CYLINDER: -0.50

## 2024-12-04 ASSESSMENT — LID EXAM ASSESSMENTS
OD_BLEPHARITIS: RLL RUL
OS_BLEPHARITIS: LLL LUL

## 2024-12-04 ASSESSMENT — CONFRONTATIONAL VISUAL FIELD TEST (CVF)
OS_FINDINGS: FULL
OD_FINDINGS: FULL

## 2024-12-05 ENCOUNTER — DOCTOR'S OFFICE (OUTPATIENT)
Dept: URBAN - NONMETROPOLITAN AREA CLINIC 1 | Facility: CLINIC | Age: 84
Setting detail: OPHTHALMOLOGY
End: 2024-12-05
Payer: MEDICARE

## 2024-12-05 DIAGNOSIS — H43.812: ICD-10-CM

## 2024-12-05 DIAGNOSIS — H35.81: ICD-10-CM

## 2024-12-05 DIAGNOSIS — H40.1121: ICD-10-CM

## 2024-12-05 DIAGNOSIS — H40.1113: ICD-10-CM

## 2024-12-05 DIAGNOSIS — H35.371: ICD-10-CM

## 2024-12-05 PROCEDURE — 92134 CPTRZ OPH DX IMG PST SGM RTA: CPT | Performed by: OPHTHALMOLOGY

## 2024-12-05 PROCEDURE — 99213 OFFICE O/P EST LOW 20 MIN: CPT | Performed by: OPHTHALMOLOGY

## 2024-12-05 ASSESSMENT — LID EXAM ASSESSMENTS
OD_BLEPHARITIS: RLL RUL
OS_BLEPHARITIS: LLL LUL

## 2024-12-05 ASSESSMENT — CONFRONTATIONAL VISUAL FIELD TEST (CVF)
OS_FINDINGS: FULL
OD_FINDINGS: FULL

## 2024-12-10 ASSESSMENT — REFRACTION_MANIFEST
OS_ADD: +2.75
OD_AXIS: 115
OD_VA1: 20/30-2
OS_VA2: 20/20
OD_ADD: +2.75
OS_AXIS: 090
OD_VA2: 20/30-2
OD_CYLINDER: -0.25
OS_SPHERE: +0.25
OU_VA: 20/20
OD_SPHERE: -1.00
OS_CYLINDER: -1.25
OS_VA1: 20/20

## 2024-12-10 ASSESSMENT — REFRACTION_CURRENTRX
OS_CYLINDER: -1.25
OD_OVR_VA: 20/
OD_CYLINDER: -0.25
OD_VPRISM_DIRECTION: PROGS
OD_AXIS: 110
OS_SPHERE: +0.50
OD_SPHERE: -1.00
OS_ADD: +2.25
OS_AXIS: 095
OS_OVR_VA: 20/
OD_ADD: +2.25
OS_VPRISM_DIRECTION: PROGS

## 2024-12-10 ASSESSMENT — KERATOMETRY
OD_K1POWER_DIOPTERS: 45.75
OS_K2POWER_DIOPTERS: 46.00
OD_AXISANGLE_DEGREES: 011
OS_AXISANGLE_DEGREES: 120
OS_K1POWER_DIOPTERS: 45.75
OD_K2POWER_DIOPTERS: 46.00

## 2024-12-10 ASSESSMENT — REFRACTION_AUTOREFRACTION
OS_SPHERE: 0.00
OD_CYLINDER: -0.50
OS_AXIS: 080
OS_CYLINDER: -1.50
OD_SPHERE: -1.00
OD_AXIS: 114

## 2024-12-10 ASSESSMENT — VISUAL ACUITY
OS_BCVA: 20/25-1
OD_BCVA: 20/20-2

## 2024-12-16 ENCOUNTER — DOCTOR'S OFFICE (OUTPATIENT)
Dept: URBAN - NONMETROPOLITAN AREA CLINIC 1 | Facility: CLINIC | Age: 84
Setting detail: OPHTHALMOLOGY
End: 2024-12-16
Payer: MEDICARE

## 2024-12-16 DIAGNOSIS — H40.1121: ICD-10-CM

## 2024-12-16 DIAGNOSIS — H40.1113: ICD-10-CM

## 2024-12-16 DIAGNOSIS — H35.81: ICD-10-CM

## 2024-12-16 DIAGNOSIS — H40.61X3: ICD-10-CM

## 2024-12-16 PROCEDURE — 99213 OFFICE O/P EST LOW 20 MIN: CPT | Performed by: OPHTHALMOLOGY

## 2024-12-16 ASSESSMENT — REFRACTION_CURRENTRX
OS_CYLINDER: -1.25
OD_SPHERE: -1.00
OD_VPRISM_DIRECTION: PROGS
OS_OVR_VA: 20/
OS_VPRISM_DIRECTION: PROGS
OS_ADD: +2.25
OS_SPHERE: +0.50
OD_CYLINDER: -0.25
OD_ADD: +2.25
OS_AXIS: 095
OD_AXIS: 110
OD_OVR_VA: 20/

## 2024-12-16 ASSESSMENT — REFRACTION_MANIFEST
OD_CYLINDER: -0.25
OS_SPHERE: +0.25
OU_VA: 20/20
OD_VA1: 20/30-2
OD_ADD: +2.75
OD_SPHERE: -1.00
OS_VA1: 20/20
OS_VA2: 20/20
OD_AXIS: 115
OS_AXIS: 090
OS_ADD: +2.75
OS_CYLINDER: -1.25
OD_VA2: 20/30-2

## 2024-12-16 ASSESSMENT — KERATOMETRY
OS_K1POWER_DIOPTERS: 45.75
OD_K1POWER_DIOPTERS: 45.75
OS_AXISANGLE_DEGREES: 120
OD_K2POWER_DIOPTERS: 46.00
OD_AXISANGLE_DEGREES: 011
OS_K2POWER_DIOPTERS: 46.00

## 2024-12-16 ASSESSMENT — VISUAL ACUITY
OS_BCVA: 20/40-2
OD_BCVA: 20/25-1

## 2024-12-16 ASSESSMENT — LID EXAM ASSESSMENTS
OS_BLEPHARITIS: LLL LUL
OD_BLEPHARITIS: RLL RUL

## 2024-12-16 ASSESSMENT — REFRACTION_AUTOREFRACTION
OD_AXIS: 114
OD_CYLINDER: -0.50
OS_SPHERE: 0.00
OD_SPHERE: -1.00
OS_AXIS: 080
OS_CYLINDER: -1.50

## 2025-01-06 ENCOUNTER — EVALUATION (OUTPATIENT)
Dept: PHYSICAL THERAPY | Facility: CLINIC | Age: 85
End: 2025-01-06
Payer: MEDICARE

## 2025-01-06 DIAGNOSIS — Z96.651 S/P TOTAL KNEE ARTHROPLASTY, RIGHT: Primary | ICD-10-CM

## 2025-01-06 PROCEDURE — 97535 SELF CARE MNGMENT TRAINING: CPT

## 2025-01-06 PROCEDURE — 97110 THERAPEUTIC EXERCISES: CPT

## 2025-01-06 PROCEDURE — 97161 PT EVAL LOW COMPLEX 20 MIN: CPT

## 2025-01-06 NOTE — PROGRESS NOTES
PT Evaluation     Today's date: 2025  Patient name: Amish Pulido  : 1940  MRN: [id number]  Referring provider: Daniel Malone MD  Dx:   Encounter Diagnosis     ICD-10-CM    1. S/P total knee arthroplasty, right  Z96.651           Start Time: 1000  Stop Time: 1050  Total time in clinic (min): 50 minutes    Assessment  Impairments: abnormal gait, abnormal or restricted ROM, activity intolerance, impaired physical strength, lacks appropriate home exercise program, pain with function, participation limitations and activity limitations    Assessment details: Patient is a 83 y/o male presenting to OP PT s/p R TKA. Upon evaluation, he presented with good ROM of R knee from 0-9-100 actively, good strength with knee flexion and extension, antalgic gait pattern but no reliance on AD, and moderate pain increase with activities. Patient was educated on healing timeline and expectations with progression, as well as, pain management strategies. Patient was able to complete full revolutions on bike for 10 minutes during session and had little pain increase during or after. Patient was given HEP for quad strength and ROM and will be seen by OP PT to return to PLOF.     Goals  STG to be met within 4 weeks:  1. Pt will increase R knee strength my 1/2 muscle grades on MMT to improve functional mobility.   2. Pt will improve R knee AROM to 0-5-120 to improve gait pattern and functional mobility.  3. Pt will report decrease in worst pain to 2/10 to reduce pain with function.   4. Pt will improve FOTO score by 10 points to improve functional mobility.  5. Pt will be independent in HEP to improve functional mobility.     LTG to be met within 8 weeks:  1. Pt will improve L knee strength to WNL to improve mobility and gait.  2. Pt will improve L knee AROM to WNL to improve mobility and gait.   3. Pt will reach FOTO score score by DC.  4. Pt will be I in HEP to return to PLOF.          Plan  Patient would benefit from: skilled  physical therapy and PT eval  Planned modality interventions: cryotherapy and thermotherapy: hydrocollator packs    Planned therapy interventions: balance/weight bearing training, flexibility, functional ROM exercises, home exercise program, joint mobilization, manual therapy, neuromuscular re-education, patient/caregiver education, self care, strengthening, stretching and therapeutic exercise    Frequency: 2x week  Duration in weeks: 8  Treatment plan discussed with: patient        Subjective Evaluation    History of Present Illness  Date of surgery: 1/3/2025  Mechanism of injury: surgery  Mechanism of injury: Patient reports he had a R TKA on 1/3/25 and stayed in the hospital for one night. He states he initially had a nerve block which helped with his pain significantly, however, since it has worn off, he is experiencing more pain especially in his inner thigh. Patient states he feels comfortable not using a WW or cane and can tolerate bearing full weight on his RLE. He states that prior to his surgery, he was fly fishing, hiking, walking, biking, etc. And would like to remain active.   Patient Goals  Patient goals for therapy: decreased pain, decreased edema, improved balance, increased motion, increased strength, independence with ADLs/IADLs and return to sport/leisure activities    Pain  Current pain rating: 3  At best pain ratin  At worst pain ratin  Quality: dull ache  Relieving factors: medications and ice  Aggravating factors: stair climbing, walking and standing  Progression: improved    Treatments  Current treatment: physical therapy        Objective     Palpation     Right   Tenderness of the distal semimembranosus, distal semitendinosus, rectus femoris and vastus medialis.     Tenderness     Right Knee   Tenderness in the patellar tendon and quadriceps tendon.     Active Range of Motion     Right Knee   Flexion: 100 degrees with pain  Extension: 9 degrees with pain    Passive Range of Motion      Right Knee   Flexion: 105 degrees with pain  Extension: 8 degrees with pain    Patellar Static Positioning   Right Knee: WFL    Strength/Myotome Testing     Right Knee   Flexion: 4  Extension: 4  Quadriceps contraction: fair    Swelling     Left Knee Girth Measurement (cm)   Joint line: 37.5 cm    Right Knee Girth Measurement (cm)   Joint line: 43.5 cm      Flowsheet Rows      Flowsheet Row Most Recent Value   PT/OT G-Codes    Current Score 43   Projected Score 70               Precautions: PMH R TKA, HTN, Irregular heartbeat  POC Expiration:       Manuals 1/6       R kne flex/ext mobs        R HS, piriformis, hip abd, and gastroc stretching                        Neuro Re-Ed        BOSU SLR        BOSU Squats        Step ups front and lateral        Step up and over        HR on step        Front and lateral lunges                Ther Ex        SRC for ROM and strength 10' L1       Leg press        Bridge c hip ABD        Clamshells         Supine and SL SLR        Prone hip /                HEP education and instruction 15'       Ther Activity                        Gait Training                        Modalities        CP PRN

## 2025-01-06 NOTE — LETTER
2025    Daniel Malone MD  [address]  [address]  [facility] [address]    Patient: Amish Pulido   YOB: 1940   Date of Visit: 2025     Encounter Diagnosis     ICD-10-CM    1. S/P total knee arthroplasty, right  Z96.651           Dear Dr. Malone:    Thank you for your recent referral of Amish Pulido. Please review the attached evaluation summary from Amish's recent visit.     Please verify that you agree with the plan of care by signing the attached order.     If you have any questions or concerns, please do not hesitate to call.     I sincerely appreciate the opportunity to share in the care of one of your patients and hope to have another opportunity to work with you in the near future.       Sincerely,    Jazmine Sibley, PT      Referring Provider:      I certify that I have read the below Plan of Care and certify the need for these services furnished under this plan of treatment while under my care.                    Daniel Malone MD  [address]  [address]  [facility] [address]  Via Fax: [phone]          PT Evaluation     Today's date: 2025  Patient name: Amish Pulido  : 1940  MRN: [id number]  Referring provider: Daniel Malone MD  Dx:   Encounter Diagnosis     ICD-10-CM    1. S/P total knee arthroplasty, right  Z96.651           Start Time: 1000  Stop Time: 1050  Total time in clinic (min): 50 minutes    Assessment  Impairments: abnormal gait, abnormal or restricted ROM, activity intolerance, impaired physical strength, lacks appropriate home exercise program, pain with function, participation limitations and activity limitations    Assessment details: Patient is a 83 y/o male presenting to OP PT s/p R TKA. Upon evaluation, he presented with good ROM of R knee from 0-9-100 actively, good strength with knee flexion and extension, antalgic gait pattern but no reliance on AD, and moderate pain increase with activities. Patient was educated on healing  timeline and expectations with progression, as well as, pain management strategies. Patient was able to complete full revolutions on bike for 10 minutes during session and had little pain increase during or after. Patient was given HEP for quad strength and ROM and will be seen by OP PT to return to PLOF.     Goals  STG to be met within 4 weeks:  1. Pt will increase R knee strength my 1/2 muscle grades on MMT to improve functional mobility.   2. Pt will improve R knee AROM to 0-5-120 to improve gait pattern and functional mobility.  3. Pt will report decrease in worst pain to 2/10 to reduce pain with function.   4. Pt will improve FOTO score by 10 points to improve functional mobility.  5. Pt will be independent in HEP to improve functional mobility.     LTG to be met within 8 weeks:  1. Pt will improve L knee strength to WNL to improve mobility and gait.  2. Pt will improve L knee AROM to WNL to improve mobility and gait.   3. Pt will reach FOTO score score by DC.  4. Pt will be I in HEP to return to PLOF.          Plan  Patient would benefit from: skilled physical therapy and PT eval  Planned modality interventions: cryotherapy and thermotherapy: hydrocollator packs    Planned therapy interventions: balance/weight bearing training, flexibility, functional ROM exercises, home exercise program, joint mobilization, manual therapy, neuromuscular re-education, patient/caregiver education, self care, strengthening, stretching and therapeutic exercise    Frequency: 2x week  Duration in weeks: 8  Treatment plan discussed with: patient        Subjective Evaluation    History of Present Illness  Date of surgery: 1/3/2025  Mechanism of injury: surgery  Mechanism of injury: Patient reports he had a R TKA on 1/3/25 and stayed in the hospital for one night. He states he initially had a nerve block which helped with his pain significantly, however, since it has worn off, he is experiencing more pain especially in his inner  thigh. Patient states he feels comfortable not using a WW or cane and can tolerate bearing full weight on his RLE. He states that prior to his surgery, he was fly fishing, hiking, walking, biking, etc. And would like to remain active.   Patient Goals  Patient goals for therapy: decreased pain, decreased edema, improved balance, increased motion, increased strength, independence with ADLs/IADLs and return to sport/leisure activities    Pain  Current pain rating: 3  At best pain ratin  At worst pain ratin  Quality: dull ache  Relieving factors: medications and ice  Aggravating factors: stair climbing, walking and standing  Progression: improved    Treatments  Current treatment: physical therapy        Objective     Palpation     Right   Tenderness of the distal semimembranosus, distal semitendinosus, rectus femoris and vastus medialis.     Tenderness     Right Knee   Tenderness in the patellar tendon and quadriceps tendon.     Active Range of Motion     Right Knee   Flexion: 100 degrees with pain  Extension: 9 degrees with pain    Passive Range of Motion     Right Knee   Flexion: 105 degrees with pain  Extension: 8 degrees with pain    Patellar Static Positioning   Right Knee: WFL    Strength/Myotome Testing     Right Knee   Flexion: 4  Extension: 4  Quadriceps contraction: fair    Swelling     Left Knee Girth Measurement (cm)   Joint line: 37.5 cm    Right Knee Girth Measurement (cm)   Joint line: 43.5 cm      Flowsheet Rows      Flowsheet Row Most Recent Value   PT/OT G-Codes    Current Score 43   Projected Score 70               Precautions: PMH R TKA, HTN, Irregular heartbeat  POC Expiration:       Manuals 1/6       R kne flex/ext mobs        R HS, piriformis, hip abd, and gastroc stretching                        Neuro Re-Ed        BOSU SLR        BOSU Squats        Step ups front and lateral        Step up and over        HR on step        Front and lateral lunges                Ther Ex        SRC for  ROM and strength 10' L1       Leg press        Bridge c hip ABD        Clamshells         Supine and SL SLR        Prone hip /                HEP education and instruction 15'       Ther Activity                        Gait Training                        Modalities        CP PRN

## 2025-01-09 ENCOUNTER — OFFICE VISIT (OUTPATIENT)
Dept: PHYSICAL THERAPY | Facility: CLINIC | Age: 85
End: 2025-01-09
Payer: MEDICARE

## 2025-01-09 DIAGNOSIS — Z96.651 S/P TOTAL KNEE ARTHROPLASTY, RIGHT: Primary | ICD-10-CM

## 2025-01-09 PROCEDURE — 97140 MANUAL THERAPY 1/> REGIONS: CPT

## 2025-01-09 PROCEDURE — 97110 THERAPEUTIC EXERCISES: CPT

## 2025-01-09 NOTE — PROGRESS NOTES
"Daily Note     Today's date: 2025  Patient name: Amish Pulido  : 1940  MRN: [id number]  Referring provider: Daniel Malone MD  Dx:   Encounter Diagnosis     ICD-10-CM    1. S/P total knee arthroplasty, right  Z96.651           Start Time: 955  Stop Time: 1100  Total time in clinic (min): 65 minutes    Subjective: Patient reports he is doing well but is having a little more pain and stiffness today. He states he had to stop taking his pain medication yesterday because of the fogginess in his head.       Objective: See treatment diary below      Assessment: Tolerated treatment well. Patient is demonstrating great ROM with ability to complete full revolution around seated recumbent bike and obtaining almost full extension post mobilizations. Patient was educated in quad strengthening with emphasis on terminal knee extension to be completed at home with patient verbalizing understanding. Patient demonstrated fatigue post treatment, exhibited good technique with therapeutic exercises, and would benefit from continued PT to improve R knee mobility and strength to improve gait pattern and return to PLOF.       Plan: Continue per plan of care.      Precautions: PMH R TKA, HTN, Irregular heartbeat  POC Expiration:       Manuals       R kne flex/ext mobs  10' c patellar mobs      R HS, piriformis, hip abd, and gastroc stretching  10'                      Neuro Re-Ed        BOSU SLR        BOSU Squats        Step ups front and lateral        Step up and over        HR on step        Front and lateral lunges        Terminal knee extension  10x5\" hold GTB      Ther Ex        SRC for ROM and strength 10' L1 10' L1      Leg press        Bridge c hip ABD  2x10 GTB      Clamshells   2x10 GTB R only       Supine and SL SLR  2x10 ea R only      Prone hip /                HEP education and instruction 15'       Ther Activity                        Gait Training                        Modalities        CP PRN            "

## 2025-01-13 ENCOUNTER — DOCTOR'S OFFICE (OUTPATIENT)
Dept: URBAN - NONMETROPOLITAN AREA CLINIC 1 | Facility: CLINIC | Age: 85
Setting detail: OPHTHALMOLOGY
End: 2025-01-13
Payer: MEDICARE

## 2025-01-13 DIAGNOSIS — H35.371: ICD-10-CM

## 2025-01-13 DIAGNOSIS — H53.121: ICD-10-CM

## 2025-01-13 DIAGNOSIS — H35.81: ICD-10-CM

## 2025-01-13 DIAGNOSIS — H40.1113: ICD-10-CM

## 2025-01-13 PROCEDURE — 92134 CPTRZ OPH DX IMG PST SGM RTA: CPT | Performed by: OPHTHALMOLOGY

## 2025-01-13 PROCEDURE — 99213 OFFICE O/P EST LOW 20 MIN: CPT | Performed by: OPHTHALMOLOGY

## 2025-01-13 ASSESSMENT — REFRACTION_CURRENTRX
OS_SPHERE: +0.50
OS_ADD: +2.25
OD_OVR_VA: 20/
OD_VPRISM_DIRECTION: PROGS
OS_AXIS: 095
OS_OVR_VA: 20/
OD_SPHERE: -1.00
OS_CYLINDER: -1.25
OD_CYLINDER: -0.25
OD_AXIS: 110
OS_VPRISM_DIRECTION: PROGS
OD_ADD: +2.25

## 2025-01-13 ASSESSMENT — KERATOMETRY
OS_K2POWER_DIOPTERS: 46.00
OD_K1POWER_DIOPTERS: 45.75
OS_K1POWER_DIOPTERS: 45.75
OD_K2POWER_DIOPTERS: 46.00
OS_AXISANGLE_DEGREES: 120
OD_AXISANGLE_DEGREES: 011

## 2025-01-13 ASSESSMENT — REFRACTION_MANIFEST
OD_VA2: 20/30-2
OD_ADD: +2.75
OS_CYLINDER: -1.25
OD_AXIS: 115
OS_AXIS: 090
OU_VA: 20/20
OD_SPHERE: -1.00
OS_ADD: +2.75
OD_VA1: 20/30-2
OS_VA1: 20/20
OD_CYLINDER: -0.25
OS_SPHERE: +0.25
OS_VA2: 20/20

## 2025-01-13 ASSESSMENT — REFRACTION_AUTOREFRACTION
OS_SPHERE: 0.00
OS_AXIS: 080
OS_CYLINDER: -1.50
OD_SPHERE: -1.00
OD_AXIS: 114
OD_CYLINDER: -0.50

## 2025-01-13 ASSESSMENT — LID EXAM ASSESSMENTS
OS_BLEPHARITIS: LLL LUL
OD_BLEPHARITIS: RLL RUL

## 2025-01-13 ASSESSMENT — CONFRONTATIONAL VISUAL FIELD TEST (CVF)
OS_FINDINGS: FULL
OD_FINDINGS: FULL

## 2025-01-13 ASSESSMENT — VISUAL ACUITY
OS_BCVA: 20/40+2
OD_BCVA: 20/20-1

## 2025-01-14 ENCOUNTER — OFFICE VISIT (OUTPATIENT)
Dept: PHYSICAL THERAPY | Facility: CLINIC | Age: 85
End: 2025-01-14
Payer: MEDICARE

## 2025-01-14 DIAGNOSIS — Z96.651 S/P TOTAL KNEE ARTHROPLASTY, RIGHT: Primary | ICD-10-CM

## 2025-01-14 PROCEDURE — 97110 THERAPEUTIC EXERCISES: CPT

## 2025-01-14 PROCEDURE — 97140 MANUAL THERAPY 1/> REGIONS: CPT

## 2025-01-14 NOTE — PROGRESS NOTES
"Daily Note     Today's date: 2025  Patient name: Amish Pulido  : 1940  MRN: [id number]  Referring provider: Daniel Malone MD  Dx:   Encounter Diagnosis     ICD-10-CM    1. S/P total knee arthroplasty, right  Z96.651           Start Time: 0900  Stop Time: 1005  Total time in clinic (min): 65 minutes    Subjective: Patient reports he is a little more stiff today and is having some R SIJ pain.      Objective: See treatment diary below      Assessment: Tolerated treatment well. Patient displayed posterior rotation of R SIJ that was addressed with corrective techniques and showed improvement at end of session. Patient was given HEP to address this concern since it seems to be a reoccurring issue. Patient demonstrated fatigue post treatment, exhibited good technique with therapeutic exercises, and would benefit from continued PT to improve RLE strength and knee mobility to improve gait pattern and functional mobility.      Plan: Continue per plan of care.      Precautions: PMH R TKA, HTN, Irregular heartbeat  POC Expiration:       Manuals      R kne flex/ext mobs  10' c patellar mobs 10' c patellar mobs     R HS, piriformis, hip abd, and gastroc stretching  10' 10'                     Neuro Re-Ed        BOSU SLR        BOSU Squats        Step ups front and lateral        Step up and over        HR on step        Front and lateral lunges        Terminal knee extension  10x5\" hold GTB NT     Ther Ex        SRC for ROM and strength 10' L1 10' L1 10' L1     QS   10x5\" hold     Bridge c hip ABD  2x10 GTB 2x10 GTB     Clamshells   2x10 GTB R only  2x10 GTB     Supine and SL SLR  2x10 ea R only 2x10 ea R only      Prone hip /                HEP education and instruction 15'  5'     Ther Activity                        Gait Training                        Modalities        CP PRN  CP R knee 10'                     "

## 2025-01-17 ENCOUNTER — OFFICE VISIT (OUTPATIENT)
Dept: PHYSICAL THERAPY | Facility: CLINIC | Age: 85
End: 2025-01-17
Payer: MEDICARE

## 2025-01-17 DIAGNOSIS — Z96.651 S/P TOTAL KNEE ARTHROPLASTY, RIGHT: Primary | ICD-10-CM

## 2025-01-17 PROCEDURE — 97140 MANUAL THERAPY 1/> REGIONS: CPT

## 2025-01-17 PROCEDURE — 97110 THERAPEUTIC EXERCISES: CPT

## 2025-01-17 PROCEDURE — 97535 SELF CARE MNGMENT TRAINING: CPT

## 2025-01-17 NOTE — PROGRESS NOTES
"Daily Note     Today's date: 2025  Patient name: Amish Pulido  : 1940  MRN: [id number]  Referring provider: Daniel Malone MD  Dx:   Encounter Diagnosis     ICD-10-CM    1. S/P total knee arthroplasty, right  Z96.651           Start Time: 1000  Stop Time: 1100  Total time in clinic (min): 60 minutes    Subjective: Patient reports he is doing very well and was able to bend his knee enough to put his R ankle over his L knee to put his pants on.       Objective: See treatment diary below      Assessment: Tolerated treatment well. Patient was given updated HEP to include standing strengthening exercises to improve eccentric strength. Patient did well with progression in HEP and had little pain. Patient demonstrated fatigue post treatment, exhibited good technique with therapeutic exercises, and would benefit from continued PT to improve R knee mobility and strength to improve gait pattern and return to recreational activities.       Plan: Continue per plan of care.      Precautions: PMH R TKA, HTN, Irregular heartbeat  POC Expiration:       Manuals     R kne flex/ext mobs  10' c patellar mobs 10' c patellar mobs 10'     R HS, piriformis, hip abd, and gastroc stretching  10' 10' 5'                    Neuro Re-Ed        BOSU SLR        BOSU Squats        Step ups front and lateral        Step up and over        HR on step        Front and lateral lunges    10x ea bilat    Terminal knee extension  10x5\" hold GTB NT 10x5\" hold BTB    Ther Ex        SRC for ROM and strength 10' L1 10' L1 10' L1 10' L1    QS   10x5\" hold     Bridge c hip ABD  2x10 GTB 2x10 GTB 2x10 GTB    Clamshells   2x10 GTB R only  2x10 GTB NT    Supine and SL SLR  2x10 ea R only 2x10 ea R only  2x10 ea R only     Prone hip /                HEP education and instruction 15'  5' 10'     Ther Activity                        Gait Training                        Modalities        CP PRN  CP R knee 10' CP R knee 10'                    "

## 2025-01-20 ENCOUNTER — OFFICE VISIT (OUTPATIENT)
Dept: PHYSICAL THERAPY | Facility: CLINIC | Age: 85
End: 2025-01-20
Payer: MEDICARE

## 2025-01-20 DIAGNOSIS — Z96.651 S/P TOTAL KNEE ARTHROPLASTY, RIGHT: Primary | ICD-10-CM

## 2025-01-20 PROCEDURE — 97112 NEUROMUSCULAR REEDUCATION: CPT

## 2025-01-20 PROCEDURE — 97140 MANUAL THERAPY 1/> REGIONS: CPT

## 2025-01-20 PROCEDURE — 97110 THERAPEUTIC EXERCISES: CPT

## 2025-01-20 NOTE — PROGRESS NOTES
"Daily Note     Today's date: 2025  Patient name: Amish Pulido  : 1940  MRN: [id number]  Referring provider: Daniel Malone MD  Dx:   Encounter Diagnosis     ICD-10-CM    1. S/P total knee arthroplasty, right  Z96.651                      Subjective: Patient reports that he is tolerating his OP PT well and continues to be compliant with his HEP.  Patient notes some muscle fatigue post sessions, but is pleased with his progress.      Objective: See treatment diary below      Assessment: Tolerated treatment well. Patient exhibited good technique with therapeutic exercises and would benefit from continued PT to increase R knee ROM/strength and endurance to improve his functional mobility and gait.      Plan: Continue per plan of care.      Precautions: PMH R TKA, HTN, Irregular heartbeat  POC Expiration:       Manuals    R kne flex/ext mobs  10' c patellar mobs 10' c patellar mobs 10'  10'   R HS, piriformis, hip abd, and gastroc stretching  10' 10' 5' 5'                   Neuro Re-Ed        BOSU SLR        BOSU Squats        Step ups front and lateral        Step up and over        HR on step        Front and lateral lunges    10x ea bilat 10xea bilat   Terminal knee extension  10x5\" hold GTB NT 10x5\" hold BTB 10x15\" Blue   Ther Ex        SRC for ROM and strength 10' L1 10' L1 10' L1 10' L1 10'L1   QS   10x5\" hold     Bridge c hip ABD  2x10 GTB 2x10 GTB 2x10 GTB 2x10 GTB   Clamshells   2x10 GTB R only  2x10 GTB NT [id number] GTB   Supine and SL SLR  2x10 ea R only 2x10 ea R only  2x10 ea R only  2x10ea bilat    Prone hip /                HEP education and instruction 15'  5' 10'     Ther Activity                        Gait Training                        Modalities        CP PRN  CP R knee 10' CP R knee 10' 10' to R knee                     "

## 2025-01-22 NOTE — PROGRESS NOTES
"Daily Note     Today's date: 2025  Patient name: Amish Pulido  : 1940  MRN: [id number]  Referring provider: Daniel Malone MD  Dx:   Encounter Diagnosis     ICD-10-CM    1. S/P total knee arthroplasty, right  Z96.651                      Subjective: Patient reports that his f/u with surgeon has been rescheduled to the 30th.  Patient voices continued compliance with his home program.      Objective: See treatment diary below      Assessment: Tolerated treatment well. Patient exhibited good technique with therapeutic exercises and would benefit from continued PT to increase R knee ROM/strength and endurance to improve his mobility and gait.  Bandage removed from R knee today c no skin irritation noted around incision.  Patient progressing nicely, will continue to focus on terminal extension of R knee.      Plan: Continue per plan of care.      Precautions: PMH R TKA, HTN, Irregular heartbeat  POC Expiration:       Manuals    R kne flex/ext mobs 10' c patellar mobs 10' c patellar mobs 10'  10' 10'   R HS, piriformis, hip abd, and gastroc stretching 10' 10' 5' 5' 5'                   Neuro Re-Ed       BOSU SLR        BOSU Squats        Step ups front and lateral        Step up and over        HR on step        Front and lateral lunges   10x ea bilat 10xea bilat 10xea bilat   Terminal knee extension 10x5\" hold GTB NT 10x5\" hold BTB 10x15\" Blue 15x5\" Blue   Ther Ex       SRC for ROM and strength 10' L1 10' L1 10' L1 10'L1 10'L1   QS  10x5\" hold      Bridge c hip ABD 2x10 GTB 2x10 GTB 2x10 GTB 2x10 GTB 2x10 Blue   Clamshells  2x10 GTB R only  2x10 GTB NT [id number] GTB [id number] Blue   Supine and SL SLR 2x10 ea R only 2x10 ea R only  2x10 ea R only  2x10ea bilat  2x10ea bilat   Prone hip /                HEP education and instruction  5' 10'      Ther Activity                       Gait Training                       Modalities       CP  CP R knee " 10' CP R knee 10' 10' to R knee 10' to R knee

## 2025-01-23 ENCOUNTER — DOCTOR'S OFFICE (OUTPATIENT)
Dept: URBAN - NONMETROPOLITAN AREA CLINIC 1 | Facility: CLINIC | Age: 85
Setting detail: OPHTHALMOLOGY
End: 2025-01-23
Payer: MEDICARE

## 2025-01-23 ENCOUNTER — OFFICE VISIT (OUTPATIENT)
Dept: PHYSICAL THERAPY | Facility: CLINIC | Age: 85
End: 2025-01-23
Payer: MEDICARE

## 2025-01-23 DIAGNOSIS — Z96.651 S/P TOTAL KNEE ARTHROPLASTY, RIGHT: Primary | ICD-10-CM

## 2025-01-23 DIAGNOSIS — H35.371: ICD-10-CM

## 2025-01-23 DIAGNOSIS — H35.81: ICD-10-CM

## 2025-01-23 DIAGNOSIS — H53.121: ICD-10-CM

## 2025-01-23 PROCEDURE — 97112 NEUROMUSCULAR REEDUCATION: CPT

## 2025-01-23 PROCEDURE — 97110 THERAPEUTIC EXERCISES: CPT

## 2025-01-23 PROCEDURE — 99213 OFFICE O/P EST LOW 20 MIN: CPT | Performed by: OPHTHALMOLOGY

## 2025-01-23 PROCEDURE — 97140 MANUAL THERAPY 1/> REGIONS: CPT

## 2025-01-23 ASSESSMENT — REFRACTION_CURRENTRX
OD_AXIS: 110
OD_SPHERE: -1.00
OS_OVR_VA: 20/
OS_AXIS: 095
OS_SPHERE: +0.50
OD_ADD: +2.25
OD_OVR_VA: 20/
OD_VPRISM_DIRECTION: PROGS
OD_CYLINDER: -0.25
OS_CYLINDER: -1.25
OS_VPRISM_DIRECTION: PROGS
OS_ADD: +2.25

## 2025-01-23 ASSESSMENT — LID EXAM ASSESSMENTS
OS_BLEPHARITIS: LLL LUL
OD_BLEPHARITIS: RLL RUL

## 2025-01-23 ASSESSMENT — CONFRONTATIONAL VISUAL FIELD TEST (CVF)
OD_FINDINGS: FULL
OS_FINDINGS: FULL

## 2025-01-23 ASSESSMENT — REFRACTION_MANIFEST
OS_CYLINDER: -1.25
OD_VA1: 20/30-2
OS_ADD: +2.75
OS_SPHERE: +0.25
OD_VA2: 20/30-2
OS_AXIS: 090
OD_SPHERE: -1.00
OD_ADD: +2.75
OS_VA1: 20/20
OS_VA2: 20/20
OD_AXIS: 115
OU_VA: 20/20
OD_CYLINDER: -0.25

## 2025-01-23 ASSESSMENT — VISUAL ACUITY
OS_BCVA: 20/50+2
OD_BCVA: 20/30-2

## 2025-01-23 ASSESSMENT — REFRACTION_AUTOREFRACTION
OS_CYLINDER: -1.50
OD_CYLINDER: -0.50
OS_AXIS: 080
OS_SPHERE: 0.00
OD_SPHERE: -1.00
OD_AXIS: 114

## 2025-01-27 ENCOUNTER — OFFICE VISIT (OUTPATIENT)
Dept: PHYSICAL THERAPY | Facility: CLINIC | Age: 85
End: 2025-01-27
Payer: MEDICARE

## 2025-01-27 DIAGNOSIS — Z96.651 S/P TOTAL KNEE ARTHROPLASTY, RIGHT: Primary | ICD-10-CM

## 2025-01-27 PROCEDURE — 97140 MANUAL THERAPY 1/> REGIONS: CPT

## 2025-01-27 PROCEDURE — 97110 THERAPEUTIC EXERCISES: CPT

## 2025-01-27 PROCEDURE — 97112 NEUROMUSCULAR REEDUCATION: CPT

## 2025-01-27 NOTE — PROGRESS NOTES
"Daily Note     Today's date: 2025  Patient name: Amish Pulido  : 1940  MRN: [id number]  Referring provider: Daniel Malone MD  Dx:   Encounter Diagnosis     ICD-10-CM    1. S/P total knee arthroplasty, right  Z96.651                      Subjective: Patient reports that he is doing well and continuing to stay as active as he can.  Patient continues to be compliant with his home program as well as being consistent with his OP PT.      Objective: See treatment diary below      Assessment: Tolerated treatment well. Patient exhibited good technique with therapeutic exercises and would benefit from continued PT to increase R knee ROM/strength and endurance to improve his mobility and gait.  Patient able to tolerate progression of program today c no c/o adverse effects post.        Plan: Continue per plan of care.      Precautions: PMH R TKA, HTN, Irregular heartbeat  POC Expiration:       Manuals    R kne flex/ext mobs 10' c patellar mobs 10'  10' 10' 10'   R HS, piriformis, hip abd, and gastroc stretching 10' 5' 5' 5' 5'                   Neuro Re-Ed      BOSU SLR        BOSU Squats        Step ups front and lateral     2x10ea bilat 8\"step   Step up and over     [id number]\"[id number]   HR on step        Front and lateral lunges  10x ea bilat 10xea bilat 10xea bilat 10xea bilat   Terminal knee extension NT 10x5\" hold BTB 10x15\" Blue 15x5\" Blue NT   Ther Ex      SRC for ROM and strength 10' L1 10' L1 10'L1 10'L1 10'L5   QS 10x5\" hold       Bridge c hip ABD 2x10 GTB 2x10 GTB 2x10 GTB 2x10 Blue 2x10 Blue   Clamshells  2x10 GTB NT [id number] GTB [id number] Blue [id number] Blue   Supine and SL SLR 2x10 ea R only  2x10 ea R only  2x10ea bilat  2x10ea bilat 2x10ea bilat   Prone hip /        Leg Press     95#DL 2x10   HEP education and instruction 5' 10'       Ther Activity                      Gait Training                      Modalities    " 1/23 1/27   CP CP R knee 10' CP R knee 10' 10' to R knee 10' to R knee 15' to R knee

## 2025-01-31 ENCOUNTER — OFFICE VISIT (OUTPATIENT)
Dept: PHYSICAL THERAPY | Facility: CLINIC | Age: 85
End: 2025-01-31
Payer: MEDICARE

## 2025-01-31 DIAGNOSIS — Z96.651 S/P TOTAL KNEE ARTHROPLASTY, RIGHT: Primary | ICD-10-CM

## 2025-01-31 PROCEDURE — 97535 SELF CARE MNGMENT TRAINING: CPT

## 2025-01-31 PROCEDURE — 97110 THERAPEUTIC EXERCISES: CPT

## 2025-01-31 PROCEDURE — 97140 MANUAL THERAPY 1/> REGIONS: CPT

## 2025-01-31 NOTE — PROGRESS NOTES
PT Discharge    Today's date: 2025  Patient name: Amish Pulido  : 1940  MRN: [id number]  Referring provider: Daniel Malone MD  Dx:   Encounter Diagnosis     ICD-10-CM    1. S/P total knee arthroplasty, right  Z96.651           Start Time: 955  Stop Time: 1055  Total time in clinic (min): 60 minutes    Assessment  Impairments: abnormal gait, abnormal or restricted ROM, activity intolerance, impaired physical strength, lacks appropriate home exercise program, pain with function, participation limitations and activity limitations    Assessment details: Patient has been seen by OP PT for 1 month and has made great progress towards therapy goals. He has regained full ROM and strength in R knee and has returned to driving. He has little to no pain and has been adherent to his HEP. At this time, he has met his goals for therapy and will be Dc'd with HEP.     Goals  STG to be met within 4 weeks:  1. Pt will increase R knee strength my 1/2 muscle grades on MMT to improve functional mobility. - met   2. Pt will improve R knee AROM to 0-5-120 to improve gait pattern and functional mobility.- met  3. Pt will report decrease in worst pain to 2/10 to reduce pain with function. - met  4. Pt will improve FOTO score by 10 points to improve functional mobility. - met  5. Pt will be independent in HEP to improve functional mobility. - met    LTG to be met within 8 weeks:  1. Pt will improve R knee strength to WNL to improve mobility and gait. -met  2. Pt will improve R knee AROM to WNL to improve mobility and gait.  - met  3. Pt will reach FOTO score score by DC. - met  4. Pt will be I in HEP to return to PLOF. - met         Plan    Planned therapy interventions: home exercise program, patient/caregiver education and self care    Treatment plan discussed with: patient  Plan details: Transition to HEP.        Subjective Evaluation    History of Present Illness  Date of surgery: 1/3/2025  Mechanism of injury:  surgery  Mechanism of injury: Patient states he saw his MD for a f/u and he is very happy with his progress and states he is ready for DC. He has regained full ROM and good strength and is able to return to PLOF as he tolerates. He is very very pleased with his progress.   Patient Goals  Patient goals for therapy: decreased pain, decreased edema, improved balance, increased motion, increased strength, independence with ADLs/IADLs and return to sport/leisure activities    Pain  Current pain ratin  At best pain ratin  At worst pain ratin  Quality: dull ache  Relieving factors: medications and ice  Aggravating factors: stair climbing, walking and standing  Progression: improved    Treatments  Current treatment: physical therapy        Objective     Tenderness     Right Knee   No tenderness in the patellar tendon and quadriceps tendon.     Active Range of Motion     Right Knee   Flexion: 120 degrees   Extension: 2 degrees     Passive Range of Motion     Right Knee   Flexion: WFL  Extension: WFL    Patellar Static Positioning   Right Knee: WFL    Strength/Myotome Testing     Right Knee   Flexion: 5  Extension: 5  Quadriceps contraction: good               Precautions: PMH R TKA, HTN, Irregular heartbeat  POC Expiration:       Manuals        R kne flex/ext mobs        R HS, piriformis, hip abd, and gastroc stretching                        Neuro Re-Ed        BOSU SLR        BOSU Squats        Step ups front and lateral        Step up and over        HR on step        Front and lateral lunges                Ther Ex        SRC for ROM and strength 10' L1       Leg press        Bridge c hip ABD        Clamshells         Supine and SL SLR        Prone hip /                HEP education and instruction 15'       Ther Activity                        Gait Training                        Modalities        CP PRN

## 2025-01-31 NOTE — LETTER
2025    Daniel Malone MD  [address]  [address]  [facility] [address]    Patient: Amish Pulido   YOB: 1940   Date of Visit: 2025     Encounter Diagnosis     ICD-10-CM    1. S/P total knee arthroplasty, right  Z96.651           Dear Dr. Malone:    Thank you for your recent referral of Amish Pulido. Please review the attached discharge summary from Amish's recent visit.     Please verify that you agree with the plan of care by signing the attached order.     If you have any questions or concerns, please do not hesitate to call.     I sincerely appreciate the opportunity to share in the care of one of your patients and hope to have another opportunity to work with you in the near future.       Sincerely,    Jazmine Sibley, PT      Referring Provider:      I certify that I have read the below Plan of Care and certify the need for these services furnished under this plan of treatment while under my care.                    Daniel Malone MD  [address]  [address]  [facility] [address]  Via Fax: [phone]          Daily Note     Today's date: 2025  Patient name: Amish Pulido  : 1940  MRN: [id number]  Referring provider: Daniel Malone MD  Dx:   Encounter Diagnosis     ICD-10-CM    1. S/P total knee arthroplasty, right  Z96.651                      Subjective: Patient reports that his follow up with surgeon went well.  Dr was comfortable with patient moving towards discharge from OPPT.      Objective: See treatment diary below      Assessment: Tolerated treatment well. Patient to transition from skilled rehab to HEP today.  Patient educated on and demonstrated a good understanding of his HEP.  Patient very motivated  and understands benefits of compliance with HEP.      Plan: Patient discharged to HEP at this time.     Precautions: PMH R TKA, HTN, Irregular heartbeat  POC Expiration:       Manuals    R kne flex/ext mobs 10'  10' 10'  "10' 10'   R HS, piriformis, hip abd, and gastroc stretching 5' 5' 5' 5' 5'                   Neuro Re-Ed     BOSU SLR        BOSU Squats        Step ups front and lateral    2x10ea bilat 8\"step    Step up and over    [id number] 8\"jesus    HR on step        Front and lateral lunges 10x ea bilat 10xea bilat 10xea bilat 10xea bilat    Terminal knee extension 10x5\" hold BTB 10x15\" Blue 15x5\" Blue NT    Ther Ex     SRC for ROM and strength 10' L1 10'L1 10'L1 10'L5 10'L5   QS        Bridge c hip ABD 2x10 GTB 2x10 GTB 2x10 Blue 2x10 Blue    Clamshells  NT [id number] GTB [id number] Blue 2m84xsfgb Blue    Supine and SL SLR 2x10 ea R only  2x10ea bilat  2x10ea bilat 2x10ea bilat    Prone hip /        Leg Press    95#DL 2x10    HEP education and instruction 10'     25'   Ther Activity                     Gait Training                     Modalities     CP CP R knee 10' 10' to R knee 10' to R knee 15' to R knee declined                       Attestation signed by Jazmine Sibley PT at 2025 11:18 AM:  I supervised the visit.  We discussed the case to ensure appropriate continuation and progression of care and I reviewed the documentation.     PT Discharge    Today's date: 2025  Patient name: Amish Pulido  : 1940  MRN: [id number]  Referring provider: Daniel Malone MD  Dx:   Encounter Diagnosis     ICD-10-CM    1. S/P total knee arthroplasty, right  Z96.651           Start Time: 955  Stop Time:   Total time in clinic (min): 60 minutes    Assessment  Impairments: abnormal gait, abnormal or restricted ROM, activity intolerance, impaired physical strength, lacks appropriate home exercise program, pain with function, participation limitations and activity limitations    Assessment details: Patient has been seen by OP PT for 1 month and has made great progress towards therapy goals. He has regained full ROM and strength in R " knee and has returned to driving. He has little to no pain and has been adherent to his HEP. At this time, he has met his goals for therapy and will be Dc'd with HEP.     Goals  STG to be met within 4 weeks:  1. Pt will increase R knee strength my 1/2 muscle grades on MMT to improve functional mobility. - met   2. Pt will improve R knee AROM to 0-5-120 to improve gait pattern and functional mobility.- met  3. Pt will report decrease in worst pain to 2/10 to reduce pain with function. - met  4. Pt will improve FOTO score by 10 points to improve functional mobility. - met  5. Pt will be independent in HEP to improve functional mobility. - met    LTG to be met within 8 weeks:  1. Pt will improve R knee strength to WNL to improve mobility and gait. -met  2. Pt will improve R knee AROM to WNL to improve mobility and gait.  - met  3. Pt will reach FOTO score score by DC. - met  4. Pt will be I in HEP to return to PLOF. - met         Plan    Planned therapy interventions: home exercise program, patient/caregiver education and self care    Treatment plan discussed with: patient  Plan details: Transition to HEP.        Subjective Evaluation    History of Present Illness  Date of surgery: 1/3/2025  Mechanism of injury: surgery  Mechanism of injury: Patient states he saw his MD for a f/u and he is very happy with his progress and states he is ready for DC. He has regained full ROM and good strength and is able to return to PLOF as he tolerates. He is very very pleased with his progress.   Patient Goals  Patient goals for therapy: decreased pain, decreased edema, improved balance, increased motion, increased strength, independence with ADLs/IADLs and return to sport/leisure activities    Pain  Current pain ratin  At best pain ratin  At worst pain ratin  Quality: dull ache  Relieving factors: medications and ice  Aggravating factors: stair climbing, walking and standing  Progression: improved    Treatments  Current  treatment: physical therapy        Objective     Tenderness     Right Knee   No tenderness in the patellar tendon and quadriceps tendon.     Active Range of Motion     Right Knee   Flexion: 120 degrees   Extension: 2 degrees     Passive Range of Motion     Right Knee   Flexion: WFL  Extension: WFL    Patellar Static Positioning   Right Knee: WFL    Strength/Myotome Testing     Right Knee   Flexion: 5  Extension: 5  Quadriceps contraction: good               Precautions: PMH R TKA, HTN, Irregular heartbeat  POC Expiration:       Manuals 1/6       R kne flex/ext mobs        R HS, piriformis, hip abd, and gastroc stretching                        Neuro Re-Ed        BOSU SLR        BOSU Squats        Step ups front and lateral        Step up and over        HR on step        Front and lateral lunges                Ther Ex        SRC for ROM and strength 10' L1       Leg press        Bridge c hip ABD        Clamshells         Supine and SL SLR        Prone hip /                HEP education and instruction 15'       Ther Activity                        Gait Training                        Modalities        CP PRN

## 2025-01-31 NOTE — PROGRESS NOTES
"Daily Note     Today's date: 2025  Patient name: Amish Pulido  : 1940  MRN: [id number]  Referring provider: Daniel Malone MD  Dx:   Encounter Diagnosis     ICD-10-CM    1. S/P total knee arthroplasty, right  Z96.651                      Subjective: Patient reports that his follow up with surgeon went well.  Dr was comfortable with patient moving towards discharge from OPPT.      Objective: See treatment diary below      Assessment: Tolerated treatment well. Patient to transition from skilled rehab to HEP today.  Patient educated on and demonstrated a good understanding of his HEP.  Patient very motivated  and understands benefits of compliance with HEP.      Plan: Patient discharged to HEP at this time.     Precautions: PMH R TKA, HTN, Irregular heartbeat  POC Expiration:       Manuals    R kne flex/ext mobs 10'  10' 10' 10' 10'   R HS, piriformis, hip abd, and gastroc stretching 5' 5' 5' 5' 5'                   Neuro Re-Ed     BOSU SLR        BOSU Squats        Step ups front and lateral    2x10ea bilat 8\"step    Step up and over    [id number] 8\"jesus    HR on step        Front and lateral lunges 10x ea bilat 10xea bilat 10xea bilat 10xea bilat    Terminal knee extension 10x5\" hold BTB 10x15\" Blue 15x5\" Blue NT    Ther Ex     SRC for ROM and strength 10' L1 10'L1 10'L1 10'L5 10'L5   QS        Bridge c hip ABD 2x10 GTB 2x10 GTB 2x10 Blue 2x10 Blue    Clamshells  NT [id number] GTB [id number] Blue [id number] Blue    Supine and SL SLR 2x10 ea R only  2x10ea bilat  2x10ea bilat 2x10ea bilat    Prone hip /        Leg Press    95#DL 2x10    HEP education and instruction 10'     25'   Ther Activity                     Gait Training                     Modalities     CP CP R knee 10' 10' to R knee 10' to R knee 15' to R knee declined                     "

## 2025-02-06 ENCOUNTER — DOCTOR'S OFFICE (OUTPATIENT)
Dept: URBAN - NONMETROPOLITAN AREA CLINIC 1 | Facility: CLINIC | Age: 85
Setting detail: OPHTHALMOLOGY
End: 2025-02-06
Payer: MEDICARE

## 2025-02-06 DIAGNOSIS — H53.121: ICD-10-CM

## 2025-02-06 DIAGNOSIS — H35.81: ICD-10-CM

## 2025-02-06 DIAGNOSIS — H35.371: ICD-10-CM

## 2025-02-06 PROCEDURE — 99213 OFFICE O/P EST LOW 20 MIN: CPT | Performed by: OPHTHALMOLOGY

## 2025-02-06 PROCEDURE — 92235 FLUORESCEIN ANGRPH MLTIFRAME: CPT | Performed by: OPHTHALMOLOGY

## 2025-02-06 PROCEDURE — 92250 FUNDUS PHOTOGRAPHY W/I&R: CPT | Performed by: OPHTHALMOLOGY

## 2025-02-06 ASSESSMENT — REFRACTION_CURRENTRX
OS_OVR_VA: 20/
OS_VPRISM_DIRECTION: PROGS
OD_OVR_VA: 20/
OS_AXIS: 095
OS_SPHERE: +0.50
OD_ADD: +2.25
OS_ADD: +2.25
OD_SPHERE: -1.00
OD_CYLINDER: -0.25
OS_CYLINDER: -1.25
OD_VPRISM_DIRECTION: PROGS
OD_AXIS: 110

## 2025-02-06 ASSESSMENT — KERATOMETRY
OS_AXISANGLE_DEGREES: 120
OD_K2POWER_DIOPTERS: 46.00
OD_AXISANGLE_DEGREES: 011
OD_K1POWER_DIOPTERS: 45.75
OS_K1POWER_DIOPTERS: 45.75
OS_K2POWER_DIOPTERS: 46.00

## 2025-02-06 ASSESSMENT — REFRACTION_MANIFEST
OS_ADD: +2.75
OD_VA2: 20/30-2
OS_AXIS: 090
OD_CYLINDER: -0.25
OS_CYLINDER: -1.25
OD_AXIS: 115
OD_SPHERE: -1.00
OD_VA1: 20/30-2
OU_VA: 20/20
OS_VA1: 20/20
OD_ADD: +2.75
OS_VA2: 20/20
OS_SPHERE: +0.25

## 2025-02-06 ASSESSMENT — REFRACTION_AUTOREFRACTION
OD_SPHERE: -1.00
OS_SPHERE: 0.00
OD_AXIS: 114
OS_AXIS: 080
OD_CYLINDER: -0.50
OS_CYLINDER: -1.50

## 2025-02-06 ASSESSMENT — CONFRONTATIONAL VISUAL FIELD TEST (CVF)
OS_FINDINGS: FULL
OD_FINDINGS: FULL

## 2025-02-06 ASSESSMENT — VISUAL ACUITY
OD_BCVA: 20/30-2
OS_BCVA: 20/30-2

## 2025-02-06 ASSESSMENT — LID EXAM ASSESSMENTS
OD_BLEPHARITIS: RLL RUL
OS_BLEPHARITIS: LLL LUL

## 2025-02-10 ENCOUNTER — DOCTOR'S OFFICE (OUTPATIENT)
Dept: URBAN - NONMETROPOLITAN AREA CLINIC 1 | Facility: CLINIC | Age: 85
Setting detail: OPHTHALMOLOGY
End: 2025-02-10
Payer: MEDICARE

## 2025-02-10 DIAGNOSIS — H35.81: ICD-10-CM

## 2025-02-10 PROCEDURE — 67515 INJECT/TREAT EYE SOCKET: CPT | Mod: RT | Performed by: OPHTHALMOLOGY

## 2025-02-11 ASSESSMENT — KERATOMETRY
OD_K1POWER_DIOPTERS: 45.75
OD_K2POWER_DIOPTERS: 46.00
OS_K1POWER_DIOPTERS: 45.75
OS_K2POWER_DIOPTERS: 46.00
OS_AXISANGLE_DEGREES: 120
OD_AXISANGLE_DEGREES: 011

## 2025-02-11 ASSESSMENT — REFRACTION_AUTOREFRACTION
OD_AXIS: 114
OS_SPHERE: 0.00
OS_AXIS: 080
OD_SPHERE: -1.00
OS_CYLINDER: -1.50
OD_CYLINDER: -0.50

## 2025-02-11 ASSESSMENT — VISUAL ACUITY
OD_BCVA: 20/30-2
OS_BCVA: 20/30-1

## 2025-03-06 ENCOUNTER — DOCTOR'S OFFICE (OUTPATIENT)
Dept: URBAN - NONMETROPOLITAN AREA CLINIC 1 | Facility: CLINIC | Age: 85
Setting detail: OPHTHALMOLOGY
End: 2025-03-06
Payer: MEDICARE

## 2025-03-06 DIAGNOSIS — H35.371: ICD-10-CM

## 2025-03-06 DIAGNOSIS — H35.81: ICD-10-CM

## 2025-03-06 PROCEDURE — 92134 CPTRZ OPH DX IMG PST SGM RTA: CPT | Performed by: OPHTHALMOLOGY

## 2025-03-06 PROCEDURE — 92012 INTRM OPH EXAM EST PATIENT: CPT | Performed by: OPHTHALMOLOGY

## 2025-03-06 ASSESSMENT — CONFRONTATIONAL VISUAL FIELD TEST (CVF)
OD_FINDINGS: FULL
OS_FINDINGS: FULL

## 2025-03-06 ASSESSMENT — REFRACTION_AUTOREFRACTION
OD_AXIS: 114
OD_SPHERE: -1.00
OD_CYLINDER: -0.50
OS_CYLINDER: -1.50
OS_AXIS: 080
OS_SPHERE: 0.00

## 2025-03-06 ASSESSMENT — LID EXAM ASSESSMENTS
OS_BLEPHARITIS: LLL LUL
OD_BLEPHARITIS: RLL RUL

## 2025-03-06 ASSESSMENT — VISUAL ACUITY
OD_BCVA: 20/25-2
OS_BCVA: 20/30-2

## 2025-03-06 ASSESSMENT — KERATOMETRY
OD_AXISANGLE_DEGREES: 011
OS_K2POWER_DIOPTERS: 46.00
OD_K1POWER_DIOPTERS: 45.75
OS_K1POWER_DIOPTERS: 45.75
OD_K2POWER_DIOPTERS: 46.00
OS_AXISANGLE_DEGREES: 120

## 2025-03-15 ENCOUNTER — DOCTOR'S OFFICE (OUTPATIENT)
Dept: URBAN - NONMETROPOLITAN AREA CLINIC 1 | Facility: CLINIC | Age: 85
Setting detail: OPHTHALMOLOGY
End: 2025-03-15
Payer: MEDICARE

## 2025-03-15 DIAGNOSIS — H35.81: ICD-10-CM

## 2025-03-15 PROCEDURE — 67515 INJECT/TREAT EYE SOCKET: CPT | Mod: RT | Performed by: OPHTHALMOLOGY

## 2025-03-15 ASSESSMENT — REFRACTION_AUTOREFRACTION
OD_SPHERE: -1.00
OD_CYLINDER: -0.50
OS_AXIS: 080
OD_AXIS: 114
OS_CYLINDER: -1.50
OS_SPHERE: 0.00

## 2025-03-15 ASSESSMENT — VISUAL ACUITY
OS_BCVA: 20/40-2
OD_BCVA: 20/25-2

## 2025-03-15 ASSESSMENT — KERATOMETRY
OS_K2POWER_DIOPTERS: 46.00
OS_K1POWER_DIOPTERS: 45.75
OD_AXISANGLE_DEGREES: 011
OD_K2POWER_DIOPTERS: 46.00
OD_K1POWER_DIOPTERS: 45.75
OS_AXISANGLE_DEGREES: 120

## 2025-04-07 ENCOUNTER — DOCTOR'S OFFICE (OUTPATIENT)
Dept: URBAN - NONMETROPOLITAN AREA CLINIC 1 | Facility: CLINIC | Age: 85
Setting detail: OPHTHALMOLOGY
End: 2025-04-07
Payer: MEDICARE

## 2025-04-07 DIAGNOSIS — H35.371: ICD-10-CM

## 2025-04-07 DIAGNOSIS — H40.1121: ICD-10-CM

## 2025-04-07 DIAGNOSIS — H40.1113: ICD-10-CM

## 2025-04-07 DIAGNOSIS — H35.81: ICD-10-CM

## 2025-04-07 PROCEDURE — 99213 OFFICE O/P EST LOW 20 MIN: CPT | Performed by: OPHTHALMOLOGY

## 2025-04-07 PROCEDURE — 92134 CPTRZ OPH DX IMG PST SGM RTA: CPT | Performed by: OPHTHALMOLOGY

## 2025-04-07 ASSESSMENT — REFRACTION_AUTOREFRACTION
OD_CYLINDER: -0.50
OD_SPHERE: -1.00
OS_SPHERE: 0.00
OS_CYLINDER: -1.50
OD_AXIS: 114
OS_AXIS: 080

## 2025-04-07 ASSESSMENT — CONFRONTATIONAL VISUAL FIELD TEST (CVF)
OD_FINDINGS: FULL
OS_FINDINGS: FULL

## 2025-04-07 ASSESSMENT — VISUAL ACUITY
OD_BCVA: 20/40+1
OS_BCVA: 20/50-2

## 2025-04-07 ASSESSMENT — LID EXAM ASSESSMENTS
OD_BLEPHARITIS: RLL RUL
OS_BLEPHARITIS: LLL LUL

## 2025-04-21 ENCOUNTER — DOCTOR'S OFFICE (OUTPATIENT)
Dept: URBAN - NONMETROPOLITAN AREA CLINIC 1 | Facility: CLINIC | Age: 85
Setting detail: OPHTHALMOLOGY
End: 2025-04-21
Payer: MEDICARE

## 2025-04-21 DIAGNOSIS — H35.371: ICD-10-CM

## 2025-04-21 DIAGNOSIS — H35.81: ICD-10-CM

## 2025-04-21 DIAGNOSIS — H40.1113: ICD-10-CM

## 2025-04-21 DIAGNOSIS — H40.1121: ICD-10-CM

## 2025-04-21 PROCEDURE — 92012 INTRM OPH EXAM EST PATIENT: CPT | Performed by: OPHTHALMOLOGY

## 2025-04-21 PROCEDURE — 92133 CPTRZD OPH DX IMG PST SGM ON: CPT | Performed by: OPHTHALMOLOGY

## 2025-04-21 ASSESSMENT — LID EXAM ASSESSMENTS
OS_BLEPHARITIS: LLL LUL
OD_BLEPHARITIS: RLL RUL

## 2025-04-21 ASSESSMENT — KERATOMETRY
OS_K1POWER_DIOPTERS: 45.75
OD_AXISANGLE_DEGREES: 011
OD_K1POWER_DIOPTERS: 45.75
OD_K2POWER_DIOPTERS: 46.00
OS_K2POWER_DIOPTERS: 46.00
OS_AXISANGLE_DEGREES: 120

## 2025-04-21 ASSESSMENT — REFRACTION_AUTOREFRACTION
OD_AXIS: 114
OS_SPHERE: 0.00
OS_AXIS: 080
OS_CYLINDER: -1.50
OD_SPHERE: -1.00
OD_CYLINDER: -0.50

## 2025-04-21 ASSESSMENT — VISUAL ACUITY
OS_BCVA: 20/60
OD_BCVA: 20/30-2

## 2025-04-29 ENCOUNTER — TELEPHONE (OUTPATIENT)
Age: 85
End: 2025-04-29

## 2025-04-29 NOTE — TELEPHONE ENCOUNTER
Patient is calling regarding cancelling an appointment.    Date/Time: 5/2/25 at 12pm    Reason: no reason provided    Patient was rescheduled: YES [x] NO []  If yes, when was Patient reschedule for: 5/6/25 at 12:30pm    Patient requesting call back to reschedule: YES [] NO [x]

## 2025-05-12 ENCOUNTER — DOCTOR'S OFFICE (OUTPATIENT)
Dept: URBAN - NONMETROPOLITAN AREA CLINIC 1 | Facility: CLINIC | Age: 85
Setting detail: OPHTHALMOLOGY
End: 2025-05-12
Payer: MEDICARE

## 2025-05-12 DIAGNOSIS — H35.81: ICD-10-CM

## 2025-05-12 PROCEDURE — 67515 INJECT/TREAT EYE SOCKET: CPT | Mod: RT | Performed by: OPHTHALMOLOGY

## 2025-05-12 ASSESSMENT — KERATOMETRY
OD_K2POWER_DIOPTERS: 46.00
OS_K1POWER_DIOPTERS: 45.75
OS_K2POWER_DIOPTERS: 46.00
OD_K1POWER_DIOPTERS: 45.75
OS_AXISANGLE_DEGREES: 120
OD_AXISANGLE_DEGREES: 011

## 2025-05-12 ASSESSMENT — REFRACTION_AUTOREFRACTION
OS_SPHERE: 0.00
OS_AXIS: 080
OD_AXIS: 114
OD_SPHERE: -1.00
OD_CYLINDER: -0.50
OS_CYLINDER: -1.50

## 2025-05-12 ASSESSMENT — VISUAL ACUITY
OS_BCVA: 20/40-1
OD_BCVA: 20/30-2

## 2025-05-19 ENCOUNTER — DOCTOR'S OFFICE (OUTPATIENT)
Dept: URBAN - NONMETROPOLITAN AREA CLINIC 1 | Facility: CLINIC | Age: 85
Setting detail: OPHTHALMOLOGY
End: 2025-05-19
Payer: MEDICARE

## 2025-05-19 DIAGNOSIS — H35.371: ICD-10-CM

## 2025-05-19 DIAGNOSIS — H40.1113: ICD-10-CM

## 2025-05-19 DIAGNOSIS — H40.1121: ICD-10-CM

## 2025-05-19 DIAGNOSIS — H35.81: ICD-10-CM

## 2025-05-19 DIAGNOSIS — H40.61X3: ICD-10-CM

## 2025-05-19 PROCEDURE — 99213 OFFICE O/P EST LOW 20 MIN: CPT | Performed by: OPHTHALMOLOGY

## 2025-05-19 PROCEDURE — 92134 CPTRZ OPH DX IMG PST SGM RTA: CPT | Performed by: OPHTHALMOLOGY

## 2025-05-19 ASSESSMENT — REFRACTION_AUTOREFRACTION
OS_CYLINDER: -1.50
OS_AXIS: 080
OS_SPHERE: 0.00
OD_CYLINDER: -0.50
OD_SPHERE: -1.00
OD_AXIS: 114

## 2025-05-19 ASSESSMENT — KERATOMETRY
OD_AXISANGLE_DEGREES: 011
OS_AXISANGLE_DEGREES: 120
OD_K1POWER_DIOPTERS: 45.75
OD_K2POWER_DIOPTERS: 46.00
OS_K1POWER_DIOPTERS: 45.75
OS_K2POWER_DIOPTERS: 46.00

## 2025-05-19 ASSESSMENT — LID EXAM ASSESSMENTS
OS_BLEPHARITIS: LLL LUL
OD_BLEPHARITIS: RLL RUL

## 2025-05-19 ASSESSMENT — CONFRONTATIONAL VISUAL FIELD TEST (CVF)
OD_FINDINGS: FULL
OS_FINDINGS: FULL

## 2025-05-19 ASSESSMENT — VISUAL ACUITY
OD_BCVA: 20/25
OS_BCVA: 20/60+1

## 2025-06-09 ENCOUNTER — OPTICAL OFFICE (OUTPATIENT)
Dept: URBAN - NONMETROPOLITAN AREA CLINIC 4 | Facility: CLINIC | Age: 85
Setting detail: OPHTHALMOLOGY
End: 2025-06-09
Payer: COMMERCIAL

## 2025-06-09 ENCOUNTER — DOCTOR'S OFFICE (OUTPATIENT)
Dept: URBAN - NONMETROPOLITAN AREA CLINIC 1 | Facility: CLINIC | Age: 85
Setting detail: OPHTHALMOLOGY
End: 2025-06-09
Payer: MEDICARE

## 2025-06-09 DIAGNOSIS — H35.81: ICD-10-CM

## 2025-06-09 DIAGNOSIS — H52.13: ICD-10-CM

## 2025-06-09 PROCEDURE — V2784 LENS POLYCARB OR EQUAL: HCPCS | Mod: LT | Performed by: OPTOMETRIST

## 2025-06-09 PROCEDURE — V2750 ANTI-REFLECTIVE COATING: HCPCS | Mod: PR | Performed by: OPTOMETRIST

## 2025-06-09 PROCEDURE — V2750 ANTI-REFLECTIVE COATING: HCPCS | Mod: LT | Performed by: OPTOMETRIST

## 2025-06-09 PROCEDURE — V2203 LENS SPHCYL BIFOCAL 4.00D/.1: HCPCS | Performed by: OPTOMETRIST

## 2025-06-09 PROCEDURE — V2020 VISION SVCS FRAMES PURCHASES: HCPCS | Performed by: OPTOMETRIST

## 2025-06-09 PROCEDURE — 67515 INJECT/TREAT EYE SOCKET: CPT | Mod: RT | Performed by: OPHTHALMOLOGY

## 2025-06-09 PROCEDURE — V2784 LENS POLYCARB OR EQUAL: HCPCS | Performed by: OPTOMETRIST

## 2025-06-09 PROCEDURE — V2781 PROGRESSIVE LENS PER LENS: HCPCS | Mod: LT | Performed by: OPTOMETRIST

## 2025-06-09 PROCEDURE — V2025 EYEGLASSES DELUX FRAMES: HCPCS | Performed by: OPTOMETRIST

## 2025-06-09 PROCEDURE — V2781 PROGRESSIVE LENS PER LENS: HCPCS | Mod: UT | Performed by: OPTOMETRIST

## 2025-06-09 ASSESSMENT — VISUAL ACUITY
OS_BCVA: 20/70-1
OD_BCVA: 20/25

## 2025-06-09 ASSESSMENT — REFRACTION_AUTOREFRACTION
OS_AXIS: 080
OD_CYLINDER: -0.50
OD_AXIS: 114
OD_SPHERE: -1.00
OS_CYLINDER: -1.50
OS_SPHERE: 0.00

## 2025-06-09 ASSESSMENT — KERATOMETRY
OS_K1POWER_DIOPTERS: 45.75
OD_K1POWER_DIOPTERS: 45.75
OS_K2POWER_DIOPTERS: 46.00
OD_K2POWER_DIOPTERS: 46.00
OD_AXISANGLE_DEGREES: 011
OS_AXISANGLE_DEGREES: 120

## 2025-06-23 ENCOUNTER — DOCTOR'S OFFICE (OUTPATIENT)
Dept: URBAN - NONMETROPOLITAN AREA CLINIC 1 | Facility: CLINIC | Age: 85
Setting detail: OPHTHALMOLOGY
End: 2025-06-23
Payer: MEDICARE

## 2025-06-23 DIAGNOSIS — H43.812: ICD-10-CM

## 2025-06-23 DIAGNOSIS — H35.81: ICD-10-CM

## 2025-06-23 DIAGNOSIS — H35.371: ICD-10-CM

## 2025-06-23 PROCEDURE — 99213 OFFICE O/P EST LOW 20 MIN: CPT | Performed by: OPHTHALMOLOGY

## 2025-06-23 PROCEDURE — 92134 CPTRZ OPH DX IMG PST SGM RTA: CPT | Performed by: OPHTHALMOLOGY

## 2025-06-23 ASSESSMENT — REFRACTION_AUTOREFRACTION
OS_AXIS: 080
OD_CYLINDER: -0.50
OD_SPHERE: -1.00
OS_CYLINDER: -1.50
OS_SPHERE: 0.00
OD_AXIS: 114

## 2025-06-23 ASSESSMENT — VISUAL ACUITY
OD_BCVA: 20/20-2
OS_BCVA: 20/80+1

## 2025-06-23 ASSESSMENT — CONFRONTATIONAL VISUAL FIELD TEST (CVF)
OD_FINDINGS: FULL
OS_FINDINGS: FULL

## 2025-06-23 ASSESSMENT — KERATOMETRY
OS_K1POWER_DIOPTERS: 45.75
OD_K2POWER_DIOPTERS: 46.00
OS_K2POWER_DIOPTERS: 46.00
OD_AXISANGLE_DEGREES: 011
OS_AXISANGLE_DEGREES: 120
OD_K1POWER_DIOPTERS: 45.75

## 2025-06-23 ASSESSMENT — LID EXAM ASSESSMENTS
OS_BLEPHARITIS: LLL LUL
OD_BLEPHARITIS: RLL RUL

## 2025-07-01 ENCOUNTER — OPTICAL OFFICE (OUTPATIENT)
Dept: URBAN - NONMETROPOLITAN AREA CLINIC 4 | Facility: CLINIC | Age: 85
Setting detail: OPHTHALMOLOGY
End: 2025-07-01

## 2025-07-01 DIAGNOSIS — H52.13: ICD-10-CM

## 2025-07-01 PROCEDURE — V2799 MISC VISION ITEM OR SERVICE: HCPCS | Performed by: OPTOMETRIST

## 2025-07-01 PROCEDURE — V2761 MIRROR COATING: HCPCS | Performed by: OPTOMETRIST

## 2025-07-01 PROCEDURE — V2020 VISION SVCS FRAMES PURCHASES: HCPCS | Performed by: OPTOMETRIST

## 2025-07-01 PROCEDURE — V2762 POLARIZATION, ANY LENS: HCPCS | Performed by: OPTOMETRIST

## 2025-07-01 PROCEDURE — V2762 POLARIZATION, ANY LENS: HCPCS | Mod: LT | Performed by: OPTOMETRIST

## 2025-07-01 PROCEDURE — V2761 MIRROR COATING: HCPCS | Mod: LT | Performed by: OPTOMETRIST

## 2025-07-06 DIAGNOSIS — F41.1 GAD (GENERALIZED ANXIETY DISORDER): ICD-10-CM

## 2025-07-06 DIAGNOSIS — G47.00 INSOMNIA, UNSPECIFIED TYPE: ICD-10-CM

## 2025-07-08 RX ORDER — CLONIDINE HYDROCHLORIDE 0.1 MG/1
TABLET ORAL
Qty: 180 TABLET | Refills: 1 | Status: SHIPPED | OUTPATIENT
Start: 2025-07-08

## 2025-07-14 ENCOUNTER — DOCTOR'S OFFICE (OUTPATIENT)
Dept: URBAN - NONMETROPOLITAN AREA CLINIC 1 | Facility: CLINIC | Age: 85
Setting detail: OPHTHALMOLOGY
End: 2025-07-14
Payer: MEDICARE

## 2025-07-14 DIAGNOSIS — H35.81: ICD-10-CM

## 2025-07-14 DIAGNOSIS — H43.812: ICD-10-CM

## 2025-07-14 DIAGNOSIS — H35.371: ICD-10-CM

## 2025-07-14 DIAGNOSIS — H40.61X3: ICD-10-CM

## 2025-07-14 PROCEDURE — 99214 OFFICE O/P EST MOD 30 MIN: CPT | Performed by: OPHTHALMOLOGY

## 2025-07-14 PROCEDURE — 92235 FLUORESCEIN ANGRPH MLTIFRAME: CPT | Performed by: OPHTHALMOLOGY

## 2025-07-14 PROCEDURE — 92250 FUNDUS PHOTOGRAPHY W/I&R: CPT | Performed by: OPHTHALMOLOGY

## 2025-07-14 ASSESSMENT — CONFRONTATIONAL VISUAL FIELD TEST (CVF)
OS_FINDINGS: FULL
OD_FINDINGS: FULL

## 2025-07-14 ASSESSMENT — REFRACTION_AUTOREFRACTION
OS_CYLINDER: -1.50
OD_CYLINDER: -0.50
OD_AXIS: 114
OS_SPHERE: 0.00
OS_AXIS: 080
OD_SPHERE: -1.00

## 2025-07-14 ASSESSMENT — VISUAL ACUITY
OS_BCVA: 20/70-1
OD_BCVA: 20/25+1

## 2025-07-14 ASSESSMENT — LID EXAM ASSESSMENTS
OS_BLEPHARITIS: LLL LUL
OD_BLEPHARITIS: RLL RUL

## 2025-07-18 ENCOUNTER — DOCTOR'S OFFICE (OUTPATIENT)
Dept: URBAN - NONMETROPOLITAN AREA CLINIC 1 | Facility: CLINIC | Age: 85
Setting detail: OPHTHALMOLOGY
End: 2025-07-18
Payer: MEDICARE

## 2025-07-18 DIAGNOSIS — H40.1113: ICD-10-CM

## 2025-07-18 DIAGNOSIS — H40.1121: ICD-10-CM

## 2025-07-18 PROCEDURE — 92083 EXTENDED VISUAL FIELD XM: CPT | Performed by: OPHTHALMOLOGY

## 2025-07-28 ENCOUNTER — DOCTOR'S OFFICE (OUTPATIENT)
Dept: URBAN - NONMETROPOLITAN AREA CLINIC 1 | Facility: CLINIC | Age: 85
Setting detail: OPHTHALMOLOGY
End: 2025-07-28
Payer: MEDICARE

## 2025-07-28 DIAGNOSIS — H35.81: ICD-10-CM

## 2025-07-28 DIAGNOSIS — H35.371: ICD-10-CM

## 2025-07-28 DIAGNOSIS — H40.61X3: ICD-10-CM

## 2025-07-28 DIAGNOSIS — H43.812: ICD-10-CM

## 2025-07-28 PROCEDURE — 99213 OFFICE O/P EST LOW 20 MIN: CPT | Performed by: OPHTHALMOLOGY

## 2025-07-28 ASSESSMENT — LID EXAM ASSESSMENTS
OS_BLEPHARITIS: LLL LUL
OD_BLEPHARITIS: RLL RUL

## 2025-07-28 ASSESSMENT — REFRACTION_AUTOREFRACTION
OS_CYLINDER: -1.50
OD_CYLINDER: -0.50
OS_SPHERE: 0.00
OD_SPHERE: -1.00
OS_AXIS: 080
OD_AXIS: 114

## 2025-07-28 ASSESSMENT — KERATOMETRY
OS_K1POWER_DIOPTERS: 45.75
OD_K2POWER_DIOPTERS: 46.00
OD_AXISANGLE_DEGREES: 011
OD_K1POWER_DIOPTERS: 45.75
OS_AXISANGLE_DEGREES: 120
OS_K2POWER_DIOPTERS: 46.00

## 2025-07-28 ASSESSMENT — VISUAL ACUITY
OS_BCVA: 20/100-1
OD_BCVA: 20/25+2

## 2025-08-11 ENCOUNTER — RX ONLY (RX ONLY)
Age: 85
End: 2025-08-11

## 2025-08-11 ENCOUNTER — DOCTOR'S OFFICE (OUTPATIENT)
Dept: URBAN - NONMETROPOLITAN AREA CLINIC 1 | Facility: CLINIC | Age: 85
Setting detail: OPHTHALMOLOGY
End: 2025-08-11
Payer: MEDICARE

## 2025-08-11 DIAGNOSIS — H40.61X3: ICD-10-CM

## 2025-08-11 DIAGNOSIS — H35.371: ICD-10-CM

## 2025-08-11 DIAGNOSIS — H43.812: ICD-10-CM

## 2025-08-11 DIAGNOSIS — H35.81: ICD-10-CM

## 2025-08-11 PROCEDURE — 92014 COMPRE OPH EXAM EST PT 1/>: CPT | Performed by: OPHTHALMOLOGY

## 2025-08-11 PROCEDURE — 92134 CPTRZ OPH DX IMG PST SGM RTA: CPT | Performed by: OPHTHALMOLOGY

## 2025-08-11 ASSESSMENT — LID EXAM ASSESSMENTS
OS_BLEPHARITIS: LLL LUL
OD_BLEPHARITIS: RLL RUL

## 2025-08-11 ASSESSMENT — VISUAL ACUITY
OD_BCVA: 20/25
OS_BCVA: 20/80-1

## 2025-08-11 ASSESSMENT — REFRACTION_AUTOREFRACTION
OD_SPHERE: -1.00
OS_AXIS: 080
OD_AXIS: 114
OS_SPHERE: 0.00
OS_CYLINDER: -1.50
OD_CYLINDER: -0.50

## 2025-08-11 ASSESSMENT — KERATOMETRY
OS_K2POWER_DIOPTERS: 46.00
OS_AXISANGLE_DEGREES: 120
OD_AXISANGLE_DEGREES: 011
OD_K2POWER_DIOPTERS: 46.00
OD_K1POWER_DIOPTERS: 45.75
OS_K1POWER_DIOPTERS: 45.75

## 2025-08-11 ASSESSMENT — CONFRONTATIONAL VISUAL FIELD TEST (CVF)
OD_FINDINGS: FULL
OS_FINDINGS: FULL

## 2025-08-19 ENCOUNTER — AMBUL SURGICAL CARE (OUTPATIENT)
Dept: URBAN - METROPOLITAN AREA SURGERY 29 | Facility: SURGERY | Age: 85
Setting detail: OPHTHALMOLOGY
End: 2025-08-19
Payer: MEDICARE

## 2025-08-19 DIAGNOSIS — H40.1113: ICD-10-CM

## 2025-08-19 PROCEDURE — 65855 TRABECULOPLASTY LASER SURG: CPT | Mod: RT | Performed by: CLINIC/CENTER

## 2025-08-19 PROCEDURE — 65855 TRABECULOPLASTY LASER SURG: CPT | Mod: RT | Performed by: OPHTHALMOLOGY

## 2025-08-19 PROCEDURE — G8918 PT W/O PREOP ORDER IV AB PRO: HCPCS | Performed by: OPHTHALMOLOGY

## 2025-08-19 PROCEDURE — G8907 PT DOC NO EVENTS ON DISCHARG: HCPCS | Performed by: OPHTHALMOLOGY

## 2025-08-19 PROCEDURE — G8918 PT W/O PREOP ORDER IV AB PRO: HCPCS | Mod: RT | Performed by: CLINIC/CENTER

## 2025-08-19 PROCEDURE — G8907 PT DOC NO EVENTS ON DISCHARG: HCPCS | Mod: RT | Performed by: CLINIC/CENTER
